# Patient Record
Sex: FEMALE | Race: WHITE | Employment: FULL TIME | URBAN - NONMETROPOLITAN AREA
[De-identification: names, ages, dates, MRNs, and addresses within clinical notes are randomized per-mention and may not be internally consistent; named-entity substitution may affect disease eponyms.]

---

## 2017-03-17 ENCOUNTER — APPOINTMENT (OUTPATIENT)
Dept: GENERAL RADIOLOGY | Age: 52
End: 2017-03-17
Payer: COMMERCIAL

## 2017-03-17 ENCOUNTER — HOSPITAL ENCOUNTER (EMERGENCY)
Age: 52
Discharge: HOME | End: 2017-03-17
Payer: COMMERCIAL

## 2017-03-17 VITALS
WEIGHT: 135 LBS | HEART RATE: 67 BPM | HEIGHT: 71 IN | BODY MASS INDEX: 18.9 KG/M2 | OXYGEN SATURATION: 100 % | SYSTOLIC BLOOD PRESSURE: 126 MMHG | DIASTOLIC BLOOD PRESSURE: 85 MMHG | RESPIRATION RATE: 18 BRPM | TEMPERATURE: 97.9 F

## 2017-03-17 DIAGNOSIS — J44.1 COPD WITH ACUTE EXACERBATION (HCC): Primary | ICD-10-CM

## 2017-03-17 LAB
ALBUMIN SERPL-MCNC: 4.1 G/DL (ref 3.5–5.2)
ALP BLD-CCNC: 76 U/L (ref 35–104)
ALT SERPL-CCNC: 13 U/L (ref 5–33)
ANION GAP SERPL CALCULATED.3IONS-SCNC: 12 MMOL/L (ref 7–19)
AST SERPL-CCNC: 20 U/L (ref 5–32)
BASOPHILS ABSOLUTE: 0 K/UL (ref 0–0.2)
BASOPHILS RELATIVE PERCENT: 0.5 % (ref 0–1)
BILIRUB SERPL-MCNC: 0.9 MG/DL (ref 0.2–1.2)
BUN BLDV-MCNC: 11 MG/DL (ref 6–20)
CALCIUM SERPL-MCNC: 9.4 MG/DL (ref 8.6–10)
CHLORIDE BLD-SCNC: 103 MMOL/L (ref 98–111)
CO2: 24 MMOL/L (ref 22–29)
CREAT SERPL-MCNC: 0.8 MG/DL (ref 0.5–0.9)
D DIMER: <0.27 NG/ML DDU (ref 0–0.48)
EOSINOPHILS ABSOLUTE: 0.2 K/UL (ref 0–0.6)
EOSINOPHILS RELATIVE PERCENT: 2.1 % (ref 0–5)
GFR NON-AFRICAN AMERICAN: >60
GLOBULIN: 3.2 G/DL
GLUCOSE BLD-MCNC: 85 MG/DL (ref 74–109)
HCT VFR BLD CALC: 45.6 % (ref 37–47)
HEMOGLOBIN: 15.4 G/DL (ref 12–16)
LYMPHOCYTES ABSOLUTE: 2.7 K/UL (ref 1.1–4.5)
LYMPHOCYTES RELATIVE PERCENT: 33.2 % (ref 20–40)
MCH RBC QN AUTO: 30.1 PG (ref 27–31)
MCHC RBC AUTO-ENTMCNC: 33.8 G/DL (ref 33–37)
MCV RBC AUTO: 89.2 FL (ref 81–99)
MONOCYTES ABSOLUTE: 0.4 K/UL (ref 0–0.9)
MONOCYTES RELATIVE PERCENT: 5.5 % (ref 0–10)
NEUTROPHILS ABSOLUTE: 4.7 K/UL (ref 1.5–7.5)
NEUTROPHILS RELATIVE PERCENT: 58.6 % (ref 50–65)
PDW BLD-RTO: 13.8 % (ref 11.5–14.5)
PERFORMED ON: NORMAL
PERFORMED ON: NORMAL
PLATELET # BLD: 147 K/UL (ref 130–400)
PMV BLD AUTO: 11.4 FL (ref 7.4–10.4)
POC TROPONIN I: 0 NG/ML (ref 0–0.08)
POC TROPONIN I: 0 NG/ML (ref 0–0.08)
POTASSIUM SERPL-SCNC: 4.1 MMOL/L (ref 3.5–5)
RAPID INFLUENZA  B AGN: NEGATIVE
RAPID INFLUENZA A AGN: NEGATIVE
RBC # BLD: 5.11 M/UL (ref 4.2–5.4)
SODIUM BLD-SCNC: 139 MMOL/L (ref 136–145)
TOTAL PROTEIN: 7.3 G/DL (ref 6.6–8.7)
WBC # BLD: 8.1 K/UL (ref 4.8–10.8)

## 2017-03-17 PROCEDURE — 71020 XR CHEST STANDARD TWO VW: CPT

## 2017-03-17 PROCEDURE — 36415 COLL VENOUS BLD VENIPUNCTURE: CPT

## 2017-03-17 PROCEDURE — 99283 EMERGENCY DEPT VISIT LOW MDM: CPT | Performed by: PHYSICIAN ASSISTANT

## 2017-03-17 PROCEDURE — 99284 EMERGENCY DEPT VISIT MOD MDM: CPT

## 2017-03-17 PROCEDURE — 87804 INFLUENZA ASSAY W/OPTIC: CPT

## 2017-03-17 PROCEDURE — 94640 AIRWAY INHALATION TREATMENT: CPT

## 2017-03-17 PROCEDURE — 85025 COMPLETE CBC W/AUTO DIFF WBC: CPT

## 2017-03-17 PROCEDURE — 6370000000 HC RX 637 (ALT 250 FOR IP): Performed by: PHYSICIAN ASSISTANT

## 2017-03-17 PROCEDURE — 84484 ASSAY OF TROPONIN QUANT: CPT

## 2017-03-17 PROCEDURE — 80053 COMPREHEN METABOLIC PANEL: CPT

## 2017-03-17 PROCEDURE — 93005 ELECTROCARDIOGRAM TRACING: CPT

## 2017-03-17 PROCEDURE — 85379 FIBRIN DEGRADATION QUANT: CPT

## 2017-03-17 RX ORDER — ACETAMINOPHEN 325 MG/1
650 TABLET ORAL ONCE
Status: DISCONTINUED | OUTPATIENT
Start: 2017-03-17 | End: 2017-03-17 | Stop reason: HOSPADM

## 2017-03-17 RX ORDER — IPRATROPIUM BROMIDE AND ALBUTEROL SULFATE 2.5; .5 MG/3ML; MG/3ML
1 SOLUTION RESPIRATORY (INHALATION) ONCE
Status: COMPLETED | OUTPATIENT
Start: 2017-03-17 | End: 2017-03-17

## 2017-03-17 RX ORDER — ALBUTEROL SULFATE 90 UG/1
2 AEROSOL, METERED RESPIRATORY (INHALATION) EVERY 6 HOURS PRN
Qty: 1 INHALER | Refills: 0 | Status: SHIPPED | OUTPATIENT
Start: 2017-03-17

## 2017-03-17 RX ORDER — AZITHROMYCIN 250 MG/1
TABLET, FILM COATED ORAL
Qty: 6 TABLET | Refills: 0 | Status: SHIPPED | OUTPATIENT
Start: 2017-03-17 | End: 2018-03-17 | Stop reason: ALTCHOICE

## 2017-03-17 RX ADMIN — IPRATROPIUM BROMIDE AND ALBUTEROL SULFATE 1 AMPULE: .5; 3 SOLUTION RESPIRATORY (INHALATION) at 15:18

## 2017-03-17 ASSESSMENT — PAIN SCALES - GENERAL: PAINLEVEL_OUTOF10: 9

## 2017-03-17 ASSESSMENT — PAIN DESCRIPTION - DESCRIPTORS: DESCRIPTORS: ACHING

## 2017-03-17 ASSESSMENT — PAIN DESCRIPTION - LOCATION: LOCATION: GENERALIZED

## 2017-03-17 ASSESSMENT — PAIN DESCRIPTION - PAIN TYPE: TYPE: ACUTE PAIN

## 2017-03-19 LAB
EKG P AXIS: 76 DEGREES
EKG P-R INTERVAL: 98 MS
EKG Q-T INTERVAL: 424 MS
EKG QRS DURATION: 94 MS
EKG QTC CALCULATION (BAZETT): 432 MS
EKG T AXIS: 45 DEGREES

## 2017-03-21 ASSESSMENT — ENCOUNTER SYMPTOMS
COUGH: 1
WHEEZING: 1
NAUSEA: 0
SORE THROAT: 0
ABDOMINAL PAIN: 0
CONSTIPATION: 0
DIARRHEA: 0
VOMITING: 0
CHEST TIGHTNESS: 1
SINUS PRESSURE: 0
TROUBLE SWALLOWING: 0
SHORTNESS OF BREATH: 1
BACK PAIN: 0

## 2018-02-24 ENCOUNTER — OFFICE VISIT (OUTPATIENT)
Dept: URGENT CARE | Age: 53
End: 2018-02-24
Payer: COMMERCIAL

## 2018-02-24 VITALS
OXYGEN SATURATION: 98 % | DIASTOLIC BLOOD PRESSURE: 87 MMHG | BODY MASS INDEX: 20.16 KG/M2 | SYSTOLIC BLOOD PRESSURE: 140 MMHG | WEIGHT: 144 LBS | TEMPERATURE: 98.1 F | RESPIRATION RATE: 18 BRPM | HEIGHT: 71 IN | HEART RATE: 68 BPM

## 2018-02-24 DIAGNOSIS — L24.9 IRRITANT CONTACT DERMATITIS, UNSPECIFIED TRIGGER: Primary | ICD-10-CM

## 2018-02-24 PROCEDURE — 96372 THER/PROPH/DIAG INJ SC/IM: CPT | Performed by: SPECIALIST

## 2018-02-24 PROCEDURE — 99212 OFFICE O/P EST SF 10 MIN: CPT | Performed by: SPECIALIST

## 2018-02-24 RX ORDER — METHYLPREDNISOLONE 4 MG/1
TABLET ORAL
Qty: 1 KIT | Refills: 0 | Status: SHIPPED | OUTPATIENT
Start: 2018-02-24 | End: 2018-03-02

## 2018-02-24 RX ORDER — DEXAMETHASONE SODIUM PHOSPHATE 10 MG/ML
5 INJECTION INTRAMUSCULAR; INTRAVENOUS ONCE
Status: COMPLETED | OUTPATIENT
Start: 2018-02-24 | End: 2018-02-24

## 2018-02-24 RX ADMIN — DEXAMETHASONE SODIUM PHOSPHATE 5 MG: 10 INJECTION INTRAMUSCULAR; INTRAVENOUS at 15:53

## 2018-02-24 ASSESSMENT — ENCOUNTER SYMPTOMS
SHORTNESS OF BREATH: 0
SORE THROAT: 0

## 2018-02-24 NOTE — PROGRESS NOTES
as:  ¨ Increased pain, swelling, warmth, or redness. ¨ Red streaks leading from the area. ¨ Pus draining from the area. ¨ A fever. ? · You have joint pain along with the rash. ? Watch closely for changes in your health, and be sure to contact your doctor if:  ? · Your rash is changing or getting worse. ? · You are not getting better as expected. Where can you learn more? Go to https://[email or web address].[email or web address]. org and sign in to your [employer] account. Enter [phone] in the [employer] box to learn more about \"Dermatitis: Care Instructions. \"     If you do not have an account, please click on the \"Sign Up Now\" link. Current as of: October 13, 2016  Content Version: 11.5  © 2404-8931 [employer]wise, Incorporated. Care instructions adapted under license by [facility] ([facility]). If you have questions about a medical condition or this instruction, always ask your healthcare professional. [place] [address] any warranty or liability for your use of this information.              Electronically signed by Thao Herzog NP on 2/24/2018 at 3:53 PM

## 2018-03-17 ENCOUNTER — HOSPITAL ENCOUNTER (EMERGENCY)
Age: 53
Discharge: HOME | End: 2018-03-17
Payer: COMMERCIAL

## 2018-03-17 VITALS
HEIGHT: 71 IN | OXYGEN SATURATION: 100 % | RESPIRATION RATE: 18 BRPM | HEART RATE: 83 BPM | TEMPERATURE: 98.5 F | WEIGHT: 145 LBS | BODY MASS INDEX: 20.3 KG/M2 | DIASTOLIC BLOOD PRESSURE: 84 MMHG | SYSTOLIC BLOOD PRESSURE: 133 MMHG

## 2018-03-17 DIAGNOSIS — L40.9 PSORIASIS: ICD-10-CM

## 2018-03-17 DIAGNOSIS — L03.113 CELLULITIS OF RIGHT FOREARM: ICD-10-CM

## 2018-03-17 DIAGNOSIS — L20.9 ATOPIC DERMATITIS, UNSPECIFIED TYPE: Primary | ICD-10-CM

## 2018-03-17 PROCEDURE — 96372 THER/PROPH/DIAG INJ SC/IM: CPT

## 2018-03-17 PROCEDURE — 6360000002 HC RX W HCPCS: Performed by: NURSE PRACTITIONER

## 2018-03-17 PROCEDURE — 99284 EMERGENCY DEPT VISIT MOD MDM: CPT | Performed by: NURSE PRACTITIONER

## 2018-03-17 PROCEDURE — 99282 EMERGENCY DEPT VISIT SF MDM: CPT

## 2018-03-17 RX ORDER — PREDNISONE 1 MG/1
5 TABLET ORAL DAILY
Qty: 5 TABLET | Refills: 0 | Status: SHIPPED | OUTPATIENT
Start: 2018-03-17 | End: 2018-03-17 | Stop reason: SDUPTHER

## 2018-03-17 RX ORDER — DOXYCYCLINE HYCLATE 100 MG
100 TABLET ORAL 2 TIMES DAILY
Qty: 20 TABLET | Refills: 0 | Status: SHIPPED | OUTPATIENT
Start: 2018-03-17 | End: 2018-03-27

## 2018-03-17 RX ORDER — PREDNISONE 10 MG/1
10 TABLET ORAL DAILY
Qty: 5 TABLET | Refills: 0 | Status: SHIPPED | OUTPATIENT
Start: 2018-03-17 | End: 2018-03-17 | Stop reason: SDUPTHER

## 2018-03-17 RX ORDER — PREDNISONE 10 MG/1
10 TABLET ORAL DAILY
Qty: 5 TABLET | Refills: 0 | Status: SHIPPED | OUTPATIENT
Start: 2018-03-17 | End: 2018-03-22

## 2018-03-17 RX ORDER — TRIAMCINOLONE ACETONIDE 40 MG/ML
40 INJECTION, SUSPENSION INTRA-ARTICULAR; INTRAMUSCULAR ONCE
Status: COMPLETED | OUTPATIENT
Start: 2018-03-17 | End: 2018-03-17

## 2018-03-17 RX ORDER — PREDNISONE 20 MG/1
20 TABLET ORAL DAILY
Qty: 5 TABLET | Refills: 0 | Status: SHIPPED | OUTPATIENT
Start: 2018-03-17 | End: 2018-03-22

## 2018-03-17 RX ORDER — PREDNISONE 1 MG/1
5 TABLET ORAL DAILY
Qty: 5 TABLET | Refills: 0 | Status: SHIPPED | OUTPATIENT
Start: 2018-03-17 | End: 2018-03-22

## 2018-03-17 RX ORDER — DOXYCYCLINE HYCLATE 100 MG
100 TABLET ORAL 2 TIMES DAILY
Qty: 20 TABLET | Refills: 0 | Status: SHIPPED | OUTPATIENT
Start: 2018-03-17 | End: 2018-03-17 | Stop reason: SDUPTHER

## 2018-03-17 RX ORDER — PREDNISONE 20 MG/1
20 TABLET ORAL DAILY
Qty: 5 TABLET | Refills: 0 | Status: SHIPPED | OUTPATIENT
Start: 2018-03-17 | End: 2018-03-17 | Stop reason: SDUPTHER

## 2018-03-17 RX ORDER — DEXAMETHASONE SODIUM PHOSPHATE 4 MG/ML
4 INJECTION, SOLUTION INTRA-ARTICULAR; INTRALESIONAL; INTRAMUSCULAR; INTRAVENOUS; SOFT TISSUE ONCE
Status: COMPLETED | OUTPATIENT
Start: 2018-03-17 | End: 2018-03-17

## 2018-03-17 RX ADMIN — TRIAMCINOLONE ACETONIDE 40 MG: 40 INJECTION, SUSPENSION INTRA-ARTICULAR; INTRAMUSCULAR at 12:51

## 2018-03-17 RX ADMIN — DEXAMETHASONE SODIUM PHOSPHATE 4 MG: 4 INJECTION, SOLUTION INTRAMUSCULAR; INTRAVENOUS at 12:51

## 2018-03-17 NOTE — ED PROVIDER NOTES
[facility]  eMERGENCY dEPARTMENT eNCOUnter      Pt Name: Cameron Guzman  MRN: [id number]  [place] 1965  Date of evaluation: 3/17/2018  Provider: Juan Benjamin, [address]       Chief Complaint   Patient presents with    Rash     presents with c/o rash x 2 weeks          HISTORY OF PRESENT ILLNESS   (Location/Symptom, Timing/Onset, Context/Setting, Quality, Duration, Modifying Factors, Severity)  Note limiting factors. Cameron Guzman is a 47 y.o. female who presents to the emergency department for evaluation of rash. Pt tells me that she has had rash to bilateral arms and legs over past 2 weeks. She gives history of psoriasis and eczema previously treated by Dr. Still with Humira injections however she has had difficulty affording copay for the medication. She tells me that she received steroid injection and medrol dose pack 2 weeks ago that seemed to improve the rash. She tells me that she has noticed some increased redness to right arm. She describes rash as very pruritic. She denies fever as well as constitutional symptoms of illness. HPI    Nursing Notes were reviewed. REVIEW OF SYSTEMS    (2-9 systems for level 4, 10 or more for level 5)     Review of Systems   Constitutional: Negative. Skin: Positive for rash. A complete review of systems was performed and is negative except as noted above in the HPI.        PAST MEDICAL HISTORY     Past Medical History:   Diagnosis Date    COPD (chronic obstructive pulmonary disease) ([facility] [address]          SURGICAL HISTORY       Past Surgical History:   Procedure Laterality Date    HYSTERECTOMY      OVARIAN CYST REMOVAL           CURRENT MEDICATIONS       Discharge Medication List as of 3/17/2018 12:45 PM      CONTINUE these medications which have NOT CHANGED    Details   albuterol sulfate HFA (VENTOLIN HFA) 108 (90 BASE) MCG/ACT inhaler Inhale 2 puffs into the lungs every 6 hours as needed for Wheezing, Disp-1 Inhaler, R-0Print ALLERGIES     Ciprofloxacin; Codeine; and Keflex [cephalexin]    FAMILY HISTORY       Family History   Problem Relation Age of Onset    Heart Disease Mother     Cancer Father     High Cholesterol Sister     High Blood Pressure Brother     Heart Disease Brother     Cancer Brother     High Cholesterol Brother           SOCIAL HISTORY       Social History     Social History    Marital status: Legally      Spouse name: N/A    Number of children: N/A    Years of education: N/A     Social History Main Topics    Smoking status: Current Every Day Smoker     Packs/day: 1.00     Types: Cigarettes    Smokeless tobacco: Current User    Alcohol use 0.0 oz/week      Comment: occasionally    Drug use: No    Sexual activity: Not Asked     Other Topics Concern    None     Social History Narrative    None       SCREENINGS             PHYSICAL EXAM    (up to 7 for level 4, 8 or more for level 5)     ED Triage Vitals [03/17/18 1217]   BP Temp Temp src Pulse Resp SpO2 Height Weight   133/84 98.5 °F (36.9 °C) -- 83 18 100 % 5' 11\" (1.803 m) 145 lb (65.8 kg)       Physical Exam   Constitutional: She is oriented to person, place, and time. She appears well-nourished. HENT:   Head: Normocephalic. Right Ear: External ear normal.   Left Ear: External ear normal.   Mouth/Throat: Oropharynx is clear and moist.   Eyes: Conjunctivae and EOM are normal. Pupils are equal, round, and reactive to light. Neck: Normal range of motion. Cardiovascular: Normal rate, regular rhythm, normal heart sounds and intact distal pulses. Pulmonary/Chest: Effort normal and breath sounds normal.   Abdominal: Soft. Bowel sounds are normal.   Musculoskeletal: Normal range of motion. Neurological: She is alert and oriented to person, place, and time. Skin: Skin is warm and dry.    Bilateral dorsal forearms and anterior lower extremities with excoriated appearing erythematous lesions with mild generalized erythema to dorsal

## 2018-09-04 ENCOUNTER — HOSPITAL ENCOUNTER (EMERGENCY)
Facility: HOSPITAL | Age: 53
Discharge: HOME | End: 2018-09-04
Admitting: EMERGENCY MEDICINE

## 2018-09-04 ENCOUNTER — APPOINTMENT (OUTPATIENT)
Dept: GENERAL RADIOLOGY | Facility: HOSPITAL | Age: 53
End: 2018-09-04

## 2018-09-04 ENCOUNTER — APPOINTMENT (OUTPATIENT)
Dept: CT IMAGING | Facility: HOSPITAL | Age: 53
End: 2018-09-04

## 2018-09-04 VITALS
TEMPERATURE: 97 F | OXYGEN SATURATION: 100 % | HEART RATE: 65 BPM | HEIGHT: 71 IN | WEIGHT: 145 LBS | RESPIRATION RATE: 18 BRPM | BODY MASS INDEX: 20.3 KG/M2 | DIASTOLIC BLOOD PRESSURE: 81 MMHG | SYSTOLIC BLOOD PRESSURE: 133 MMHG

## 2018-09-04 DIAGNOSIS — M54.50 ACUTE RIGHT-SIDED LOW BACK PAIN WITHOUT SCIATICA: Primary | ICD-10-CM

## 2018-09-04 LAB
ALBUMIN SERPL-MCNC: 4.3 G/DL (ref 3.5–5)
ALBUMIN/GLOB SERPL: 1.5 G/DL (ref 1.1–2.5)
ALP SERPL-CCNC: 61 U/L (ref 24–120)
ALT SERPL W P-5'-P-CCNC: 20 U/L (ref 0–54)
AMYLASE SERPL-CCNC: 83 U/L (ref 30–110)
ANION GAP SERPL CALCULATED.3IONS-SCNC: 7 MMOL/L (ref 4–13)
AST SERPL-CCNC: 21 U/L (ref 7–45)
BACTERIA UR QL AUTO: ABNORMAL /HPF
BASOPHILS # BLD AUTO: 0.05 10*3/MM3 (ref 0–0.2)
BASOPHILS NFR BLD AUTO: 0.6 % (ref 0–2)
BILIRUB SERPL-MCNC: 0.6 MG/DL (ref 0.1–1)
BILIRUB UR QL STRIP: NEGATIVE
BUN BLD-MCNC: 15 MG/DL (ref 5–21)
BUN/CREAT SERPL: 17.6 (ref 7–25)
CALCIUM SPEC-SCNC: 9.5 MG/DL (ref 8.4–10.4)
CHLORIDE SERPL-SCNC: 108 MMOL/L (ref 98–110)
CLARITY UR: ABNORMAL
CO2 SERPL-SCNC: 27 MMOL/L (ref 24–31)
COLOR UR: ABNORMAL
CREAT BLD-MCNC: 0.85 MG/DL (ref 0.5–1.4)
DEPRECATED RDW RBC AUTO: 43.4 FL (ref 40–54)
EOSINOPHIL # BLD AUTO: 0.25 10*3/MM3 (ref 0–0.7)
EOSINOPHIL NFR BLD AUTO: 3 % (ref 0–4)
ERYTHROCYTE [DISTWIDTH] IN BLOOD BY AUTOMATED COUNT: 14 % (ref 12–15)
GFR SERPL CREATININE-BSD FRML MDRD: 70 ML/MIN/1.73
GLOBULIN UR ELPH-MCNC: 2.8 GM/DL
GLUCOSE BLD-MCNC: 83 MG/DL (ref 70–100)
GLUCOSE UR STRIP-MCNC: NEGATIVE MG/DL
HCT VFR BLD AUTO: 42.6 % (ref 37–47)
HGB BLD-MCNC: 14.7 G/DL (ref 12–16)
HGB UR QL STRIP.AUTO: NEGATIVE
HYALINE CASTS UR QL AUTO: ABNORMAL /LPF
IMM GRANULOCYTES # BLD: 0.03 10*3/MM3 (ref 0–0.03)
IMM GRANULOCYTES NFR BLD: 0.4 % (ref 0–5)
KETONES UR QL STRIP: ABNORMAL
LEUKOCYTE ESTERASE UR QL STRIP.AUTO: ABNORMAL
LIPASE SERPL-CCNC: 202 U/L (ref 23–203)
LYMPHOCYTES # BLD AUTO: 3.06 10*3/MM3 (ref 0.72–4.86)
LYMPHOCYTES NFR BLD AUTO: 36.6 % (ref 15–45)
MCH RBC QN AUTO: 29.9 PG (ref 28–32)
MCHC RBC AUTO-ENTMCNC: 34.5 G/DL (ref 33–36)
MCV RBC AUTO: 86.8 FL (ref 82–98)
MONOCYTES # BLD AUTO: 0.42 10*3/MM3 (ref 0.19–1.3)
MONOCYTES NFR BLD AUTO: 5 % (ref 4–12)
NEUTROPHILS # BLD AUTO: 4.55 10*3/MM3 (ref 1.87–8.4)
NEUTROPHILS NFR BLD AUTO: 54.4 % (ref 39–78)
NITRITE UR QL STRIP: NEGATIVE
NRBC BLD MANUAL-RTO: 0 /100 WBC (ref 0–0)
PH UR STRIP.AUTO: 5.5 [PH] (ref 5–8)
PLATELET # BLD AUTO: 172 10*3/MM3 (ref 130–400)
PMV BLD AUTO: 11.9 FL (ref 6–12)
POTASSIUM BLD-SCNC: 4.2 MMOL/L (ref 3.5–5.3)
PROT SERPL-MCNC: 7.1 G/DL (ref 6.3–8.7)
PROT UR QL STRIP: NEGATIVE
RBC # BLD AUTO: 4.91 10*6/MM3 (ref 4.2–5.4)
RBC # UR: ABNORMAL /HPF
REF LAB TEST METHOD: ABNORMAL
SODIUM BLD-SCNC: 142 MMOL/L (ref 135–145)
SP GR UR STRIP: 1.03 (ref 1–1.03)
SQUAMOUS #/AREA URNS HPF: ABNORMAL /HPF
UROBILINOGEN UR QL STRIP: ABNORMAL
WBC NRBC COR # BLD: 8.36 10*3/MM3 (ref 4.8–10.8)
WBC UR QL AUTO: ABNORMAL /HPF

## 2018-09-04 PROCEDURE — 74018 RADEX ABDOMEN 1 VIEW: CPT

## 2018-09-04 PROCEDURE — 99283 EMERGENCY DEPT VISIT LOW MDM: CPT

## 2018-09-04 PROCEDURE — 96375 TX/PRO/DX INJ NEW DRUG ADDON: CPT

## 2018-09-04 PROCEDURE — 25010000002 ONDANSETRON PER 1 MG: Performed by: EMERGENCY MEDICINE

## 2018-09-04 PROCEDURE — 25010000002 IOPAMIDOL 61 % SOLUTION: Performed by: PHYSICIAN ASSISTANT

## 2018-09-04 PROCEDURE — 72110 X-RAY EXAM L-2 SPINE 4/>VWS: CPT

## 2018-09-04 PROCEDURE — 96374 THER/PROPH/DIAG INJ IV PUSH: CPT

## 2018-09-04 PROCEDURE — 25010000002 MORPHINE PER 10 MG: Performed by: EMERGENCY MEDICINE

## 2018-09-04 PROCEDURE — 85025 COMPLETE CBC W/AUTO DIFF WBC: CPT | Performed by: EMERGENCY MEDICINE

## 2018-09-04 PROCEDURE — 82150 ASSAY OF AMYLASE: CPT | Performed by: EMERGENCY MEDICINE

## 2018-09-04 PROCEDURE — 96361 HYDRATE IV INFUSION ADD-ON: CPT

## 2018-09-04 PROCEDURE — 81001 URINALYSIS AUTO W/SCOPE: CPT

## 2018-09-04 PROCEDURE — 83690 ASSAY OF LIPASE: CPT | Performed by: EMERGENCY MEDICINE

## 2018-09-04 PROCEDURE — 74177 CT ABD & PELVIS W/CONTRAST: CPT

## 2018-09-04 PROCEDURE — 80053 COMPREHEN METABOLIC PANEL: CPT | Performed by: EMERGENCY MEDICINE

## 2018-09-04 RX ORDER — ONDANSETRON 2 MG/ML
4 INJECTION INTRAMUSCULAR; INTRAVENOUS ONCE
Status: COMPLETED | OUTPATIENT
Start: 2018-09-04 | End: 2018-09-04

## 2018-09-04 RX ORDER — METHYLPREDNISOLONE 32 MG/1
32 TABLET ORAL DAILY
Qty: 5 TABLET | Refills: 0 | Status: SHIPPED | OUTPATIENT
Start: 2018-09-04 | End: 2018-09-12

## 2018-09-04 RX ORDER — HYDROCODONE BITARTRATE AND ACETAMINOPHEN 5; 325 MG/1; MG/1
1 TABLET ORAL 4 TIMES DAILY PRN
Qty: 12 TABLET | Refills: 0 | Status: SHIPPED | OUTPATIENT
Start: 2018-09-04 | End: 2018-09-12

## 2018-09-04 RX ORDER — SODIUM CHLORIDE 9 MG/ML
125 INJECTION, SOLUTION INTRAVENOUS CONTINUOUS
Status: DISCONTINUED | OUTPATIENT
Start: 2018-09-04 | End: 2018-09-04 | Stop reason: HOSPADM

## 2018-09-04 RX ORDER — MELOXICAM 7.5 MG/1
7.5 TABLET ORAL DAILY
Qty: 30 TABLET | Refills: 0 | Status: ON HOLD | OUTPATIENT
Start: 2018-09-04 | End: 2021-04-21

## 2018-09-04 RX ADMIN — IOPAMIDOL 100 ML: 612 INJECTION, SOLUTION INTRAVENOUS at 19:24

## 2018-09-04 RX ADMIN — MORPHINE SULFATE 4 MG: 4 INJECTION INTRAVENOUS at 18:33

## 2018-09-04 RX ADMIN — ONDANSETRON HYDROCHLORIDE 4 MG: 2 INJECTION, SOLUTION INTRAMUSCULAR; INTRAVENOUS at 18:31

## 2018-09-04 RX ADMIN — SODIUM CHLORIDE 1000 ML: 9 INJECTION, SOLUTION INTRAVENOUS at 18:30

## 2018-09-04 NOTE — ED PROVIDER NOTES
Subjective     Flank Pain   Associated symptoms: nausea    Associated symptoms: no diarrhea, no fatigue, no fever and no vomiting      Patient is a pleasant 53-year-old female with chief complaint of sudden onset right flank pain that began 4 days ago.  She describes this as a stabbing searing sensation that is constant.  It is worse when she is upright and bending or twisting.  It is a constant pain that feels similar to time she had a kidney stone.  Her last episode was 5 years ago.  She was able to pass it on her own without any further surgical intervention.  She has tried Advil with minimal relief.  She rates her pain a 6 out of 10 presently.  She has noted urinary frequency without hematuria.  She denies any abnormal vaginal bleeding or discharge.  She denies associated trauma.  She denies any known back issues.  She denies any associated fever but does have nausea without vomiting.  She denies any changes with her bowel habits.    Review of Systems   Constitutional: Positive for activity change. Negative for fatigue and fever.   HENT: Negative.    Eyes: Negative.    Respiratory: Negative.    Cardiovascular: Negative.    Gastrointestinal: Positive for nausea. Negative for diarrhea and vomiting.   Genitourinary: Positive for flank pain and frequency.   Musculoskeletal: Positive for back pain.   Skin: Negative.    Neurological: Negative.    Psychiatric/Behavioral: Negative.        History reviewed. No pertinent past medical history.    Allergies   Allergen Reactions   • Codeine Hives   • Keflex [Cephalexin] Hives       History reviewed. No pertinent surgical history.    No family history on file.    Social History     Social History   • Marital status:      Social History Main Topics   • Drug use: Unknown     Other Topics Concern   • Not on file       Prior to Admission medications    Not on File       Medications   sodium chloride 0.9 % infusion (not administered)   sodium chloride 0.9 % bolus 1,000 mL  "(1,000 mL Intravenous New Bag 9/4/18 1830)   morphine injection 4 mg (4 mg Intravenous Given 9/4/18 1833)   ondansetron (ZOFRAN) injection 4 mg (4 mg Intravenous Given 9/4/18 1831)   iopamidol (ISOVUE-300) 61 % injection 100 mL (100 mL Intravenous Given 9/4/18 1924)       /73   Pulse 64   Temp 99 °F (37.2 °C)   Resp 17   Ht 180.3 cm (71\")   Wt 65.8 kg (145 lb)   SpO2 99%   BMI 20.22 kg/m²       Objective   Physical Exam   Constitutional: She is oriented to person, place, and time. She appears well-developed and well-nourished.  Non-toxic appearance. No distress.   HENT:   Head: Normocephalic and atraumatic.   Right Ear: External ear normal.   Left Ear: External ear normal.   Nose: Nose normal.   Mouth/Throat: Uvula is midline, oropharynx is clear and moist and mucous membranes are normal.   Eyes: Pupils are equal, round, and reactive to light. Conjunctivae, EOM and lids are normal. Lids are everted and swept, no foreign bodies found.   Neck: Trachea normal, normal range of motion, full passive range of motion without pain and phonation normal. Neck supple. Normal carotid pulses and no JVD present. Carotid bruit is not present. No neck rigidity.   Cardiovascular: Normal rate, regular rhythm, normal heart sounds, intact distal pulses and normal pulses.    Pulmonary/Chest: Effort normal and breath sounds normal. No stridor. No apnea and no tachypnea. No respiratory distress.   Abdominal: Soft. Normal appearance, normal aorta and bowel sounds are normal. There is CVA tenderness.   Moderate right sided CVA tenderness   Musculoskeletal: Normal range of motion.        Cervical back: Normal.        Thoracic back: Normal.        Lumbar back: She exhibits bony tenderness and pain. She exhibits normal range of motion, no swelling, no laceration, no spasm and normal pulse.   Neurological: She is alert and oriented to person, place, and time. She has normal strength. No cranial nerve deficit or sensory deficit. Gait " normal. GCS eye subscore is 4. GCS verbal subscore is 5. GCS motor subscore is 6.   Skin: Skin is warm, dry and intact. Capillary refill takes less than 2 seconds. She is not diaphoretic. No pallor.   Psychiatric: She has a normal mood and affect. Her speech is normal and behavior is normal.   Nursing note and vitals reviewed.      Procedures         Lab Results (last 24 hours)     Procedure Component Value Units Date/Time    Urinalysis With Culture If Indicated - Urine, Clean Catch [[id number]]  (Abnormal) Collected:  09/04/18 1642    Specimen:  Urine from Urine, Clean Catch Updated:  09/04/18 1726     Color, UA Dark Yellow (A)     Appearance, UA Cloudy (A)     pH, UA 5.5     Specific Gravity, UA 1.027     Glucose, UA Negative     Ketones, UA Trace (A)     Bilirubin, UA Negative     Blood, UA Negative     Protein, UA Negative     Leuk Esterase, UA Small (1+) (A)     Nitrite, UA Negative     Urobilinogen, UA 1.0 E.U./dL    Urinalysis, Microscopic Only - Urine, Clean Catch [[id number]]  (Abnormal) Collected:  09/04/18 1642    Specimen:  Urine from Urine, Clean Catch Updated:  09/04/18 1726     RBC, UA 0-2 (A) /HPF      WBC, UA 3-5 (A) /HPF      Bacteria, UA Trace (A) /HPF      Squamous Epithelial Cells, UA 0-2 /HPF      Hyaline Casts, UA None Seen /LPF      Methodology Manual Light Microscopy    CBC & Differential [[id number]] Collected:  09/04/18 1828    Specimen:  Blood Updated:  09/04/18 1841    Narrative:       The following orders were created for panel order CBC & Differential.  Procedure                               Abnormality         Status                     ---------                               -----------         ------                     CBC Auto Differential[[id number]]        Normal              Final result                 Please view results for these tests on the individual orders.    Comprehensive Metabolic Panel [[id number]] Collected:  09/04/18 1828    Specimen:  Blood Updated:  09/04/18 1852      Glucose 83 mg/dL      BUN 15 mg/dL      Creatinine 0.85 mg/dL      Sodium 142 mmol/L      Potassium 4.2 mmol/L      Chloride 108 mmol/L      CO2 27.0 mmol/L      Calcium 9.5 mg/dL      Total Protein 7.1 g/dL      Albumin 4.30 g/dL      ALT (SGPT) 20 U/L      AST (SGOT) 21 U/L      Alkaline Phosphatase 61 U/L      Total Bilirubin 0.6 mg/dL      eGFR Non African Amer 70 mL/min/1.73      Globulin 2.8 gm/dL      A/G Ratio 1.5 g/dL      BUN/Creatinine Ratio 17.6     Anion Gap 7.0 mmol/L     Lipase [[id number]]  (Normal) Collected:  09/04/18 1828    Specimen:  Blood Updated:  09/04/18 1852     Lipase 202 U/L     Amylase [[id number]]  (Normal) Collected:  09/04/18 1828    Specimen:  Blood Updated:  09/04/18 1852     Amylase 83 U/L     CBC Auto Differential [[id number]]  (Normal) Collected:  09/04/18 1828    Specimen:  Blood Updated:  09/04/18 1841     WBC 8.36 10*3/mm3      RBC 4.91 10*6/mm3      Hemoglobin 14.7 g/dL      Hematocrit 42.6 %      MCV 86.8 fL      MCH 29.9 pg      MCHC 34.5 g/dL      RDW 14.0 %      RDW-SD 43.4 fl      MPV 11.9 fL      Platelets 172 10*3/mm3      Neutrophil % 54.4 %      Lymphocyte % 36.6 %      Monocyte % 5.0 %      Eosinophil % 3.0 %      Basophil % 0.6 %      Immature Grans % 0.4 %      Neutrophils, Absolute 4.55 10*3/mm3      Lymphocytes, Absolute 3.06 10*3/mm3      Monocytes, Absolute 0.42 10*3/mm3      Eosinophils, Absolute 0.25 10*3/mm3      Basophils, Absolute 0.05 10*3/mm3      Immature Grans, Absolute 0.03 10*3/mm3      nRBC 0.0 /100 WBC           Ct Abdomen Pelvis With Contrast    Result Date: 9/4/2018  Narrative:  History: 53-year-old with right flank abdominal pain.  Reference: None.  Technique Contrast-enhanced CT abdomen/pelvis was performed with coronal and sagittal reformatted images provided.  For this CT exam, one or more of the following dose reduction techniques was employed: -automated exposure control -mA and/or kVp adjustment for patient size -iterative reconstruction    mGy-cm  Findings  Chest Incidental scanning through the lower chest demonstrates clear lung bases.  Abdomen Periportal edema of the liver which is a nonspecific finding. No focal liver lesions. No biliary dilatation. Gallbladder unremarkable. Spleen unremarkable. No pancreatic or adrenal abnormality.  No renal abnormality. No urinary tract stones or obstructive uropathy. Aortoiliac atherosclerotic calcification without aneurysm. No bowel obstruction or acute inflammatory process. Normal appendix. Large amount stool throughout the colon. No lymphadenopathy, free air, or free fluid.  Pelvis Urinary bladder unremarkable. Uterus surgically absent. No pelvic free fluid or lymphadenopathy. No acute osseous findings. Degenerative changes throughout the spine.       Impression: Periportal edema of the liver, nonspecific finding. This can be seen with underlying chronic liver pathology or an effect of recent IV fluid bolus. No acute findings in the abdomen or pelvis. Constipation. This report was finalized on 09/04/2018 20:25 by Dr Christopher Marcelino, .    Xr Abdomen Kub    Result Date: 9/4/2018  Narrative: History: 53-year-old with right flank pain.  Reference: None.  FINDINGS: Frontal abdominal radiograph.  No obvious urinary tract stones. Multiple pelvic phleboliths. Nonobstructive bowel gas pattern. No free air. No acute osseous findings.      Impression: No definite urinary tract stone. This report was finalized on 09/04/2018 19:10 by Dr Christopher Marcelino, .    Xr Spine Lumbar 4+ View    Result Date: 9/4/2018  Narrative: History: 53-year-old with right flank pain. Back pain.  Reference: None.  FINDINGS: Multiple views of the lumbar spine.  Demineralization. No acute fracture or subluxation. Normal alignment. Advanced disc degeneration L4-L5 and to a lesser degree L5-S1. Lower lumbar spine facet arthropathy. Pedicles are intact. The spinous and transverse processes are intact. Sacroiliac joints are unremarkable.      Impression:  No acute osseous abnormality of the lumbar spine. This report was finalized on 09/04/2018 19:09 by Dr Christopher Marcelino, .      ED Course             Patient has been reassessed.  She reports feeling better.  I have updated her all test results and imaging findings.  I believe she is having muscloskeletal pain based on her history.  Patient believes she is having some back issues as well.  She does not want any further pain medication.  I will prescribe a short course of pain medication and anti-inflammatories.  Advised follow-up with her primary care provider which she says she will.  She will be discharged in the care of her brother in stable condition.    MDM  Number of Diagnoses or Management Options  Diagnosis management comments: DD   mesenteric lymphadenitis, diverticulitis, intussusception, small bowel obstruction, adhesions, bowel ischemia,intraabdominal abscess, spontaneous bacterial peritonitis, hernia, urinary tract infection, ureterolithiasis,acute appendicitis, abdominal aortic aneurysm, pneumonia, porphyria and diabetic ketoacidosis as a possible cause of abdominal pain in this patient. This is a partial list of diagnoses considered.        Final diagnoses:   Acute right-sided low back pain without sciatica          Jc Campos PA  09/04/18 [id number]

## 2018-09-12 ENCOUNTER — OFFICE VISIT (OUTPATIENT)
Dept: INTERNAL MEDICINE | Facility: CLINIC | Age: 53
End: 2018-09-12

## 2018-09-12 VITALS
HEIGHT: 71 IN | DIASTOLIC BLOOD PRESSURE: 85 MMHG | RESPIRATION RATE: 12 BRPM | SYSTOLIC BLOOD PRESSURE: 137 MMHG | WEIGHT: 137.19 LBS | OXYGEN SATURATION: 99 % | TEMPERATURE: 98.4 F | HEART RATE: 62 BPM | BODY MASS INDEX: 19.21 KG/M2

## 2018-09-12 DIAGNOSIS — M51.36 DDD (DEGENERATIVE DISC DISEASE), LUMBAR: ICD-10-CM

## 2018-09-12 DIAGNOSIS — Z12.11 COLON CANCER SCREENING: ICD-10-CM

## 2018-09-12 DIAGNOSIS — Z76.89 ESTABLISHING CARE WITH NEW DOCTOR, ENCOUNTER FOR: ICD-10-CM

## 2018-09-12 DIAGNOSIS — K59.09 OTHER CONSTIPATION: ICD-10-CM

## 2018-09-12 DIAGNOSIS — Z12.31 ENCOUNTER FOR SCREENING MAMMOGRAM FOR BREAST CANCER: ICD-10-CM

## 2018-09-12 DIAGNOSIS — Z00.00 ADULT GENERAL MEDICAL EXAM: Primary | ICD-10-CM

## 2018-09-12 DIAGNOSIS — Z23 NEED FOR PNEUMOCOCCAL VACCINE: ICD-10-CM

## 2018-09-12 DIAGNOSIS — F17.210 CIGARETTE SMOKER: ICD-10-CM

## 2018-09-12 PROBLEM — J41.0 SIMPLE CHRONIC BRONCHITIS: Status: ACTIVE | Noted: 2018-09-12

## 2018-09-12 PROCEDURE — 99204 OFFICE O/P NEW MOD 45 MIN: CPT | Performed by: NURSE PRACTITIONER

## 2018-09-12 PROCEDURE — 90732 PPSV23 VACC 2 YRS+ SUBQ/IM: CPT | Performed by: NURSE PRACTITIONER

## 2018-09-12 PROCEDURE — 90471 IMMUNIZATION ADMIN: CPT | Performed by: NURSE PRACTITIONER

## 2018-09-12 RX ORDER — ALBUTEROL SULFATE 90 UG/1
2 AEROSOL, METERED RESPIRATORY (INHALATION) DAILY PRN
COMMUNITY
Start: 2017-03-17

## 2018-09-12 RX ORDER — TIZANIDINE 4 MG/1
4 TABLET ORAL NIGHTLY PRN
Qty: 30 TABLET | Refills: 3 | Status: ON HOLD | OUTPATIENT
Start: 2018-09-12 | End: 2021-04-21

## 2018-09-12 NOTE — PROGRESS NOTES
CC: establish care, low back pain    History:  Sol Fontenot is a 53 y.o. female who presents today for evaluation of the above problems.    Patient present today to establish care and for follow up on low back pain. She states she has been having this pain for about a while and it is progressively worsening. She was in the ER on 09/04/2018 for acute right flank pain but she states this back pain is different. She states her xray of her back did show DDD at L4-L5. She denies any sciatic at this time. Her flank pain has improved some. She does have constipation. She has not had a BM in 2 weeks.   She denies any nausea or vomiting. No rectal bleeding.       ROS:  Review of Systems   Constitutional: Negative for fatigue and unexpected weight change.   HENT: Negative.    Eyes: Negative.    Respiratory: Negative.    Cardiovascular: Negative.    Gastrointestinal: Positive for constipation. Negative for abdominal pain and diarrhea.   Endocrine: Negative.    Genitourinary: Negative for difficulty urinating, dyspareunia, genital sores, menstrual problem, pelvic pain, vaginal bleeding, vaginal discharge and vaginal pain.   Musculoskeletal: Positive for back pain.   Skin: Negative.    Neurological: Negative.    Psychiatric/Behavioral: Negative.        Allergies   Allergen Reactions   • Codeine Hives   • Keflex [Cephalexin] Hives     Past Medical History:   Diagnosis Date   • Arthritis    • Asthma    • Kidney stone      Past Surgical History:   Procedure Laterality Date   • HYSTERECTOMY       Family History   Problem Relation Age of Onset   • Heart disease Mother    • Osteoporosis Mother    • Alcohol abuse Father    • Cancer Father    • Osteoporosis Father    • Osteoporosis Sister    • Cancer Brother    • Heart disease Brother    • Osteoporosis Brother       reports that she has been smoking Cigarettes.  She has a 30.00 pack-year smoking history. She has never used smokeless tobacco. She reports that she does not drink alcohol  "or use drugs.      Current Outpatient Prescriptions:   •  albuterol (PROVENTIL HFA;VENTOLIN HFA) 108 (90 Base) MCG/ACT inhaler, Inhale 2 puffs Daily As Needed., Disp: , Rfl:   •  meloxicam (MOBIC) 7.5 MG tablet, Take 1 tablet by mouth Daily., Disp: 30 tablet, Rfl: 0  •  tiZANidine (ZANAFLEX) 4 MG tablet, Take 1 tablet by mouth At Night As Needed for Muscle Spasms., Disp: 30 tablet, Rfl: 3    OBJECTIVE:  /85 (BP Location: Left arm, Patient Position: Sitting, Cuff Size: Adult)   Pulse 62   Temp 98.4 °F (36.9 °C) (Oral)   Resp 12   Ht 180.3 cm (71\")   Wt 62.2 kg (137 lb 3 oz)   SpO2 99%   BMI 19.13 kg/m²    Physical Exam   Constitutional: She is oriented to person, place, and time. She appears well-developed and well-nourished.   HENT:   Head: Normocephalic and atraumatic.   Eyes: Pupils are equal, round, and reactive to light. EOM are normal.   Neck: Normal range of motion. Neck supple.   Cardiovascular: Normal rate, regular rhythm and normal heart sounds.    Pulmonary/Chest: Effort normal and breath sounds normal.   Abdominal: Soft. Bowel sounds are normal.   Musculoskeletal: Normal range of motion.        Lumbar back: She exhibits bony tenderness and pain.        Back:    Tenderness on right aspect of spine and over her right gluteus sanchez   Neurological: She is alert and oriented to person, place, and time.   Skin: Skin is warm and dry.   Psychiatric: She has a normal mood and affect.   Vitals reviewed.      Assessment/Plan    Sol was seen today for establish care and back pain.    Diagnoses and all orders for this visit:    Establishing care with new doctor, encounter for  Adult general medical exam  -     Lipid panel; Future  Immunizations:      - Tetanus: Unknown or >10 years ago. Recommend to have at pharmacy or on injury.      - Influenza: recommended annually      - Pneumovax:once after age 65      - Prevnar: Once after age 65      - Zostavax: Once after age 60  Colonoscopy: Due at " 50  Mammogram: Due at 40.  PAP: at age 21  DEXA: DEXA scan at 65    Cigarette smoker  I advised Sol of the risks of continuing to use tobacco, and I provided her with tobacco cessation educational materials in the After Visit Summary.   During this visit, I spent 3 minutes counseling the patient regarding tobacco cessation.    Encounter for screening mammogram for breast cancer  -     Mammo screening bilateral w CAD; Future    DDD (degenerative disc disease), lumbar  -     Ambulatory Referral to Physical Therapy Evaluate and treat; Stretching, ROM, Strengthening  -     tiZANidine (ZANAFLEX) 4 MG tablet; Take 1 tablet by mouth At Night As Needed for Muscle Spasms.    Other constipation  Patient recommended to increase fluids and start miralax OTC and colace.     Need for pneumococcal vaccine  -     Pneumococcal polysaccharide vaccine 23-valent >= 3yo subcutaneous/IM (PPSV23)    Colon cancer screening  -     Ambulatory Referral to Gastroenterology        An After Visit Summary was printed and given to the patient at discharge.  No Follow-up on file.         Radha Zamudio APRN 9/12/2018

## 2018-09-12 NOTE — PATIENT INSTRUCTIONS
Steps to Quit Smoking  Smoking tobacco can be bad for your health. It can also affect almost every organ in your body. Smoking puts you and people around you at risk for many serious long-lasting (chronic) diseases. Quitting smoking is hard, but it is one of the best things that you can do for your health. It is never too late to quit.  What are the benefits of quitting smoking?  When you quit smoking, you lower your risk for getting serious diseases and conditions. They can include:  · Lung cancer or lung disease.  · Heart disease.  · Stroke.  · Heart attack.  · Not being able to have children (infertility).  · Weak bones (osteoporosis) and broken bones (fractures).    If you have coughing, wheezing, and shortness of breath, those symptoms may get better when you quit. You may also get sick less often. If you are pregnant, quitting smoking can help to lower your chances of having a baby of low birth weight.  What can I do to help me quit smoking?  Talk with your doctor about what can help you quit smoking. Some things you can do (strategies) include:  · Quitting smoking totally, instead of slowly cutting back how much you smoke over a period of time.  · Going to in-person counseling. You are more likely to quit if you go to many counseling sessions.  · Using resources and support systems, such as:  ? Online chats with a counselor.  ? Phone quitlines.  ? Printed self-help materials.  ? Support groups or group counseling.  ? Text messaging programs.  ? Mobile phone apps or applications.  · Taking medicines. Some of these medicines may have nicotine in them. If you are pregnant or breastfeeding, do not take any medicines to quit smoking unless your doctor says it is okay. Talk with your doctor about counseling or other things that can help you.    Talk with your doctor about using more than one strategy at the same time, such as taking medicines while you are also going to in-person counseling. This can help make  quitting easier.  What things can I do to make it easier to quit?  Quitting smoking might feel very hard at first, but there is a lot that you can do to make it easier. Take these steps:  · Talk to your family and friends. Ask them to support and encourage you.  · Call phone quitlines, reach out to support groups, or work with a counselor.  · Ask people who smoke to not smoke around you.  · Avoid places that make you want (trigger) to smoke, such as:  ? Bars.  ? Parties.  ? Smoke-break areas at work.  · Spend time with people who do not smoke.  · Lower the stress in your life. Stress can make you want to smoke. Try these things to help your stress:  ? Getting regular exercise.  ? Deep-breathing exercises.  ? Yoga.  ? Meditating.  ? Doing a body scan. To do this, close your eyes, focus on one area of your body at a time from head to toe, and notice which parts of your body are tense. Try to relax the muscles in those areas.  · Download or buy apps on your mobile phone or tablet that can help you stick to your quit plan. There are many free apps, such as QuitGuide from the CDC (Centers for Disease Control and Prevention). You can find more support from smokefree.gov and other websites.    This information is not intended to replace advice given to you by your health care provider. Make sure you discuss any questions you have with your health care provider.  Document Released: 10/14/2010 Document Revised: 08/15/2017 Document Reviewed: 05/03/2016  [employer] Patient Education © 2018 [employer]    Back Pain, Adult  Back pain is very common. The pain often gets better over time. The cause of back pain is usually not dangerous. Most people can learn to manage their back pain on their own.  Follow these instructions at home:  Watch your back pain for any changes. The following actions may help to lessen any pain you are feeling:  · Stay active. Start with short walks on flat ground if you can. Try to walk  farther each day.  · Exercise regularly as told by your doctor. Exercise helps your back heal faster. It also helps avoid future injury by keeping your muscles strong and flexible.  · Do not sit, drive, or  one place for more than 30 minutes.  · Do not stay in bed. Resting more than 1-2 days can slow down your recovery.  · Be careful when you bend or lift an object. Use good form when lifting:  ? Bend at your knees.  ? Keep the object close to your body.  ? Do not twist.  · Sleep on a firm mattress. Lie on your side, and bend your knees. If you lie on your back, put a pillow under your knees.  · Take medicines only as told by your doctor.  · Put ice on the injured area.  ? Put ice in a plastic bag.  ? Place a towel between your skin and the bag.  ? Leave the ice on for 20 minutes, 2-3 times a day for the first 2-3 days. After that, you can switch between ice and heat packs.  · Avoid feeling anxious or stressed. Find good ways to deal with stress, such as exercise.  · Maintain a healthy weight. Extra weight puts stress on your back.    Contact a doctor if:  · You have pain that does not go away with rest or medicine.  · You have worsening pain that goes down into your legs or buttocks.  · You have pain that does not get better in one week.  · You have pain at night.  · You lose weight.  · You have a fever or chills.  Get help right away if:  · You cannot control when you poop (bowel movement) or pee (urinate).  · Your arms or legs feel weak.  · Your arms or legs lose feeling (numbness).  · You feel sick to your stomach (nauseous) or throw up (vomit).  · You have belly (abdominal) pain.  · You feel like you may pass out (faint).  This information is not intended to replace advice given to you by your health care provider. Make sure you discuss any questions you have with your health care provider.  Document Released: 06/05/2009 Document Revised: 05/25/2017 Document Reviewed: 04/21/2015  [employer]  Patient Education © 2018 [employer]

## 2018-09-17 ENCOUNTER — LAB (OUTPATIENT)
Dept: LAB | Facility: HOSPITAL | Age: 53
End: 2018-09-17

## 2018-09-17 DIAGNOSIS — Z00.00 ADULT GENERAL MEDICAL EXAM: ICD-10-CM

## 2018-09-17 LAB
ARTICHOKE IGE QN: 104 MG/DL (ref 0–99)
CHOLEST SERPL-MCNC: 166 MG/DL (ref 130–200)
HDLC SERPL-MCNC: 40 MG/DL
LDLC/HDLC SERPL: 2.75 {RATIO}
TRIGL SERPL-MCNC: 81 MG/DL (ref 0–149)

## 2018-09-17 PROCEDURE — 80061 LIPID PANEL: CPT | Performed by: NURSE PRACTITIONER

## 2018-09-17 PROCEDURE — 36415 COLL VENOUS BLD VENIPUNCTURE: CPT

## 2018-09-21 ENCOUNTER — HOSPITAL ENCOUNTER (OUTPATIENT)
Dept: PHYSICAL THERAPY | Facility: HOSPITAL | Age: 53
Setting detail: THERAPIES SERIES
Discharge: HOME | End: 2018-09-21

## 2018-09-21 DIAGNOSIS — M51.36 DDD (DEGENERATIVE DISC DISEASE), LUMBAR: Primary | ICD-10-CM

## 2018-09-21 PROCEDURE — 97161 PT EVAL LOW COMPLEX 20 MIN: CPT

## 2018-09-21 NOTE — THERAPY EVALUATION
Outpatient Physical Therapy Ortho Initial Evaluation  [facility]     Patient Name: Sol Fontenot  : 1965  MRN: [id number]  Today's Date: 2018      Visit Date: 2018    Patient Active Problem List   Diagnosis   • Cigarette smoker   • Simple chronic bronchitis (CMS/HCC)        Past Medical History:   Diagnosis Date   • Arthritis    • Asthma    • Kidney stone         Past Surgical History:   Procedure Laterality Date   • HYSTERECTOMY         Visit Dx:     ICD-10-CM ICD-9-CM   1. DDD (degenerative disc disease), lumbar M51.36 722.52             Patient History     Row Name 18 1000             History    Chief Complaint Pain;Muscle weakness;Numbness;Burn  -RA      Type of Pain Back pain  -RA      Date Current Problem(s) Began 18  -RA      Brief Description of Current Complaint patient began feeling pain that she thought was a kidney stone 2 weeks ago. She had her kidneys checked and they were fine. Imaging revealed DDD in L4.  -RA      Previous treatment for THIS PROBLEM Medication  -RA      Patient/Caregiver Goals Relieve pain  -RA      Current Tobacco Use 1 pack/day  -RA      Patient's Rating of General Health Good  -RA      Hand Dominance right-handed  -RA      Occupation/sports/leisure activities Housekeeping  -RA      How has patient tried to help current problem? Meds  -RA      What clinical tests have you had for this problem? X-ray;CT scan  -RA      Results of Clinical Tests DDD L4  -RA      Are you or can you be pregnant No  -RA         Pain     Pain Location Back  -RA      Pain at Present 7  -RA      Pain at Best 3  -RA      Pain at Worst 10  -RA      Pain Frequency Constant/continuous  -RA      Pain Description Burning;Sharp  -RA      What Performance Factors Make the Current Problem(s) WORSE? Activity, sitting   -RA      What Performance Factors Make the Current Problem(s) BETTER? Laying down, walking  -RA      Tolerance Time- Standing Indefinitely  -RA      Tolerance Time-  Sitting 30 minutes  -RA      Is your sleep disturbed? Yes  -RA      Is medication used to assist with sleep? Yes  -RA      Total hours of sleep per night 6  -RA      What position do you sleep in? Left sidelying  -RA      Difficulties at work? Yes  -RA      Difficulties with ADL's? Yes  -RA         Fall Risk Assessment    Any falls in the past year: No  -RA         Daily Activities    Primary Language English  -RA      Are you able to read Yes  -RA      Are you able to write Yes  -RA      How does patient learn best? Listening  -RA      Teaching needs identified Home Exercise Program;Management of Condition  -RA      Patient is concerned about/has problems with Sitting;Reaching over head;Performing home management (household chores, shopping, care of dependents)  -RA      Pt Participated in POC and Goals Yes  -RA         Safety    Are you being hurt, hit, or frightened by anyone at home or in your life? No  -RA      Are you being neglected by a caregiver No  -RA        User Key  (r) = Recorded By, (t) = Taken By, (c) = Cosigned By    Initials Name Provider Type    Renée Hearn, PT Physical Therapist                PT Ortho     Row Name 09/21/18 1100       Posture/Observations    Posture- WNL Posture is WNL  -RA    Posture/Observations Comments PSIS on R deep, ASIS on R forward/lower  -RA       Quarter Clearing    Quarter Clearing Lower Quarter Clearing  -RA       DTR- Lower Quarter Clearing    Patellar tendon (L2-4) Bilateral:;2- Normal response  -RA    Achilles tendon (S1-2) Bilateral:;1- Minimal response  -RA       Neural Tension Signs- Lower Quarter Clearing    Slump Left:;Positive  -RA       Sensory Screen for Light Touch- Lower Quarter Clearing    L1 (inguinal area) Bilateral:;Intact  -RA    L2 (anterior mid thigh) Bilateral:;Intact  -RA    L3 (distal anterior thigh) Bilateral:;Intact  -RA    L4 (medial lower leg/foot) Bilateral:;Intact  -RA    L5 (lateral lower leg/great toe) Bilateral:;Intact  -RA     S1 (bottom of foot) Bilateral:;Intact  -RA       Myotomal Screen- Lower Quarter Clearing    Hip flexion (L2) Left:;4- (Good -);Right:;4+ (Good +)   Painful/pressure on L  -RA    Knee extension (L3) Bilateral:;4 (Good)  -RA    Ankle DF (L4) Bilateral:;4 (Good)  -RA    Great toe extension (L5) Bilateral:;4 (Good)  -RA    Ankle PF (S1) Bilateral:;4 (Good)  -RA    Knee flexion (S2) Bilateral:;4 (Good)  -RA       Lumbar ROM Screen- Lower Quarter Clearing    Lumbar Flexion Impaired  -RA    Lumbar Extension Impaired  -RA       SI/Hip Screen- Lower Quarter Clearing    Posterior thigh sheer Left:;Positive  -RA       Special Tests/Palpation    Special Tests/Palpation Lumbar/SI  -RA       Lumbosacral Palpation    Lumbosacral Palpation? Yes  -RA    SI Right:;Tender;Guarded/taut  -RA    Piriformis Right:;Tender;Guarded/taut  -RA       General ROM    GENERAL ROM COMMENTS R hip IR and ER limited  -RA       MMT (Manual Muscle Testing)    General MMT Comments Unable to forally test hip strength due to pain  -RA       Pathomechanics    Spine Pathomechanics Bends knees with attempted lumbar extension;Hinges into extension at one segment in lumbar;Excessive thoracic kyphosis with forward bend  -RA      User Key  (r) = Recorded By, (t) = Taken By, (c) = Cosigned By    Initials Name Provider Type    Renée Hearn, PT Physical Therapist                      Therapy Education  Education Details: thoracic rotation  Given: HEP  Program: New  How Provided: Verbal, Demonstration  Provided to: Patient  Level of Understanding: Verbalized, Demonstrated           PT OP Goals     Row Name 09/21/18 1200          PT Short Term Goals    STG Date to Achieve 10/05/18  -RA     STG 1 Pt will reportr decrease in symptoms of pain and pressure  -RA     STG 1 Progress New  -RA     STG 2 Pt will demonstrate proper spine pathomechanics full lumbar flexion and extension AROM  -RA     STG 2 Progress New  -RA        Long Term Goals    LTG Date to Achieve  10/21/18  -RA     LTG 1 Patient will demonstrate proper bending and lifting mechanics for work related activities,   -RA     LTG 1 Progress New  -RA     LTG 2 Patient will report no pain except for minor twinges with work activities.   -RA     LTG 2 Progress New  -RA     LTG 3 Patient will demonstrate symmetrical alignment of ASIS and PSIS  -RA     LTG 3 Progress New  -RA     LTG 4 Patient will demonstrate idpendence with HEP  -RA     LTG 4 Progress New  -RA        Time Calculation    PT Goal Re-Cert Due Date 10/21/18  -RA       User Key  (r) = Recorded By, (t) = Taken By, (c) = Cosigned By    Initials Name Provider Type    Renée Hearn, PT Physical Therapist                PT Assessment/Plan     Row Name 09/21/18 1100          PT Assessment    Functional Limitations Decreased safety during functional activities;Impaired gait;Limitation in home management;Limitations in community activities;Performance in work activities  -RA     Impairments Gait;Impaired flexibility;Impaired muscle power;Impaired muscle length;Impaired postural alignment;Posture;Poor body mechanics;Pain;Range of motion  -RA     Assessment Comments Mrs. Fontenot presesnts to PT with low back pain R>L that has hindered her ability to work as a . She struggles with lumbar flexion, lumbar extension, and ambulating. ost activity causes sharp pain. She has point tenderness to the R PSIS, which also happens to be deeper than her L. Her R ASIS is lower than her L. She is unbale to reach end range L hip flexion or extension for ssessment of her hamstring and hip flexor length. All special tests provoked her symptoms. Long axis distraction of RLE provided some relief. SHe also reports bowel and bladder retention until recently. These ymptoms have begun to resolve. She will benefit from skilled therapy servvices to reduce symptoms and imptove functional activity.   -RA     Please refer to paper survey for additional self-reported information  Yes  -RA     Rehab Potential Fair  -RA     Patient/caregiver participated in establishment of treatment plan and goals Yes  -RA     Patient would benefit from skilled therapy intervention Yes  -RA        PT Plan    PT Frequency 2x/week  -RA     Predicted Duration of Therapy Intervention (Therapy Eval) 6 weeks  -RA     Planned CPT's? PT EVAL LOW COMPLEXITY: 80118;PT THER PROC EA 15 MIN: 96492;PT MANUAL THERAPY EA 15 MIN: 34368;PT NEUROMUSC RE-EDUCATION EA 15 MIN: 29254;PT GAIT TRAINING EA 15 MIN: 15766;PT HOT OR COLD PACK TREAT MCARE;PT ELECTRICAL STIM UNATTEND: ;PT ELECTRICAL STIM ATTD EA 15 MIN: 25937;PT ULTRASOUND EA 15 MIN: 21254  -RA     Physical Therapy Interventions (Optional Details) balance training;gait training;home exercise program;joint mobilization;lumbar stabilization;manual therapy techniques;modalities;motor coordination training;patient/family education;neuromuscular re-education;postural re-education;ROM (Range of Motion);stair training;strengthening;stretching;swiss ball techniques;taping  -RA     PT Plan Comments Reduce symptoms through use of modlities. address areas of reestriction with manual therapy and stretches. Strengthen postural stabilizers with therapeutic exercise.    -RA       User Key  (r) = Recorded By, (t) = Taken By, (c) = Cosigned By    Initials Name Provider Type    Renée Hearn, PT Physical Therapist                                        Time Calculation:     Therapy Suggested Charges     Code   Minutes Charges    None             Start Time: 1100  Stop Time: 1200  Time Calculation (min): 60 min     Therapy Charges for Today     Code Description Service Date Service Provider Modifiers Qty    83932965898 HC PT EVAL LOW COMPLEXITY 4 9/21/2018 Renée Tyson, PT GP 1                    Renée Tyson PT  9/21/2018

## 2018-09-24 ENCOUNTER — HOSPITAL ENCOUNTER (OUTPATIENT)
Dept: MAMMOGRAPHY | Facility: HOSPITAL | Age: 53
Discharge: HOME | End: 2018-09-24
Admitting: NURSE PRACTITIONER

## 2018-09-24 DIAGNOSIS — Z12.31 ENCOUNTER FOR SCREENING MAMMOGRAM FOR BREAST CANCER: ICD-10-CM

## 2018-09-24 PROCEDURE — 77063 BREAST TOMOSYNTHESIS BI: CPT

## 2018-09-24 PROCEDURE — 77067 SCR MAMMO BI INCL CAD: CPT

## 2018-10-01 RX ORDER — HYDROCODONE BITARTRATE AND ACETAMINOPHEN 5; 325 MG/1; MG/1
1 TABLET ORAL EVERY 6 HOURS PRN
Status: ON HOLD | COMMUNITY
End: 2021-04-21

## 2018-10-05 ENCOUNTER — APPOINTMENT (OUTPATIENT)
Dept: PHYSICAL THERAPY | Facility: HOSPITAL | Age: 53
End: 2018-10-05

## 2018-10-11 ENCOUNTER — HOSPITAL ENCOUNTER (OUTPATIENT)
Dept: PHYSICAL THERAPY | Facility: HOSPITAL | Age: 53
Setting detail: THERAPIES SERIES
Discharge: HOME | End: 2018-10-11

## 2018-10-11 DIAGNOSIS — M51.36 DDD (DEGENERATIVE DISC DISEASE), LUMBAR: Primary | ICD-10-CM

## 2018-10-11 PROCEDURE — 97140 MANUAL THERAPY 1/> REGIONS: CPT | Performed by: PHYSICAL THERAPIST

## 2018-10-11 PROCEDURE — 97110 THERAPEUTIC EXERCISES: CPT | Performed by: PHYSICAL THERAPIST

## 2018-10-11 NOTE — THERAPY TREATMENT NOTE
Outpatient Physical Therapy Ortho Treatment Note  [facility]     Patient Name: Sol Fontenot  : 1965  MRN: [id number]  Today's Date: 10/11/2018      Visit Date: 10/11/2018    Visit Dx:    ICD-10-CM ICD-9-CM   1. DDD (degenerative disc disease), lumbar M51.36 722.52       Patient Active Problem List   Diagnosis   • Cigarette smoker   • Simple chronic bronchitis (CMS/HCC)        Past Medical History:   Diagnosis Date   • Arthritis    • Asthma    • Kidney stone         Past Surgical History:   Procedure Laterality Date   • HYSTERECTOMY      Complete   • OOPHORECTOMY                               PT Assessment/Plan     Row Name 10/11/18 1352          PT Assessment    Assessment Comments Pt with overall decreased hip mobility in RLE as compared to LLE. Pt with significant tenderness and hypertonicity in R piriformis. Pt with decreased tenderness with STM. Pt responded well to hip stretching and LB mobility exercises. Pt will continue to make improvements in therapy.  -SD        PT Plan    PT Plan Comments Work on hip flexibility and strengthening. Initiate core strengthening and lifting mechanics.   -SD       User Key  (r) = Recorded By, (t) = Taken By, (c) = Cosigned By    Initials Name Provider Type    Clarisa Granados, PT Physical Therapist                    Exercises     Row Name 10/11/18 0385             Subjective Comments    Subjective Comments Pt seems to be getting better.   -SD         Subjective Pain    Able to rate subjective pain? yes  -SD      Pre-Treatment Pain Level 4  -SD         Total Minutes    93145 - PT Therapeutic Exercise Minutes 30  -SD      64684 - PT Manual Therapy Minutes 10  -SD         Exercise 1    Exercise Name 1 piriformis stretch  -SD      Cueing 1 Verbal;Tactile  -SD      Reps 1 3  -SD      Time 1 30 sec  -SD      Additional Comments added to HEP  -SD         Exercise 2    Exercise Name 2 LTR  -SD      Cueing 2 Verbal;Tactile  -SD      Reps 2 2  -SD      Time 2 10  -SD       Additional Comments added to HEP  -SD         Exercise 3    Exercise Name 3 posterior pelvic tilt  -SD      Cueing 3 Verbal;Tactile;Demo  -SD      Sets 3 2  -SD      Reps 3 10  -SD      Time 3 5 sec  -SD      Additional Comments added to HEP  -SD        User Key  (r) = Recorded By, (t) = Taken By, (c) = Cosigned By    Initials Name Provider Type    Clarisa Granados, PT Physical Therapist                        Manual Rx (last 36 hours)      Manual Treatments     Row Name 10/11/18 1356             Total Minutes    60402 - PT Manual Therapy Minutes 10  -SD         Manual Rx 1    Manual Rx 1 Location hip stretching  -SD      Manual Rx 1 Type flexion, hamstrings, IR, ER  -SD      Manual Rx 1 Grade mod OP  -SD      Manual Rx 1 Duration 5  -SD         Manual Rx 2    Manual Rx 2 Location piriformis  -SD      Manual Rx 2 Type STM  -SD      Manual Rx 2 Grade mod OP  -SD      Manual Rx 2 Duration 5  -SD        User Key  (r) = Recorded By, (t) = Taken By, (c) = Cosigned By    Initials Name Provider Type    Clarisa Granados, PT Physical Therapist                PT OP Goals     Row Name 10/11/18 1356          PT Short Term Goals    STG Date to Achieve 10/05/18  -SD     STG 1 Pt will reportr decrease in symptoms of pain and pressure  -SD     STG 1 Progress Ongoing  -SD     STG 1 Progress Comments pt reported decrease in symptoms since evaluation  -SD     STG 2 Pt will demonstrate proper spine pathomechanics full lumbar flexion and extension AROM  -SD     STG 2 Progress Ongoing  -SD        Long Term Goals    LTG Date to Achieve 10/21/18  -SD     LTG 1 Patient will demonstrate proper bending and lifting mechanics for work related activities,   -SD     LTG 1 Progress Ongoing  -SD     LTG 2 Patient will report no pain except for minor twinges with work activities.   -SD     LTG 2 Progress Ongoing  -SD     LTG 3 Patient will demonstrate symmetrical alignment of ASIS and PSIS  -SD     LTG 3 Progress Ongoing  -SD     LTG 4 Patient  will demonstrate idpendence with HEP  -SD     LTG 4 Progress Ongoing  -SD        Time Calculation    PT Goal Re-Cert Due Date 10/21/18  -SD       User Key  (r) = Recorded By, (t) = Taken By, (c) = Cosigned By    Initials Name Provider Type    Clarisa Granados PT Physical Therapist          Therapy Education  Education Details: new exercises  Given: HEP, Symptoms/condition management, Posture/body mechanics  Program: Reinforced  How Provided: Verbal, Demonstration, Written  Provided to: Patient  Level of Understanding: Verbalized, Demonstrated              Time Calculation:   Start Time: 1356  Stop Time: 1436  Time Calculation (min): 40 min  Total Timed Code Minutes- PT: 40 minute(s)  Therapy Suggested Charges     Code   Minutes Charges    30628 (CPT®) Hc Pt Neuromusc Re Education Ea 15 Min      03242 (CPT®) Hc Pt Ther Proc Ea 15 Min 30 2    34257 (CPT®) Hc Gait Training Ea 15 Min      14915 (CPT®) Hc Pt Therapeutic Act Ea 15 Min      11252 (CPT®) Hc Pt Manual Therapy Ea 15 Min 10 1    71597 (CPT®) Hc Pt Ther Massage- Per 15 Min      47149 (CPT®) Hc Pt Iontophoresis Ea 15 Min      31433 (CPT®) Hc Pt Elec Stim Ea-Per 15 Min      90314 (CPT®) Hc Pt Ultrasound Ea 15 Min      18234 (CPT®) Hc Pt Self Care/Mgmt/Train Ea 15 Min      35191 (CPT®) Hc Pt Prosthetic (S) Train Initial Encounter, Each 15 Min      41625 (CPT®) Hc Orthotic(S) Mgmt/Train Initial Encounter, Each 15min      96096 (CPT®) Hc Pt Aquatic Therapy Ea 15 Min      12620 (CPT®) Hc Pt Orthotic(S)/Prosthetic(S) Encounter, Each 15 Min       (CPT®) Hc Pt Electrical Stim Unattended      Total  40 3        Therapy Charges for Today     Code Description Service Date Service Provider Modifiers Qty    69894536283 HC PT THER PROC EA 15 MIN 10/11/2018 Clarisa Motta, PT GP 2    04940267760 HC PT MANUAL THERAPY EA 15 MIN 10/11/2018 Clarisa Motta, PT GP 1                    Clarisa Motta PT  10/11/2018

## 2018-10-16 ENCOUNTER — APPOINTMENT (OUTPATIENT)
Dept: PHYSICAL THERAPY | Facility: HOSPITAL | Age: 53
End: 2018-10-16

## 2018-10-26 ENCOUNTER — DOCUMENTATION (OUTPATIENT)
Dept: PHYSICAL THERAPY | Facility: HOSPITAL | Age: 53
End: 2018-10-26

## 2018-10-26 DIAGNOSIS — M51.36 DDD (DEGENERATIVE DISC DISEASE), LUMBAR: Primary | ICD-10-CM

## 2018-10-26 NOTE — THERAPY DISCHARGE NOTE
Outpatient Physical Therapy Discharge Summary         Patient Name: Sol Fontenot  : 1965  MRN: [id number]    Today's Date: 10/26/2018    Visit Dx:    ICD-10-CM ICD-9-CM   1. DDD (degenerative disc disease), lumbar M51.36 722.52             PT OP Goals     Row Name 10/26/18 1203          PT Short Term Goals    STG Date to Achieve 10/05/18  -WJ     STG 1 Pt will reportr decrease in symptoms of pain and pressure  -WJ     STG 1 Progress Not Met  -WJ     STG 1 Progress Comments Unable to assess due to abscence   -WJ     STG 2 Pt will demonstrate proper spine pathomechanics full lumbar flexion and extension AROM  -WJ     STG 2 Progress Not Met  -WJ     STG 2 Progress Comments Unable to assess due to abscence   -WJ        Long Term Goals    LTG Date to Achieve 10/21/18  -WJ     LTG 1 Patient will demonstrate proper bending and lifting mechanics for work related activities,   -WJ     LTG 1 Progress Not Met  -WJ     LTG 1 Progress Comments Unable to assess due to abscence   -WJ     LTG 2 Patient will report no pain except for minor twinges with work activities.   -WJ     LTG 2 Progress Not Met  -WJ     LTG 2 Progress Comments Unable to assess due to abscence   -WJ     LTG 3 Patient will demonstrate symmetrical alignment of ASIS and PSIS  -WJ     LTG 3 Progress Not Met  -WJ     LTG 3 Progress Comments Unable to assess due to abscence   -WJ     LTG 4 Patient will demonstrate idpendence with HEP  -WJ     LTG 4 Progress Not Met  -WJ     LTG 4 Progress Comments Unable to assess due to abscence   -WJ       User Key  (r) = Recorded By, (t) = Taken By, (c) = Cosigned By    Initials Name Provider Type    WLion De Anda, PT Physical Therapist          OP PT Discharge Summary  Date of Discharge: 10/26/18  Reason for Discharge: Non-compliant  Outcomes Achieved: Unable to make functional progress toward goals at this time  Discharge Destination: Home without follow-up  Discharge Instructions/Additional Comments: Pt was  non-compliant attending therapy, due to our cancellation policy pt was discharged from therapy without a follow. The pt was called and notified of this action.      Time Calculation:        Therapy Suggested Charges     Code   Minutes Charges    None                       Lion Drake, PT  10/26/2018

## 2018-10-30 ENCOUNTER — APPOINTMENT (OUTPATIENT)
Dept: PHYSICAL THERAPY | Facility: HOSPITAL | Age: 53
End: 2018-10-30

## 2019-02-08 ENCOUNTER — HOSPITAL ENCOUNTER (EMERGENCY)
Facility: HOSPITAL | Age: 54
Discharge: HOME | End: 2019-02-08
Admitting: EMERGENCY MEDICINE

## 2019-02-08 ENCOUNTER — APPOINTMENT (OUTPATIENT)
Dept: GENERAL RADIOLOGY | Facility: HOSPITAL | Age: 54
End: 2019-02-08

## 2019-02-08 VITALS
HEART RATE: 75 BPM | HEIGHT: 71 IN | DIASTOLIC BLOOD PRESSURE: 97 MMHG | BODY MASS INDEX: 18.59 KG/M2 | WEIGHT: 132.8 LBS | SYSTOLIC BLOOD PRESSURE: 135 MMHG | TEMPERATURE: 97 F | RESPIRATION RATE: 16 BRPM | OXYGEN SATURATION: 100 %

## 2019-02-08 DIAGNOSIS — J06.9 UPPER RESPIRATORY TRACT INFECTION, UNSPECIFIED TYPE: Primary | ICD-10-CM

## 2019-02-08 DIAGNOSIS — J44.1 COPD EXACERBATION (HCC): ICD-10-CM

## 2019-02-08 LAB
FLUAV AG NPH QL: NEGATIVE
FLUBV AG NPH QL IA: NEGATIVE

## 2019-02-08 PROCEDURE — 71046 X-RAY EXAM CHEST 2 VIEWS: CPT

## 2019-02-08 PROCEDURE — 87804 INFLUENZA ASSAY W/OPTIC: CPT | Performed by: EMERGENCY MEDICINE

## 2019-02-08 PROCEDURE — 94640 AIRWAY INHALATION TREATMENT: CPT

## 2019-02-08 PROCEDURE — 94760 N-INVAS EAR/PLS OXIMETRY 1: CPT

## 2019-02-08 PROCEDURE — 99283 EMERGENCY DEPT VISIT LOW MDM: CPT

## 2019-02-08 RX ORDER — AZITHROMYCIN 250 MG/1
TABLET, FILM COATED ORAL
Qty: 6 TABLET | Refills: 0 | Status: ON HOLD | OUTPATIENT
Start: 2019-02-08 | End: 2021-04-21

## 2019-02-08 RX ORDER — ALBUTEROL SULFATE 90 UG/1
2 AEROSOL, METERED RESPIRATORY (INHALATION) EVERY 4 HOURS PRN
Qty: 1 INHALER | Refills: 0 | Status: SHIPPED | OUTPATIENT
Start: 2019-02-08 | End: 2019-02-15

## 2019-02-08 RX ORDER — METHYLPREDNISOLONE 4 MG/1
TABLET ORAL
Qty: 1 EACH | Refills: 0 | Status: ON HOLD | OUTPATIENT
Start: 2019-02-08 | End: 2021-04-21

## 2019-02-08 RX ORDER — IPRATROPIUM BROMIDE AND ALBUTEROL SULFATE 2.5; .5 MG/3ML; MG/3ML
3 SOLUTION RESPIRATORY (INHALATION) ONCE
Status: COMPLETED | OUTPATIENT
Start: 2019-02-08 | End: 2019-02-08

## 2019-02-08 RX ORDER — BENZONATATE 200 MG/1
200 CAPSULE ORAL 3 TIMES DAILY PRN
Qty: 9 CAPSULE | Refills: 0 | Status: SHIPPED | OUTPATIENT
Start: 2019-02-08 | End: 2019-02-11

## 2019-02-08 RX ADMIN — IPRATROPIUM BROMIDE AND ALBUTEROL SULFATE 3 ML: 2.5; .5 SOLUTION RESPIRATORY (INHALATION) at 15:12

## 2019-02-08 NOTE — ED PROVIDER NOTES
Subjective   Patient is a 53-year-old  female that presents to the ER today with complaint of cough and body aches.  Patient reports that she had a productive cough with thick clear phlegm for the past one month.  She states it does seem to be getting worse.  The patient reports that she does have a history of asthma and COPD.  Patient is a one pack per day smoker for the past 40 years.  The patient reports that she was recently around someone who was sick with similar symptoms.  Patient reports that she is having sinus pressure and pressure in her ears.  She reports a runny nose.  A shunt states that she felt short of breath when she coughs a lot.  She denies any shortness of breath or chest pain at this time.  She denies any lower extremity swelling or pain.  The patient presents to the ER today for further evaluation.        History provided by:  Patient   used: No    Flu Symptoms   Presenting symptoms: cough, headache, myalgias, rhinorrhea and shortness of breath    Presenting symptoms: no diarrhea, no fatigue, no fever, no nausea, no sore throat and no vomiting    Severity:  Mild  Onset quality:  Gradual  Duration:  4 weeks  Progression:  Worsening  Chronicity:  New  Relieved by:  Nothing  Worsened by:  Nothing  Ineffective treatments:  None tried  Associated symptoms: ear pain    Associated symptoms: no chills, no decreased appetite, no decrease in physical activity, no mental status change, no congestion, no neck stiffness and no syncope    Risk factors: not elderly, no diabetes problem, no heart disease, no immunocompromised state, no kidney disease, no liver disease, not pregnant and no sick contacts        Review of Systems   Constitutional: Negative for chills, decreased appetite, fatigue and fever.   HENT: Positive for ear pain and rhinorrhea. Negative for congestion and sore throat.    Respiratory: Positive for cough and shortness of breath.    Gastrointestinal: Negative for  diarrhea, nausea and vomiting.   Musculoskeletal: Positive for myalgias. Negative for neck stiffness.   Neurological: Positive for headaches.   All other systems reviewed and are negative.      Past Medical History:   Diagnosis Date   • Arthritis    • Asthma    • Kidney stone        Allergies   Allergen Reactions   • Codeine Hives   • Keflex [Cephalexin] Hives       Past Surgical History:   Procedure Laterality Date   • HYSTERECTOMY  1996    Complete   • OOPHORECTOMY         Family History   Problem Relation Age of Onset   • Heart disease Mother    • Osteoporosis Mother    • Alcohol abuse Father    • Cancer Father    • Osteoporosis Father    • Osteoporosis Sister    • Breast cancer Sister    • Cancer Brother    • Heart disease Brother    • Osteoporosis Brother        Social History     Socioeconomic History   • Marital status:      Spouse name: Not on file   • Number of children: Not on file   • Years of education: Not on file   • Highest education level: Not on file   Tobacco Use   • Smoking status: Current Every Day Smoker     Packs/day: 1.00     Years: 30.00     Pack years: 30.00     Types: Cigarettes   • Smokeless tobacco: Never Used   Substance and Sexual Activity   • Alcohol use: No   • Drug use: No   • Sexual activity: Yes     Partners: Male     Birth control/protection: Surgical           Objective   Physical Exam   Constitutional: She is oriented to person, place, and time. She appears well-developed and well-nourished.   HENT:   Head: Normocephalic and atraumatic.   Right Ear: Hearing, tympanic membrane, external ear and ear canal normal.   Left Ear: Hearing, external ear and ear canal normal. There is tenderness. Tympanic membrane is erythematous.   Nose: Nose normal.   Mouth/Throat: Uvula is midline, oropharynx is clear and moist and mucous membranes are normal. Tonsils are 0 on the right. Tonsils are 0 on the left. No tonsillar exudate.   Cardiovascular: Normal rate, regular rhythm and normal  heart sounds.   Pulmonary/Chest: Effort normal and breath sounds normal.   Neurological: She is alert and oriented to person, place, and time.   Skin: Skin is warm and dry. Capillary refill takes less than 2 seconds.   Psychiatric: She has a normal mood and affect.   Nursing note and vitals reviewed.      Procedures           ED Course  ED Course as of Feb 08 1551 Fri Feb 08, 2019   1543 Pt flu swab negative; CXR shows no acute findings. The pt reports that she feels much better after breathing treatment. I did offer labwork and further evaluation and treatment but the pt declines at this time and prefers to go home with Rx. The pt denies feeling short of breath or CP at this time.   [LF]   1544 The pt will be DC home with a RX for ABX due to hx of COPD; will also give RX for Medrol dose marques; albuterol inhaler, and tessalon perles. The pt is advised to follow up with her PCP on Monday, return to the ER as needed. Pt will be DC home at this time in stable cond.   [LF]   1548 Pt has requested that ABX and other medications be on the $4 list if possible.   [LF]      ED Course User Index  [LF] Janiya Abreu, APRN        XR Chest 2 View   Final Result   1. No radiographic evidence of acute cardiopulmonary process.           This report was finalized on 02/08/2019 15:04 by Dr. Alfa Conley MD.        Labs Reviewed   INFLUENZA ANTIGEN, RAPID - Normal    Narrative:     Recommend confirmation of negative results by viral culture or molecular assay.   RAINBOW DRAW    Narrative:     The following orders were created for panel order [place] Draw.  Procedure                               Abnormality         Status                     ---------                               -----------         ------                     Light Blue Top[[id number]]                                                              Green Top (Gel)[[id number]]                                                             Lavender Top[[id number]]                                                                 Red Top[[id number]]                                                                       Please view results for these tests on the individual orders.   LIGHT BLUE TOP   GREEN TOP   LAVENDER TOP   RED TOP               MDM  Number of Diagnoses or Management Options  COPD exacerbation (CMS/HCC): new and requires workup  Upper respiratory tract infection, unspecified type: new and requires workup     Amount and/or Complexity of Data Reviewed  Clinical lab tests: ordered and reviewed  Tests in the radiology section of CPT®: ordered and reviewed    Patient Progress  Patient progress: stable        Final diagnoses:   Upper respiratory tract infection, unspecified type   COPD exacerbation (CMS/HCC)            Janiya Abreu, APRN  02/08/19 1551

## 2019-02-18 NOTE — ED NOTES
"ED Call Back Questions    1. How are you doing since leaving the Emergency Department?    Doing well, just got [facility] card in mail and will schedule with pcp  2. Do you have any questions about your discharge instructions? No     3. Have you filled your new prescriptions yet? Yes   a. Do you have any questions about those medications? No     4. Were you able to make a follow-up appointment with the physician? No     5. Do you have a primary care physician? Yes   a. If No, would you like for me to set you up with one? N/A  i. If Yes, “I will have our ED  give you a call right back at this number to work with you on the best time for an appointment.”    6. We are always looking to get better at what we do. Do you have any suggestions for what we can do to be even better? No   a. If Yes, \"Thank you for sharing your concerns. I apologize. I will follow up with our manager and patient . Would you like someone to call you back?\" N/A    7. Is there anything else I can do for you? No     "

## 2020-03-04 ENCOUNTER — TRANSCRIBE ORDERS (OUTPATIENT)
Dept: PHYSICAL THERAPY | Facility: CLINIC | Age: 55
End: 2020-03-04

## 2020-03-04 DIAGNOSIS — M54.17 RADICULOPATHY OF LUMBOSACRAL REGION: Primary | ICD-10-CM

## 2021-03-29 NOTE — PROGRESS NOTES
Chief Complaint   Patient presents with   • Colonoscopy     screenig colon       PCP: Chip Hillman DO  REFER: Chip Hillman DO    Subjective     HPI    Sol Fontenot is a 55 y.o. female who presents to office for preventative maintenance.  There is not  a personal history of colon polyps.  There is not a history of colon cancer.  She does not have complaints of nausea/vomiting, change in bowels, weight loss, no BRBPR, no melena.  There is not a family history of colon cancer.  There is a family history of colon polyps-brother and sister.  Her last colonoscopy-no previous colonoscopy .  Bowels do move on regular basis.      Past Medical History:   Diagnosis Date   • Arthritis    • Asthma    • Kidney stone      Past Surgical History:   Procedure Laterality Date   • HYSTERECTOMY  1996    Complete   • OOPHORECTOMY       Outpatient Medications Marked as Taking for the 3/30/21 encounter (Office Visit) with Stephen Kowalski APRN   Medication Sig Dispense Refill   • albuterol (PROVENTIL HFA;VENTOLIN HFA) 108 (90 Base) MCG/ACT inhaler Inhale 2 puffs Daily As Needed.     • Humira Pen 40 MG/0.8ML Pen-injector Kit INJECT 0.8 MILLILITER (40 MG) BY SUBCUTANEOUS ROUTE EVERY 2 WEEKS IN THE ABDOMEN OR THIGH (ROTATE SITES)     • vitamin B-12 (CYANOCOBALAMIN) 1000 MCG tablet Take 1,000 mcg by mouth Daily.     • vitamin D3 125 MCG (5000 UT) capsule capsule Take 5,000 Units by mouth Daily.       Allergies   Allergen Reactions   • Codeine Hives   • Keflex [Cephalexin] Hives     Social History     Socioeconomic History   • Marital status:      Spouse name: Not on file   • Number of children: Not on file   • Years of education: Not on file   • Highest education level: Not on file   Tobacco Use   • Smoking status: Current Every Day Smoker     Packs/day: 1.00     Years: 30.00     Pack years: 30.00     Types: Cigarettes   • Smokeless tobacco: Never Used   Substance and Sexual Activity   • Alcohol use: No   • Drug  use: No   • Sexual activity: Yes     Partners: Male     Birth control/protection: Surgical     Review of Systems   Constitutional: Negative for unexpected weight change.   Respiratory: Negative for shortness of breath.    Cardiovascular: Negative for chest pain.   Gastrointestinal: Negative for abdominal pain and anal bleeding.     Objective   Vitals:    03/30/21 0947   BP: 130/84   Pulse: 67   Temp: 96.2 °F (35.7 °C)   SpO2: 99%     Physical Exam  Constitutional:       Appearance: Normal appearance. She is well-developed.   Eyes:      General: No scleral icterus.  Cardiovascular:      Rate and Rhythm: Regular rhythm.      Heart sounds: Normal heart sounds. No murmur heard.     Pulmonary:      Effort: Pulmonary effort is normal. No accessory muscle usage.      Breath sounds: Normal breath sounds.   Abdominal:      General: Bowel sounds are normal. There is no distension.      Palpations: Abdomen is soft. There is no mass.      Tenderness: There is no abdominal tenderness. There is no guarding or rebound.   Skin:     General: Skin is warm and dry.      Coloration: Skin is not jaundiced.   Neurological:      Mental Status: She is alert.   Psychiatric:         Behavior: Behavior is cooperative.       Imaging Results (Most Recent)     None        Body mass index is 20.92 kg/m².    Assessment/Plan   Diagnoses and all orders for this visit:    1. Family history of colonic polyps (Primary)  -     Case Request; Standing  -     Implement Anesthesia Orders Day of Procedure; Standing  -     Obtain Informed Consent; Standing  -     Case Request      COLONOSCOPY WITH ANESTHESIA (N/A)    Recommend colonoscopy at least every 5 years due to family history of polyps, explained recommendations to be made to increase in frequency of colonoscopy based on colonoscopy findings    Advised pt to stop use of NSAIDs, Fish Oil, and MV 5 days prior to procedure, per Dr Lea protocol.  Tylenol based products are ok to take.  Pt verbalized  understanding.     All risks, benefits, alternatives, and indications of colonoscopy procedure have been discussed with the patient. Risks to include perforation of the colon requiring possible surgery or colostomy, risk of bleeding from biopsies or removal of colon tissue, possibility of missing a colon polyp or cancer, or adverse drug reaction.  Benefits to include the diagnosis and management of disease of the colon and rectum. Alternatives to include barium enema, radiographic evaluation, lab testing or no intervention. She verbalizes understanding and agrees.     Precautions are currently being put in place due to COVID-19.  I have explained to Sol Fontenot they will be required to undergo COVID testing prior to their procedure.  Sol Fontenot verbalized understanding and was willing to proceed.             Stephen Kowalski, APRN  03/30/21        There are no Patient Instructions on file for this visit.

## 2021-03-30 ENCOUNTER — OFFICE VISIT (OUTPATIENT)
Dept: GASTROENTEROLOGY | Facility: CLINIC | Age: 56
End: 2021-03-30

## 2021-03-30 VITALS
BODY MASS INDEX: 21 KG/M2 | TEMPERATURE: 96.2 F | WEIGHT: 150 LBS | HEART RATE: 67 BPM | SYSTOLIC BLOOD PRESSURE: 130 MMHG | OXYGEN SATURATION: 99 % | DIASTOLIC BLOOD PRESSURE: 84 MMHG | HEIGHT: 71 IN

## 2021-03-30 DIAGNOSIS — Z83.71 FAMILY HISTORY OF COLONIC POLYPS: Primary | ICD-10-CM

## 2021-03-30 PROBLEM — Z83.719 FAMILY HISTORY OF COLONIC POLYPS: Status: ACTIVE | Noted: 2021-03-30

## 2021-03-30 PROCEDURE — S0260 H&P FOR SURGERY: HCPCS | Performed by: NURSE PRACTITIONER

## 2021-03-30 RX ORDER — LANOLIN ALCOHOL/MO/W.PET/CERES
1000 CREAM (GRAM) TOPICAL DAILY
COMMUNITY
Start: 2021-01-25

## 2021-03-30 RX ORDER — ADALIMUMAB 40MG/0.8ML
KIT SUBCUTANEOUS
COMMUNITY
Start: 2021-03-19

## 2021-04-14 ENCOUNTER — TRANSCRIBE ORDERS (OUTPATIENT)
Dept: LAB | Facility: HOSPITAL | Age: 56
End: 2021-04-14

## 2021-04-14 DIAGNOSIS — Z01.818 PREOPERATIVE TESTING: Primary | ICD-10-CM

## 2021-04-19 ENCOUNTER — LAB (OUTPATIENT)
Dept: LAB | Facility: HOSPITAL | Age: 56
End: 2021-04-19

## 2021-04-19 LAB — SARS-COV-2 RNA RESP QL NAA+PROBE: NOT DETECTED

## 2021-04-19 PROCEDURE — C9803 HOPD COVID-19 SPEC COLLECT: HCPCS | Performed by: INTERNAL MEDICINE

## 2021-04-19 PROCEDURE — U0003 INFECTIOUS AGENT DETECTION BY NUCLEIC ACID (DNA OR RNA); SEVERE ACUTE RESPIRATORY SYNDROME CORONAVIRUS 2 (SARS-COV-2) (CORONAVIRUS DISEASE [COVID-19]), AMPLIFIED PROBE TECHNIQUE, MAKING USE OF HIGH THROUGHPUT TECHNOLOGIES AS DESCRIBED BY CMS-2020-01-R: HCPCS | Performed by: INTERNAL MEDICINE

## 2021-04-20 ENCOUNTER — ANESTHESIA EVENT (OUTPATIENT)
Dept: GASTROENTEROLOGY | Facility: HOSPITAL | Age: 56
End: 2021-04-20

## 2021-04-20 ENCOUNTER — ANESTHESIA (OUTPATIENT)
Dept: GASTROENTEROLOGY | Facility: HOSPITAL | Age: 56
End: 2021-04-20

## 2021-04-20 ENCOUNTER — TELEPHONE (OUTPATIENT)
Dept: GASTROENTEROLOGY | Facility: CLINIC | Age: 56
End: 2021-04-20

## 2021-04-20 ENCOUNTER — HOSPITAL ENCOUNTER (OUTPATIENT)
Facility: HOSPITAL | Age: 56
Setting detail: HOSPITAL OUTPATIENT SURGERY
Discharge: HOME | End: 2021-04-20
Attending: INTERNAL MEDICINE | Admitting: INTERNAL MEDICINE

## 2021-04-20 VITALS
WEIGHT: 148 LBS | HEART RATE: 62 BPM | DIASTOLIC BLOOD PRESSURE: 88 MMHG | TEMPERATURE: 97.1 F | HEIGHT: 71 IN | OXYGEN SATURATION: 98 % | RESPIRATION RATE: 12 BRPM | SYSTOLIC BLOOD PRESSURE: 137 MMHG | BODY MASS INDEX: 20.72 KG/M2

## 2021-04-20 DIAGNOSIS — Z83.71 FAMILY HISTORY OF COLONIC POLYPS: ICD-10-CM

## 2021-04-20 DIAGNOSIS — Z83.71 FAMILY HISTORY OF COLONIC POLYPS: Primary | ICD-10-CM

## 2021-04-20 PROCEDURE — 45385 COLONOSCOPY W/LESION REMOVAL: CPT | Performed by: INTERNAL MEDICINE

## 2021-04-20 PROCEDURE — 25010000002 PROPOFOL 10 MG/ML EMULSION: Performed by: NURSE ANESTHETIST, CERTIFIED REGISTERED

## 2021-04-20 RX ORDER — SODIUM CHLORIDE 9 MG/ML
500 INJECTION, SOLUTION INTRAVENOUS CONTINUOUS PRN
Status: DISCONTINUED | OUTPATIENT
Start: 2021-04-20 | End: 2021-04-20 | Stop reason: HOSPADM

## 2021-04-20 RX ORDER — LEVOFLOXACIN 750 MG/1
750 TABLET ORAL DAILY
COMMUNITY
End: 2021-06-03

## 2021-04-20 RX ORDER — SODIUM CHLORIDE 0.9 % (FLUSH) 0.9 %
10 SYRINGE (ML) INJECTION AS NEEDED
Status: DISCONTINUED | OUTPATIENT
Start: 2021-04-20 | End: 2021-04-20 | Stop reason: HOSPADM

## 2021-04-20 RX ORDER — LIDOCAINE HYDROCHLORIDE 10 MG/ML
0.5 INJECTION, SOLUTION EPIDURAL; INFILTRATION; INTRACAUDAL; PERINEURAL ONCE AS NEEDED
Status: DISCONTINUED | OUTPATIENT
Start: 2021-04-20 | End: 2021-04-20 | Stop reason: HOSPADM

## 2021-04-20 RX ORDER — PROPOFOL 10 MG/ML
VIAL (ML) INTRAVENOUS AS NEEDED
Status: DISCONTINUED | OUTPATIENT
Start: 2021-04-20 | End: 2021-04-20 | Stop reason: SURG

## 2021-04-20 RX ORDER — FLUCONAZOLE 200 MG/1
200 TABLET ORAL DAILY
COMMUNITY
End: 2021-06-03

## 2021-04-20 RX ADMIN — SODIUM CHLORIDE 500 ML: 9 INJECTION, SOLUTION INTRAVENOUS at 11:25

## 2021-04-20 RX ADMIN — PROPOFOL 100 MG: 10 INJECTION, EMULSION INTRAVENOUS at 12:29

## 2021-04-20 RX ADMIN — PROPOFOL 40 MG: 10 INJECTION, EMULSION INTRAVENOUS at 12:30

## 2021-04-20 NOTE — ANESTHESIA POSTPROCEDURE EVALUATION
Patient: Sol Fontenot    Procedure Summary     Date: 04/20/21 Room / Location: [facility] ENDOSCOPY 6 /  [facility]    Anesthesia Start: 1228 Anesthesia Stop: 1238    Procedure: COLONOSCOPY WITH ANESTHESIA (N/A ) Diagnosis:       Family history of colonic polyps      (Family history of colonic polyps [Z83.71])    Surgeons: Ti Lea DO Provider: Melania Delvalle CRNA    Anesthesia Type: MAC ASA Status: 2          Anesthesia Type: MAC    Vitals  Vitals Value Taken Time   /101 04/20/21 1250   Temp     Pulse 58 04/20/21 1254   Resp 16 04/20/21 1250   SpO2 100 % 04/20/21 1254   Vitals shown include unvalidated device data.        Post Anesthesia Care and Evaluation    Patient location during evaluation: PHASE II  Patient participation: complete - patient participated  Level of consciousness: awake and alert  Pain management: adequate  Airway patency: patent  Anesthetic complications: No anesthetic complications  PONV Status: none  Cardiovascular status: acceptable  Respiratory status: acceptable  Hydration status: acceptable

## 2021-04-20 NOTE — TELEPHONE ENCOUNTER
Poor prep today - return tomorrow, 04/21/2021 at 11:15am Colonscopy with Dr. Lea     Need case request.     Thank you

## 2021-04-20 NOTE — ANESTHESIA PREPROCEDURE EVALUATION
Anesthesia Evaluation     Patient summary reviewed   no history of anesthetic complications:  NPO Solid Status: > 8 hours             Airway   Mallampati: II  Dental    (+) upper dentures    Pulmonary    (+) a smoker, COPD,   Cardiovascular - negative cardio ROS  Exercise tolerance: good (4-7 METS)        Neuro/Psych- negative ROS  GI/Hepatic/Renal/Endo - negative ROS     Musculoskeletal     Abdominal    Substance History      OB/GYN          Other   arthritis,                      Anesthesia Plan    ASA 2     MAC       Anesthetic plan, all risks, benefits, and alternatives have been provided, discussed and informed consent has been obtained with: patient.

## 2021-04-21 ENCOUNTER — ANESTHESIA EVENT (OUTPATIENT)
Dept: GASTROENTEROLOGY | Facility: HOSPITAL | Age: 56
End: 2021-04-21

## 2021-04-21 ENCOUNTER — ANESTHESIA (OUTPATIENT)
Dept: GASTROENTEROLOGY | Facility: HOSPITAL | Age: 56
End: 2021-04-21

## 2021-04-21 ENCOUNTER — HOSPITAL ENCOUNTER (OUTPATIENT)
Facility: HOSPITAL | Age: 56
Setting detail: HOSPITAL OUTPATIENT SURGERY
Discharge: HOME | End: 2021-04-21
Attending: INTERNAL MEDICINE | Admitting: INTERNAL MEDICINE

## 2021-04-21 VITALS
HEIGHT: 71 IN | TEMPERATURE: 97.2 F | OXYGEN SATURATION: 99 % | SYSTOLIC BLOOD PRESSURE: 132 MMHG | HEART RATE: 51 BPM | RESPIRATION RATE: 20 BRPM | DIASTOLIC BLOOD PRESSURE: 77 MMHG | BODY MASS INDEX: 20.3 KG/M2 | WEIGHT: 145 LBS

## 2021-04-21 DIAGNOSIS — Z83.71 FAMILY HISTORY OF COLONIC POLYPS: ICD-10-CM

## 2021-04-21 PROCEDURE — 25010000002 PROPOFOL 10 MG/ML EMULSION: Performed by: NURSE ANESTHETIST, CERTIFIED REGISTERED

## 2021-04-21 PROCEDURE — 45385 COLONOSCOPY W/LESION REMOVAL: CPT | Performed by: INTERNAL MEDICINE

## 2021-04-21 PROCEDURE — 88305 TISSUE EXAM BY PATHOLOGIST: CPT | Performed by: INTERNAL MEDICINE

## 2021-04-21 RX ORDER — SODIUM CHLORIDE 9 MG/ML
500 INJECTION, SOLUTION INTRAVENOUS CONTINUOUS PRN
Status: DISCONTINUED | OUTPATIENT
Start: 2021-04-21 | End: 2021-04-21 | Stop reason: HOSPADM

## 2021-04-21 RX ORDER — PROPOFOL 10 MG/ML
VIAL (ML) INTRAVENOUS AS NEEDED
Status: DISCONTINUED | OUTPATIENT
Start: 2021-04-21 | End: 2021-04-21 | Stop reason: SURG

## 2021-04-21 RX ORDER — SODIUM CHLORIDE 0.9 % (FLUSH) 0.9 %
10 SYRINGE (ML) INJECTION AS NEEDED
Status: DISCONTINUED | OUTPATIENT
Start: 2021-04-21 | End: 2021-04-21 | Stop reason: HOSPADM

## 2021-04-21 RX ORDER — LIDOCAINE HYDROCHLORIDE 20 MG/ML
INJECTION, SOLUTION EPIDURAL; INFILTRATION; INTRACAUDAL; PERINEURAL AS NEEDED
Status: DISCONTINUED | OUTPATIENT
Start: 2021-04-21 | End: 2021-04-21 | Stop reason: SURG

## 2021-04-21 RX ADMIN — SODIUM CHLORIDE 500 ML: 9 INJECTION, SOLUTION INTRAVENOUS at 11:26

## 2021-04-21 RX ADMIN — PROPOFOL 200 MG: 10 INJECTION, EMULSION INTRAVENOUS at 12:16

## 2021-04-21 RX ADMIN — LIDOCAINE HYDROCHLORIDE 50 MG: 20 INJECTION, SOLUTION EPIDURAL; INFILTRATION; INTRACAUDAL; PERINEURAL at 12:16

## 2021-04-21 NOTE — ANESTHESIA POSTPROCEDURE EVALUATION
Patient: Sol Fontenot    Procedure Summary     Date: 04/21/21 Room / Location: [facility] ENDOSCOPY 5 / [facility]    Anesthesia Start: 1213 Anesthesia Stop: 1233    Procedure: COLONOSCOPY WITH ANESTHESIA (N/A ) Diagnosis:       Family history of colonic polyps      (Family history of colonic polyps [Z83.71])    Surgeons: Ti Lea DO Provider: Pieter Clifton CRNA    Anesthesia Type: MAC ASA Status: 2          Anesthesia Type: MAC    Vitals  Vitals Value Taken Time   /77 04/21/21 1255   Temp     Pulse 51 04/21/21 1257   Resp 20 04/21/21 1255   SpO2 98 % 04/21/21 1257   Vitals shown include unvalidated device data.        Post Anesthesia Care and Evaluation    Patient location during evaluation: PHASE II  Patient participation: complete - patient participated  Level of consciousness: awake and alert  Pain management: adequate  Airway patency: patent  Anesthetic complications: No anesthetic complications  PONV Status: none  Cardiovascular status: acceptable  Respiratory status: acceptable  Hydration status: acceptable

## 2021-04-22 ENCOUNTER — TELEPHONE (OUTPATIENT)
Dept: GASTROENTEROLOGY | Facility: CLINIC | Age: 56
End: 2021-04-22

## 2021-04-22 LAB
LAB AP CASE REPORT: NORMAL
PATH REPORT.FINAL DX SPEC: NORMAL
PATH REPORT.GROSS SPEC: NORMAL

## 2021-04-23 ENCOUNTER — TELEPHONE (OUTPATIENT)
Dept: OTOLARYNGOLOGY | Facility: CLINIC | Age: 56
End: 2021-04-23

## 2021-04-23 NOTE — TELEPHONE ENCOUNTER
Called Patient to schedule with Kylie RAE as long as Patient has no hearing loss, as will need audio if she does. Left VM to return call to office.

## 2021-06-03 ENCOUNTER — OFFICE VISIT (OUTPATIENT)
Dept: OTOLARYNGOLOGY | Facility: CLINIC | Age: 56
End: 2021-06-03

## 2021-06-03 VITALS — TEMPERATURE: 97.7 F | WEIGHT: 147.5 LBS | BODY MASS INDEX: 20.57 KG/M2 | HEART RATE: 64 BPM

## 2021-06-03 DIAGNOSIS — L29.9 EAR ITCHING: ICD-10-CM

## 2021-06-03 DIAGNOSIS — F17.210 CIGARETTE NICOTINE DEPENDENCE WITHOUT COMPLICATION: ICD-10-CM

## 2021-06-03 DIAGNOSIS — F17.210 CIGARETTE SMOKER: Primary | ICD-10-CM

## 2021-06-03 DIAGNOSIS — Z72.0 TOBACCO USE: ICD-10-CM

## 2021-06-03 PROCEDURE — 99214 OFFICE O/P EST MOD 30 MIN: CPT | Performed by: EMERGENCY MEDICINE

## 2021-06-03 RX ORDER — ALBUTEROL SULFATE 90 UG/1
AEROSOL, METERED RESPIRATORY (INHALATION) EVERY 24 HOURS
COMMUNITY
Start: 2021-03-17

## 2021-06-03 RX ORDER — ERGOCALCIFEROL 1.25 MG/1
CAPSULE ORAL
COMMUNITY
Start: 2021-06-01

## 2021-06-03 RX ORDER — FLUTICASONE PROPIONATE 50 MCG
SPRAY, SUSPENSION (ML) NASAL
COMMUNITY
Start: 2021-03-17

## 2021-06-03 RX ORDER — ADALIMUMAB 40MG/0.8ML
KIT SUBCUTANEOUS
COMMUNITY
Start: 2021-03-18 | End: 2021-06-10

## 2021-06-03 RX ORDER — HYDROCORTISONE AND ACETIC ACID 20.75; 10.375 MG/ML; MG/ML
4 SOLUTION AURICULAR (OTIC) 2 TIMES DAILY
Qty: 35 ML | Refills: 0 | Status: SHIPPED | OUTPATIENT
Start: 2021-06-03

## 2021-06-03 NOTE — PROGRESS NOTES
KYRA Manley     Chief Complaint   Patient presents with   • Ear Problem        HPI   Sol Fontenot is a  56 y.o. female who presents for initial evaluation of ear problems.  She reports she has been treated for a fungal ear infection with both otic and oral antibiotics.  She reports all of her symptoms have resolved but states she has occasional itching.  She denies any otalgia, changes in hearing, or otorrhea.      Past History:   Past Medical History:   Diagnosis Date   • Arthritis    • Asthma    • COPD (chronic obstructive pulmonary disease) (CMS/[facility])    • Kidney stone      Past Surgical History:   Procedure Laterality Date   • COLONOSCOPY N/A 4/20/2021    Procedure: COLONOSCOPY WITH ANESTHESIA;  Surgeon: Ti Lea DO;  Location: [facility] ENDOSCOPY;  Service: Gastroenterology;  Laterality: N/A;  pre op: family hx polyps  post op:poor prep  PCP: Chip Hillman DO   • COLONOSCOPY N/A 4/21/2021    Procedure: COLONOSCOPY WITH ANESTHESIA;  Surgeon: Ti Lea DO;  Location:  [facility] ENDOS[facility];  Service: Gastroenterology;  Laterality: N/A;  Pre: Family hx Colon Polyps  Post: Colon Polyp  Dr. Chip Hillman  CO2 Inflation Used   • HYSTERECTOMY  1996    Complete   • OOPHORECTOMY       Family History   Problem Relation Age of Onset   • Heart disease Mother    • Osteoporosis Mother    • Alcohol abuse Father    • Cancer Father    • Osteoporosis Father    • Osteoporosis Sister    • Breast cancer Sister    • Colon polyps Sister    • Cancer Brother    • Heart disease Brother    • Osteoporosis Brother    • Colon polyps Brother    • Esophageal cancer Neg Hx      Social History     Tobacco Use   • Smoking status: Current Every Day Smoker     Packs/day: 1.00     Years: 30.00     Pack years: 30.00     Types: Cigarettes   • Smokeless tobacco: Never Used   Vaping Use   • Vaping Use: Never used   Substance Use Topics   • Alcohol use: No   • Drug use: No     Outpatient Medications Marked as Taking for the  6/3/21 encounter (Office Visit) with Kylie Booth APRN   Medication Sig Dispense Refill   • Adalimumab (Humira Pen) 40 MG/0.8ML Pen-injector Kit Humira Pen 40 MG/0.8ML Subcutaneous Pen-injector Kit QTY: 2 each Days: 28 Refills: 2  Written: 03/18/21 Patient Instructions: INJECT 0.8 MILLILITER (40 MG) BY SUBCUTANEOUS ROUTE EVERY 2 WEEKS IN THE ABDOMEN OR THIGH (ROTATE SITES)     • albuterol (PROVENTIL HFA;VENTOLIN HFA) 108 (90 Base) MCG/ACT inhaler Inhale 2 puffs Daily As Needed.     • albuterol sulfate HFA (Ventolin HFA) 108 (90 Base) MCG/ACT inhaler Daily.     • Humira Pen 40 MG/0.8ML Pen-injector Kit INJECT 0.8 MILLILITER (40 MG) BY SUBCUTANEOUS ROUTE EVERY 2 WEEKS IN THE ABDOMEN OR THIGH (ROTATE SITES)     • vitamin B-12 (CYANOCOBALAMIN) 1000 MCG tablet Take 1,000 mcg by mouth Daily.     • vitamin D3 125 MCG (5000 UT) capsule capsule Take 5,000 Units by mouth Daily.     • [DISCONTINUED] fluconazole (DIFLUCAN) 200 MG tablet Take 200 mg by mouth Daily.     • [DISCONTINUED] levoFLOXacin (LEVAQUIN) 750 MG tablet Take 750 mg by mouth Daily.       Allergies:  Codeine and Keflex [cephalexin]        Vital Signs:   Temp:  [97.7 °F (36.5 °C)] 97.7 °F (36.5 °C)  Heart Rate:  [64] 64    Physical Exam  Constitutional:       Appearance: She is normal weight.   HENT:      Head: Normocephalic.      Right Ear: Hearing, tympanic membrane, ear canal and external ear normal.      Left Ear: Hearing, tympanic membrane, ear canal and external ear normal.   Eyes:      General: Vision grossly intact.   Pulmonary:      Effort: Pulmonary effort is normal.   Neurological:      Mental Status: She is alert.   Psychiatric:         Attention and Perception: Attention normal.         Mood and Affect: Mood and affect normal.         Speech: Speech normal.                        Assessment:    1. Cigarette smoker    2. Ear itching    3. Tobacco use    4. Cigarette nicotine dependence without complication               Plan:        Call for  ear problems, especially change of hearing, ear pain or dizziness.  Protect getting water in the ears. If needed, may use over the counter silicone plugs or a cotton ball coated with vasoline when bathing.  Use hairdryer on a cool setting after bathing.     New Medications Ordered This Visit   Medications   • acetic acid-hydrocortisone (VOSOL-HC) 1-2 % otic solution     Sig: Administer 4 drops into ear(s) as directed by provider 2 (Two) Times a Day. 3-4 drops in the affected ear 2-3 times a day     Dispense:  35 mL     Refill:  0          Return in about 6 months (around 12/3/2021) for Follow up with KYRA Mazariegos.      KYRA Manley  06/03/21  10:00 CDT

## 2021-06-14 RX ORDER — NEOMYCIN SULFATE, POLYMYXIN B SULFATE, HYDROCORTISONE 3.5; 10000; 1 MG/ML; [USP'U]/ML; MG/ML
3 SOLUTION/ DROPS AURICULAR (OTIC) 2 TIMES DAILY
Qty: 10 ML | Refills: 0 | Status: SHIPPED | OUTPATIENT
Start: 2021-06-14

## 2021-06-14 NOTE — TELEPHONE ENCOUNTER
Patient calls stating her ears are draining from the vosol drops requests antibiotic or steroid.  Sending in cortisporin drops per amina king.

## 2021-06-16 ENCOUNTER — OFFICE VISIT (OUTPATIENT)
Dept: OTOLARYNGOLOGY | Facility: CLINIC | Age: 56
End: 2021-06-16

## 2021-06-16 VITALS
HEART RATE: 72 BPM | SYSTOLIC BLOOD PRESSURE: 157 MMHG | BODY MASS INDEX: 20.64 KG/M2 | DIASTOLIC BLOOD PRESSURE: 87 MMHG | WEIGHT: 148 LBS | TEMPERATURE: 97.2 F

## 2021-06-16 DIAGNOSIS — L30.9 DERMATITIS OF EXTERNAL EAR: Primary | ICD-10-CM

## 2021-06-16 PROCEDURE — 99214 OFFICE O/P EST MOD 30 MIN: CPT | Performed by: EMERGENCY MEDICINE

## 2021-06-16 RX ORDER — MOMETASONE FUROATE 1 MG/G
CREAM TOPICAL DAILY
Qty: 15 G | Refills: 0 | Status: SHIPPED | OUTPATIENT
Start: 2021-06-16 | End: 2021-06-23

## 2021-06-16 NOTE — PATIENT INSTRUCTIONS
CONTACT INFORMATION:  The main office phone number is [phone]. For emergencies after hours and on weekends, this number will convert over to our answering service and the on call provider will answer. Please try to keep non emergent phone calls/ questions to office hours 9am-5pm Monday through Friday.     [employer]  As an alternative, you can sign up and use the Epic MyChart system for more direct and quicker access for non emergent questions/ problems.  [employer] allows you to send messages to your doctor, view your test results, renew your prescriptions, schedule appointments, and more. To sign up, go to [employer] and click on the Sign Up Now link in the New User? box. Enter your [employer] Activation Code exactly as it appears below along with the last four digits of your Social Security Number and your Date of Birth () to complete the sign-up process. If you do not sign up before the expiration date, you must request a new code.    [employer] Activation Code: [email or web address]  Expires: 7/3/2021  9:58 AM    If you have questions, you can email [email or web address] or call [phone] to talk to our [employer] staff. Remember, [employer] is NOT to be used for urgent needs. For medical emergencies, dial 911.    IF YOU SMOKE OR USE TOBACCO PLEASE READ THE FOLLOWING:  Why is smoking bad for me?  Smoking increases the risk of heart disease, lung disease, vascular disease, stroke, and cancer. If you smoke, STOP!      IF YOU SMOKE OR USE TOBACCO PLEASE READ THE FOLLOWING:  Why is smoking bad for me?  Smoking increases the risk of heart disease, lung disease, vascular disease, stroke, and cancer. If you smoke, STOP!    For more information:  Quit Now [place]  -QUIT-NOW  [email or web address]

## 2021-06-16 NOTE — PROGRESS NOTES
KYRA Manley     Chief Complaint   Patient presents with   • Ear Problem          HPI  Sol Fontenot is a  56 y.o. female who is here for follow up. Patient reports she began having drainage from ears after starting vosol drops after her last visit on 6/3/21.  At the time of her last visit, she had similar complaints, however, drainage had self resolved.           Vital Signs:   Temp:  [97.2 °F (36.2 °C)] 97.2 °F (36.2 °C)  Heart Rate:  [72] 72  BP: (157)/(87) 157/87    Physical Exam  HENT:      Head: Normocephalic.      Right Ear: Tympanic membrane and ear canal normal.      Left Ear: Tympanic membrane and ear canal normal.      Ears:      Comments: External ears, dry and excoriated bilaterally  Neurological:      Mental Status: She is alert.                  Assessment/Plan    Assessment:   1. Dermatitis of external ear        Plan:        Call for ear problems, especially change of hearing, ear pain or dizziness.     New Medications Ordered This Visit   Medications   • mometasone (ELOCON) 0.1 % cream     Sig: Apply  topically to the appropriate area as directed Daily for 7 days. Apply to affected ear daily     Dispense:  15 g     Refill:  0          Return if symptoms worsen or fail to improve.         KYRA Manley  06/16/21  14:28 CDT

## 2022-01-11 ENCOUNTER — APPOINTMENT (OUTPATIENT)
Dept: GENERAL RADIOLOGY | Facility: HOSPITAL | Age: 57
End: 2022-01-11

## 2022-01-11 ENCOUNTER — HOSPITAL ENCOUNTER (EMERGENCY)
Facility: HOSPITAL | Age: 57
Discharge: HOME | End: 2022-01-11
Admitting: STUDENT IN AN ORGANIZED HEALTH CARE EDUCATION/TRAINING PROGRAM

## 2022-01-11 VITALS
HEIGHT: 68 IN | RESPIRATION RATE: 17 BRPM | DIASTOLIC BLOOD PRESSURE: 93 MMHG | WEIGHT: 140 LBS | HEART RATE: 93 BPM | TEMPERATURE: 98.4 F | SYSTOLIC BLOOD PRESSURE: 137 MMHG | OXYGEN SATURATION: 98 % | BODY MASS INDEX: 21.22 KG/M2

## 2022-01-11 DIAGNOSIS — U07.1 COVID-19: Primary | ICD-10-CM

## 2022-01-11 LAB
ALBUMIN SERPL-MCNC: 4.1 G/DL (ref 3.5–5.2)
ALBUMIN/GLOB SERPL: 1.5 G/DL
ALP SERPL-CCNC: 72 U/L (ref 39–117)
ALT SERPL W P-5'-P-CCNC: 9 U/L (ref 1–33)
AMORPH URATE CRY URNS QL MICRO: ABNORMAL /HPF
ANION GAP SERPL CALCULATED.3IONS-SCNC: 8 MMOL/L (ref 5–15)
AST SERPL-CCNC: 15 U/L (ref 1–32)
BACTERIA UR QL AUTO: ABNORMAL /HPF
BASOPHILS # BLD AUTO: 0.04 10*3/MM3 (ref 0–0.2)
BASOPHILS NFR BLD AUTO: 0.8 % (ref 0–1.5)
BILIRUB SERPL-MCNC: 0.3 MG/DL (ref 0–1.2)
BILIRUB UR QL STRIP: ABNORMAL
BUN SERPL-MCNC: 10 MG/DL (ref 6–20)
BUN/CREAT SERPL: 11.6 (ref 7–25)
CALCIUM SPEC-SCNC: 8.9 MG/DL (ref 8.6–10.5)
CHLORIDE SERPL-SCNC: 106 MMOL/L (ref 98–107)
CLARITY UR: ABNORMAL
CO2 SERPL-SCNC: 28 MMOL/L (ref 22–29)
COD CRY URNS QL: ABNORMAL /HPF
COLOR UR: ABNORMAL
CREAT SERPL-MCNC: 0.86 MG/DL (ref 0.57–1)
DEPRECATED RDW RBC AUTO: 42.3 FL (ref 37–54)
EOSINOPHIL # BLD AUTO: 0.02 10*3/MM3 (ref 0–0.4)
EOSINOPHIL NFR BLD AUTO: 0.4 % (ref 0.3–6.2)
ERYTHROCYTE [DISTWIDTH] IN BLOOD BY AUTOMATED COUNT: 13.9 % (ref 12.3–15.4)
FLUAV RNA RESP QL NAA+PROBE: NOT DETECTED
FLUBV RNA RESP QL NAA+PROBE: NOT DETECTED
GFR SERPL CREATININE-BSD FRML MDRD: 69 ML/MIN/1.73
GLOBULIN UR ELPH-MCNC: 2.8 GM/DL
GLUCOSE SERPL-MCNC: 91 MG/DL (ref 65–99)
GLUCOSE UR STRIP-MCNC: NEGATIVE MG/DL
HCT VFR BLD AUTO: 41.7 % (ref 34–46.6)
HGB BLD-MCNC: 14 G/DL (ref 12–15.9)
HGB UR QL STRIP.AUTO: ABNORMAL
HYALINE CASTS UR QL AUTO: ABNORMAL /LPF
IMM GRANULOCYTES # BLD AUTO: 0.01 10*3/MM3 (ref 0–0.05)
IMM GRANULOCYTES NFR BLD AUTO: 0.2 % (ref 0–0.5)
INR PPP: 1.06 (ref 0.91–1.09)
KETONES UR QL STRIP: ABNORMAL
LEUKOCYTE ESTERASE UR QL STRIP.AUTO: ABNORMAL
LIPASE SERPL-CCNC: 30 U/L (ref 13–60)
LYMPHOCYTES # BLD AUTO: 1.78 10*3/MM3 (ref 0.7–3.1)
LYMPHOCYTES NFR BLD AUTO: 34.3 % (ref 19.6–45.3)
MCH RBC QN AUTO: 28.8 PG (ref 26.6–33)
MCHC RBC AUTO-ENTMCNC: 33.6 G/DL (ref 31.5–35.7)
MCV RBC AUTO: 85.8 FL (ref 79–97)
MONOCYTES # BLD AUTO: 0.6 10*3/MM3 (ref 0.1–0.9)
MONOCYTES NFR BLD AUTO: 11.6 % (ref 5–12)
NEUTROPHILS NFR BLD AUTO: 2.74 10*3/MM3 (ref 1.7–7)
NEUTROPHILS NFR BLD AUTO: 52.7 % (ref 42.7–76)
NITRITE UR QL STRIP: NEGATIVE
NRBC BLD AUTO-RTO: 0 /100 WBC (ref 0–0.2)
PH UR STRIP.AUTO: <=5 [PH] (ref 5–8)
PLATELET # BLD AUTO: 134 10*3/MM3 (ref 140–450)
PMV BLD AUTO: 12.8 FL (ref 6–12)
POTASSIUM SERPL-SCNC: 3.6 MMOL/L (ref 3.5–5.2)
PROT SERPL-MCNC: 6.9 G/DL (ref 6–8.5)
PROT UR QL STRIP: ABNORMAL
PROTHROMBIN TIME: 13.4 SECONDS (ref 11.9–14.6)
RBC # BLD AUTO: 4.86 10*6/MM3 (ref 3.77–5.28)
RBC # UR STRIP: ABNORMAL /HPF
REF LAB TEST METHOD: ABNORMAL
SARS-COV-2 RNA RESP QL NAA+PROBE: DETECTED
SODIUM SERPL-SCNC: 142 MMOL/L (ref 136–145)
SP GR UR STRIP: 1.03 (ref 1–1.03)
SQUAMOUS #/AREA URNS HPF: ABNORMAL /HPF
TROPONIN T SERPL-MCNC: <0.01 NG/ML (ref 0–0.03)
UROBILINOGEN UR QL STRIP: ABNORMAL
WBC # UR STRIP: ABNORMAL /HPF
WBC NRBC COR # BLD: 5.19 10*3/MM3 (ref 3.4–10.8)

## 2022-01-11 PROCEDURE — 81001 URINALYSIS AUTO W/SCOPE: CPT | Performed by: NURSE PRACTITIONER

## 2022-01-11 PROCEDURE — 93005 ELECTROCARDIOGRAM TRACING: CPT | Performed by: NURSE PRACTITIONER

## 2022-01-11 PROCEDURE — 87636 SARSCOV2 & INF A&B AMP PRB: CPT | Performed by: NURSE PRACTITIONER

## 2022-01-11 PROCEDURE — 85025 COMPLETE CBC W/AUTO DIFF WBC: CPT | Performed by: NURSE PRACTITIONER

## 2022-01-11 PROCEDURE — 71045 X-RAY EXAM CHEST 1 VIEW: CPT

## 2022-01-11 PROCEDURE — 99283 EMERGENCY DEPT VISIT LOW MDM: CPT

## 2022-01-11 PROCEDURE — 83690 ASSAY OF LIPASE: CPT | Performed by: NURSE PRACTITIONER

## 2022-01-11 PROCEDURE — 93010 ELECTROCARDIOGRAM REPORT: CPT | Performed by: INTERNAL MEDICINE

## 2022-01-11 PROCEDURE — 85610 PROTHROMBIN TIME: CPT | Performed by: NURSE PRACTITIONER

## 2022-01-11 PROCEDURE — 84484 ASSAY OF TROPONIN QUANT: CPT | Performed by: NURSE PRACTITIONER

## 2022-01-11 PROCEDURE — 80053 COMPREHEN METABOLIC PANEL: CPT | Performed by: NURSE PRACTITIONER

## 2022-01-11 RX ORDER — ACETAMINOPHEN 500 MG
1000 TABLET ORAL ONCE
Status: COMPLETED | OUTPATIENT
Start: 2022-01-11 | End: 2022-01-11

## 2022-01-11 RX ORDER — ONDANSETRON 4 MG/1
4 TABLET, ORALLY DISINTEGRATING ORAL EVERY 6 HOURS PRN
Qty: 12 TABLET | Refills: 0 | Status: SHIPPED | OUTPATIENT
Start: 2022-01-11 | End: 2022-01-14

## 2022-01-11 RX ORDER — SODIUM CHLORIDE 0.9 % (FLUSH) 0.9 %
10 SYRINGE (ML) INJECTION AS NEEDED
Status: DISCONTINUED | OUTPATIENT
Start: 2022-01-11 | End: 2022-01-11 | Stop reason: HOSPADM

## 2022-01-11 RX ADMIN — SODIUM CHLORIDE, POTASSIUM CHLORIDE, SODIUM LACTATE AND CALCIUM CHLORIDE 1000 ML: 600; 310; 30; 20 INJECTION, SOLUTION INTRAVENOUS at 09:01

## 2022-01-11 RX ADMIN — ACETAMINOPHEN 1000 MG: 500 TABLET ORAL at 09:44

## 2022-01-11 NOTE — DISCHARGE INSTRUCTIONS
Drink plenty of fluid.  Tylenol or motrin as needed for pain/fever.  Medication as ordered.  Monitor oxygen level by pulse oximeter that can be bought over the counter.  Follow up with your PCP - call tomorrow for appointment. Return to ED if condition does not improve or worsens

## 2022-01-11 NOTE — ED PROVIDER NOTES
Subjective   56 yof with PMH of psoriatic arthritis, asthma, COPD and kidney stones presents with c/o vomiting, headache and body aches. Onset of symptoms was yesterday.  She denies fever.  She denies CP or SOB.  She states she has some pain with a deep breath at times.  No worsening of chronic cough.  No abdomen pain or diarrhea.  No dysuria.  She states she works as a  and possibly has been exposed to covid.  She states she takes humira for her arthritis twice monthly.  She states she did not receive the covid vaccination.          Review of Systems   Constitutional: Negative for activity change, appetite change, fatigue and fever.   HENT: Negative for congestion, ear pain, facial swelling and sore throat.    Eyes: Negative for discharge and visual disturbance.   Respiratory: Negative for apnea, chest tightness, shortness of breath, wheezing and stridor.    Cardiovascular: Negative for chest pain and palpitations.   Gastrointestinal: Positive for vomiting. Negative for abdominal distention, abdominal pain, diarrhea and nausea.   Genitourinary: Negative for difficulty urinating and dysuria.   Musculoskeletal: Negative for arthralgias and myalgias.   Skin: Negative for rash and wound.   Neurological: Positive for headaches. Negative for dizziness and seizures.   Psychiatric/Behavioral: Negative for agitation and confusion.       Past Medical History:   Diagnosis Date   • Arthritis    • Asthma    • COPD (chronic obstructive pulmonary disease) (HCC)    • Kidney stone        Allergies   Allergen Reactions   • Codeine Hives   • Keflex [Cephalexin] Hives       Past Surgical History:   Procedure Laterality Date   • COLONOSCOPY N/A 4/20/2021    Procedure: COLONOSCOPY WITH ANESTHESIA;  Surgeon: Ti Lea DO;  Location: [facility] ENDOSCOPY;  Service: Gastroenterology;  Laterality: N/A;  pre op: family hx polyps  post op:poor prep  PCP: Chip Hillman DO   • COLONOSCOPY N/A 4/21/2021    Procedure:  COLONOSCOPY WITH ANESTHESIA;  Surgeon: Ti Lea DO;  Location: [facility] ENDOSCOPY;  Service: Gastroenterology;  Laterality: N/A;  Pre: Family hx Colon Polyps  Post: Colon Polyp  Dr. Chip Hillman  CO2 Inflation Used   • HYSTERECTOMY  1996    Complete   • OOPHORECTOMY         Family History   Problem Relation Age of Onset   • Heart disease Mother    • Osteoporosis Mother    • Alcohol abuse Father    • Cancer Father    • Osteoporosis Father    • Osteoporosis Sister    • Breast cancer Sister    • Colon polyps Sister    • Cancer Brother    • Heart disease Brother    • Osteoporosis Brother    • Colon polyps Brother    • Esophageal cancer Neg Hx        Social History     Socioeconomic History   • Marital status:    Tobacco Use   • Smoking status: Current Every Day Smoker     Packs/day: 1.00     Years: 30.00     Pack years: 30.00     Types: Cigarettes   • Smokeless tobacco: Never Used   Vaping Use   • Vaping Use: Never used   Substance and Sexual Activity   • Alcohol use: No   • Drug use: No   • Sexual activity: Yes     Partners: Male     Birth control/protection: Surgical           Objective   Physical Exam  Vitals and nursing note reviewed.   Constitutional:       Appearance: She is well-developed.   HENT:      Head: Normocephalic.   Eyes:      Pupils: Pupils are equal, round, and reactive to light.   Cardiovascular:      Rate and Rhythm: Normal rate and regular rhythm.      Heart sounds: No murmur heard.      Pulmonary:      Effort: Pulmonary effort is normal.      Breath sounds: Normal breath sounds.   Abdominal:      General: Bowel sounds are normal. There is no distension.      Palpations: Abdomen is soft.      Tenderness: There is no abdominal tenderness. There is no right CVA tenderness or left CVA tenderness.   Musculoskeletal:         General: Normal range of motion.      Cervical back: Normal range of motion and neck supple.   Skin:     General: Skin is warm and dry.   Neurological:      Mental  Status: She is alert and oriented to person, place, and time.         Procedures           ED Course  ED Course as of 01/11/22 1546   Tue Jan 11, 2022   1037 Chest xray - IMPRESSION: 1. No radiographic evidence of acute cardiopulmonary process.  Blood work (CBC, CMP, troponin, lipase) are unremarkable.  Her flu test is negative.  Covid test is positive.  She will be dc'd home to f/u with PCP.  The patient voiced understanding of both results and instructions.  [KS]      ED Course User Index  [KS] Kathy Mathias, KYRA                                                 MDM  Number of Diagnoses or Management Options  COVID-19: minor     Amount and/or Complexity of Data Reviewed  Clinical lab tests: ordered and reviewed  Tests in the radiology section of CPT®: ordered and reviewed    Risk of Complications, Morbidity, and/or Mortality  Presenting problems: minimal  Diagnostic procedures: minimal    Patient Progress  Patient progress: stable      Final diagnoses:   COVID-19       ED Disposition  ED Disposition     ED Disposition Condition Comment    Discharge Stable           Chip Hillman, DO  [address] [address]  [phone]    Schedule an appointment as soon as possible for a visit in 1 day  Routine ED follow up         Medication List      New Prescriptions    ondansetron ODT 4 MG disintegrating tablet  Commonly known as: ZOFRAN-ODT  Place 1 tablet on the tongue Every 6 (Six) Hours As Needed for Nausea or Vomiting for up to 3 days.           Where to Get Your Medications      These medications were sent to [facility] [address] [place] [address] - [address] - [phone]  - [phone] [address] [address]    Phone: [phone]   · ondansetron ODT 4 MG disintegrating tablet          Kathy Mathias APRN  01/11/22 1546

## 2022-01-12 LAB
QT INTERVAL: 388 MS
QTC INTERVAL: 409 MS

## 2022-04-11 ENCOUNTER — TRANSCRIBE ORDERS (OUTPATIENT)
Dept: PHYSICAL THERAPY | Facility: CLINIC | Age: 57
End: 2022-04-11

## 2022-04-11 DIAGNOSIS — M25.561 RIGHT KNEE PAIN, UNSPECIFIED CHRONICITY: Primary | ICD-10-CM

## 2022-06-17 ENCOUNTER — TRANSCRIBE ORDERS (OUTPATIENT)
Dept: ADMINISTRATIVE | Facility: HOSPITAL | Age: 57
End: 2022-06-17

## 2022-06-17 DIAGNOSIS — Z12.31 ENCOUNTER FOR SCREENING MAMMOGRAM FOR MALIGNANT NEOPLASM OF BREAST: Primary | ICD-10-CM

## 2022-06-28 ENCOUNTER — HOSPITAL ENCOUNTER (OUTPATIENT)
Dept: MAMMOGRAPHY | Facility: HOSPITAL | Age: 57
Discharge: HOME | End: 2022-06-28
Admitting: INTERNAL MEDICINE

## 2022-06-28 DIAGNOSIS — Z12.31 ENCOUNTER FOR SCREENING MAMMOGRAM FOR MALIGNANT NEOPLASM OF BREAST: ICD-10-CM

## 2022-06-28 PROCEDURE — 77063 BREAST TOMOSYNTHESIS BI: CPT

## 2022-06-28 PROCEDURE — 77067 SCR MAMMO BI INCL CAD: CPT

## 2023-08-09 ENCOUNTER — APPOINTMENT (OUTPATIENT)
Dept: CT IMAGING | Facility: HOSPITAL | Age: 58
End: 2023-08-09
Payer: COMMERCIAL

## 2023-08-09 ENCOUNTER — APPOINTMENT (OUTPATIENT)
Dept: GENERAL RADIOLOGY | Facility: HOSPITAL | Age: 58
End: 2023-08-09
Payer: COMMERCIAL

## 2023-08-09 ENCOUNTER — HOSPITAL ENCOUNTER (EMERGENCY)
Facility: HOSPITAL | Age: 58
Discharge: HOME | End: 2023-08-09
Admitting: EMERGENCY MEDICINE
Payer: COMMERCIAL

## 2023-08-09 VITALS
DIASTOLIC BLOOD PRESSURE: 102 MMHG | WEIGHT: 145 LBS | OXYGEN SATURATION: 100 % | HEART RATE: 65 BPM | RESPIRATION RATE: 18 BRPM | SYSTOLIC BLOOD PRESSURE: 159 MMHG | HEIGHT: 71 IN | TEMPERATURE: 97.5 F | BODY MASS INDEX: 20.3 KG/M2

## 2023-08-09 DIAGNOSIS — R10.84 GENERALIZED ABDOMINAL PAIN: ICD-10-CM

## 2023-08-09 DIAGNOSIS — M62.838 MUSCLE SPASM: Primary | ICD-10-CM

## 2023-08-09 LAB
ALBUMIN SERPL-MCNC: 4.1 G/DL (ref 3.5–5.2)
ALBUMIN/GLOB SERPL: 1.6 G/DL
ALP SERPL-CCNC: 62 U/L (ref 39–117)
ALT SERPL W P-5'-P-CCNC: 15 U/L (ref 1–33)
AMPHET+METHAMPHET UR QL: NEGATIVE
AMPHETAMINES UR QL: NEGATIVE
ANION GAP SERPL CALCULATED.3IONS-SCNC: 13 MMOL/L (ref 5–15)
AST SERPL-CCNC: 15 U/L (ref 1–32)
BARBITURATES UR QL SCN: NEGATIVE
BASOPHILS # BLD AUTO: 0.05 10*3/MM3 (ref 0–0.2)
BASOPHILS NFR BLD AUTO: 0.3 % (ref 0–1.5)
BENZODIAZ UR QL SCN: NEGATIVE
BILIRUB SERPL-MCNC: 0.5 MG/DL (ref 0–1.2)
BILIRUB UR QL STRIP: NEGATIVE
BUN SERPL-MCNC: 19 MG/DL (ref 6–20)
BUN/CREAT SERPL: 23.2 (ref 7–25)
BUPRENORPHINE SERPL-MCNC: NEGATIVE NG/ML
CALCIUM SPEC-SCNC: 9.2 MG/DL (ref 8.6–10.5)
CANNABINOIDS SERPL QL: POSITIVE
CHLORIDE SERPL-SCNC: 102 MMOL/L (ref 98–107)
CLARITY UR: CLEAR
CO2 SERPL-SCNC: 24 MMOL/L (ref 22–29)
COCAINE UR QL: NEGATIVE
COLOR UR: YELLOW
CREAT SERPL-MCNC: 0.82 MG/DL (ref 0.57–1)
D-LACTATE SERPL-SCNC: 3.8 MMOL/L (ref 0.5–2)
DEPRECATED RDW RBC AUTO: 47.2 FL (ref 37–54)
EGFRCR SERPLBLD CKD-EPI 2021: 83.5 ML/MIN/1.73
EOSINOPHIL # BLD AUTO: 0.01 10*3/MM3 (ref 0–0.4)
EOSINOPHIL NFR BLD AUTO: 0.1 % (ref 0.3–6.2)
ERYTHROCYTE [DISTWIDTH] IN BLOOD BY AUTOMATED COUNT: 15.6 % (ref 12.3–15.4)
FENTANYL UR-MCNC: NEGATIVE NG/ML
GEN 5 2HR TROPONIN T REFLEX: 12 NG/L
GLOBULIN UR ELPH-MCNC: 2.6 GM/DL
GLUCOSE SERPL-MCNC: 104 MG/DL (ref 65–99)
GLUCOSE UR STRIP-MCNC: NEGATIVE MG/DL
HCT VFR BLD AUTO: 45.6 % (ref 34–46.6)
HGB BLD-MCNC: 15.1 G/DL (ref 12–15.9)
HGB UR QL STRIP.AUTO: NEGATIVE
IMM GRANULOCYTES # BLD AUTO: 0.31 10*3/MM3 (ref 0–0.05)
IMM GRANULOCYTES NFR BLD AUTO: 1.6 % (ref 0–0.5)
INR PPP: 0.88 (ref 0.91–1.09)
KETONES UR QL STRIP: NEGATIVE
LEUKOCYTE ESTERASE UR QL STRIP.AUTO: NEGATIVE
LYMPHOCYTES # BLD AUTO: 3.16 10*3/MM3 (ref 0.7–3.1)
LYMPHOCYTES NFR BLD AUTO: 15.9 % (ref 19.6–45.3)
MCH RBC QN AUTO: 29.4 PG (ref 26.6–33)
MCHC RBC AUTO-ENTMCNC: 33.1 G/DL (ref 31.5–35.7)
MCV RBC AUTO: 88.9 FL (ref 79–97)
METHADONE UR QL SCN: NEGATIVE
MONOCYTES # BLD AUTO: 1.22 10*3/MM3 (ref 0.1–0.9)
MONOCYTES NFR BLD AUTO: 6.1 % (ref 5–12)
NEUTROPHILS NFR BLD AUTO: 15.13 10*3/MM3 (ref 1.7–7)
NEUTROPHILS NFR BLD AUTO: 76 % (ref 42.7–76)
NITRITE UR QL STRIP: NEGATIVE
NRBC BLD AUTO-RTO: 0 /100 WBC (ref 0–0.2)
NT-PROBNP SERPL-MCNC: 288.9 PG/ML (ref 0–900)
OPIATES UR QL: POSITIVE
OXYCODONE UR QL SCN: NEGATIVE
PCP UR QL SCN: NEGATIVE
PH UR STRIP.AUTO: 8 [PH] (ref 5–8)
PLATELET # BLD AUTO: 215 10*3/MM3 (ref 140–450)
PMV BLD AUTO: 11.5 FL (ref 6–12)
POTASSIUM SERPL-SCNC: 3.9 MMOL/L (ref 3.5–5.2)
PROCALCITONIN SERPL-MCNC: 0.04 NG/ML (ref 0–0.25)
PROPOXYPH UR QL: NEGATIVE
PROT SERPL-MCNC: 6.7 G/DL (ref 6–8.5)
PROT UR QL STRIP: NEGATIVE
PROTHROMBIN TIME: 12 SECONDS (ref 11.8–14.8)
QT INTERVAL: 460 MS
QTC INTERVAL: 410 MS
RBC # BLD AUTO: 5.13 10*6/MM3 (ref 3.77–5.28)
SODIUM SERPL-SCNC: 139 MMOL/L (ref 136–145)
SP GR UR STRIP: 1.02 (ref 1–1.03)
TRICYCLICS UR QL SCN: NEGATIVE
TROPONIN T DELTA: 1 NG/L
TROPONIN T SERPL HS-MCNC: 11 NG/L
UROBILINOGEN UR QL STRIP: NORMAL
WBC NRBC COR # BLD: 19.88 10*3/MM3 (ref 3.4–10.8)

## 2023-08-09 PROCEDURE — 71275 CT ANGIOGRAPHY CHEST: CPT

## 2023-08-09 PROCEDURE — 36415 COLL VENOUS BLD VENIPUNCTURE: CPT

## 2023-08-09 PROCEDURE — 96374 THER/PROPH/DIAG INJ IV PUSH: CPT

## 2023-08-09 PROCEDURE — 71045 X-RAY EXAM CHEST 1 VIEW: CPT

## 2023-08-09 PROCEDURE — 83880 ASSAY OF NATRIURETIC PEPTIDE: CPT | Performed by: NURSE PRACTITIONER

## 2023-08-09 PROCEDURE — 96375 TX/PRO/DX INJ NEW DRUG ADDON: CPT

## 2023-08-09 PROCEDURE — 85025 COMPLETE CBC W/AUTO DIFF WBC: CPT | Performed by: NURSE PRACTITIONER

## 2023-08-09 PROCEDURE — 99285 EMERGENCY DEPT VISIT HI MDM: CPT

## 2023-08-09 PROCEDURE — 83605 ASSAY OF LACTIC ACID: CPT | Performed by: NURSE PRACTITIONER

## 2023-08-09 PROCEDURE — 74174 CTA ABD&PLVS W/CONTRAST: CPT

## 2023-08-09 PROCEDURE — 25010000002 ONDANSETRON PER 1 MG: Performed by: NURSE PRACTITIONER

## 2023-08-09 PROCEDURE — 84484 ASSAY OF TROPONIN QUANT: CPT | Performed by: NURSE PRACTITIONER

## 2023-08-09 PROCEDURE — 81003 URINALYSIS AUTO W/O SCOPE: CPT | Performed by: NURSE PRACTITIONER

## 2023-08-09 PROCEDURE — 96376 TX/PRO/DX INJ SAME DRUG ADON: CPT

## 2023-08-09 PROCEDURE — 80307 DRUG TEST PRSMV CHEM ANLYZR: CPT | Performed by: STUDENT IN AN ORGANIZED HEALTH CARE EDUCATION/TRAINING PROGRAM

## 2023-08-09 PROCEDURE — 80053 COMPREHEN METABOLIC PANEL: CPT | Performed by: NURSE PRACTITIONER

## 2023-08-09 PROCEDURE — 84145 PROCALCITONIN (PCT): CPT | Performed by: NURSE PRACTITIONER

## 2023-08-09 PROCEDURE — 93005 ELECTROCARDIOGRAM TRACING: CPT | Performed by: EMERGENCY MEDICINE

## 2023-08-09 PROCEDURE — 0 HYDROMORPHONE 1 MG/ML SOLUTION: Performed by: NURSE PRACTITIONER

## 2023-08-09 PROCEDURE — 85610 PROTHROMBIN TIME: CPT | Performed by: NURSE PRACTITIONER

## 2023-08-09 PROCEDURE — 25510000001 IOPAMIDOL PER 1 ML: Performed by: NURSE PRACTITIONER

## 2023-08-09 RX ORDER — HYDROCODONE BITARTRATE AND ACETAMINOPHEN 5; 325 MG/1; MG/1
1 TABLET ORAL EVERY 6 HOURS PRN
Qty: 12 TABLET | Refills: 0 | Status: SHIPPED | OUTPATIENT
Start: 2023-08-09

## 2023-08-09 RX ORDER — SODIUM CHLORIDE 0.9 % (FLUSH) 0.9 %
10 SYRINGE (ML) INJECTION AS NEEDED
Status: DISCONTINUED | OUTPATIENT
Start: 2023-08-09 | End: 2023-08-09 | Stop reason: HOSPADM

## 2023-08-09 RX ORDER — ONDANSETRON 2 MG/ML
4 INJECTION INTRAMUSCULAR; INTRAVENOUS ONCE
Status: COMPLETED | OUTPATIENT
Start: 2023-08-09 | End: 2023-08-09

## 2023-08-09 RX ORDER — CYCLOBENZAPRINE HCL 10 MG
10 TABLET ORAL 3 TIMES DAILY PRN
Qty: 20 TABLET | Refills: 0 | Status: SHIPPED | OUTPATIENT
Start: 2023-08-09

## 2023-08-09 RX ADMIN — HYDROMORPHONE HYDROCHLORIDE 1 MG: 1 INJECTION, SOLUTION INTRAMUSCULAR; INTRAVENOUS; SUBCUTANEOUS at 12:25

## 2023-08-09 RX ADMIN — SODIUM CHLORIDE, POTASSIUM CHLORIDE, SODIUM LACTATE AND CALCIUM CHLORIDE 1000 ML: 600; 310; 30; 20 INJECTION, SOLUTION INTRAVENOUS at 13:44

## 2023-08-09 RX ADMIN — IOPAMIDOL 100 ML: 755 INJECTION, SOLUTION INTRAVENOUS at 13:29

## 2023-08-09 RX ADMIN — ONDANSETRON 4 MG: 2 INJECTION INTRAMUSCULAR; INTRAVENOUS at 12:26

## 2023-08-09 RX ADMIN — HYDROMORPHONE HYDROCHLORIDE 1 MG: 1 INJECTION, SOLUTION INTRAMUSCULAR; INTRAVENOUS; SUBCUTANEOUS at 14:35

## 2023-08-09 NOTE — Clinical Note
[facility] EMERGENCY DEPARTMENT  [address]  [facility] [address]  Phone: [phone]    Sol Fontenot was seen and treated in our emergency department on 8/9/2023.  She may return to work on 08/12/2023.         Thank you for choosing [facility].    Lauren Chapman APRN

## 2023-08-09 NOTE — Clinical Note
[facility] EMERGENCY DEPARTMENT  [address]  [facility] [address]  Phone: [phone]    Sol Fontenot was seen and treated in our emergency department on 8/9/2023.  She may return to work on 08/12/2023.         Thank you for choosing [facility].    Lauren Chapman APRN

## 2023-08-09 NOTE — ED PROVIDER NOTES
Subjective   History of Present Illness  Patient is a 58-year-old female presents the emergency department with sudden onset chest tightness, abdominal pain, nausea that radiated into low back.  She states that she has had constipation over the last week he has only had 1 small bowel movement within the last week.  She states she had gotten up to go the bathroom to urinate and when she got up off the toilet she felt a sharp pain in her left side abdomen and chest pain as well as shortness of breath.  She states the pain radiation radiated into her lower back.  She denies any fall.  She states she had nausea as well.  She states she used her inhaler because she was so short of breath which did help some.  She states she is in a lot of pain to the left lower quadrant abdomen and left side of her back.  She does smoke about a half a pack a day.  She has a history of arthritis, asthma, COPD.  She states she is recently been on steroids for some skin issues.  She states the only problem she was having prior to this incident was some constipation.  She denies any fever or chills.  No cough or congestion.    History provided by:  Patient   used: No      Review of Systems   Constitutional: Negative.    HENT: Negative.     Eyes: Negative.    Respiratory:  Positive for shortness of breath.    Cardiovascular:  Positive for chest pain.   Gastrointestinal:  Positive for abdominal pain and nausea.   Endocrine: Negative.    Genitourinary: Negative.    Musculoskeletal: Negative.    Skin: Negative.    Allergic/Immunologic: Negative.    Neurological: Negative.    Hematological: Negative.    Psychiatric/Behavioral: Negative.     All other systems reviewed and are negative.    Past Medical History:   Diagnosis Date    Arthritis     Asthma     COPD (chronic obstructive pulmonary disease)     Kidney stone        Allergies   Allergen Reactions    Codeine Hives    Keflex [Cephalexin] Hives       Past Surgical History:    Procedure Laterality Date    COLONOSCOPY N/A 04/20/2021    Procedure: COLONOSCOPY WITH ANESTHESIA;  Surgeon: Ti Lea DO;  Location:  [facility];  Service: Gastroenterology;  Laterality: N/A;  pre op: family hx polyps  post op:poor prep  PCP: Chip Hillman DO    COLONOSCOPY N/A 04/21/2021    Procedure: COLONOSCOPY WITH ANESTHESIA;  Surgeon: Ti Lea DO;  Location:  [facility];  Service: Gastroenterology;  Laterality: N/A;  Pre: Family hx Colon Polyps  Post: Colon Polyp  Dr. Chip Hillman  CO2 Inflation Used    HYSTERECTOMY  1996    Complete    OOPHORECTOMY         Family History   Problem Relation Age of Onset    Heart disease Mother     Osteoporosis Mother     Alcohol abuse Father     Cancer Father     Osteoporosis Father     Osteoporosis Sister     Breast cancer Sister     Colon polyps Sister     Cancer Brother     Heart disease Brother     Osteoporosis Brother     Colon polyps Brother     Esophageal cancer Neg Hx        Social History     Socioeconomic History    Marital status:    Tobacco Use    Smoking status: Every Day     Packs/day: 1.00     Years: 30.00     Pack years: 30.00     Types: Cigarettes    Smokeless tobacco: Never   Vaping Use    Vaping Use: Never used   Substance and Sexual Activity    Alcohol use: No    Drug use: No    Sexual activity: Yes     Partners: Male     Birth control/protection: Surgical       Prior to Admission medications    Medication Sig Start Date End Date Taking? Authorizing Provider   acetic acid-hydrocortisone (VOSOL-HC) 1-2 % otic solution Administer 4 drops into ear(s) as directed by provider 2 (Two) Times a Day. 3-4 drops in the affected ear 2-3 times a day 6/3/21   Kylie Booth APRN   albuterol (PROVENTIL HFA;VENTOLIN HFA) 108 (90 Base) MCG/ACT inhaler Inhale 2 puffs Daily As Needed. 3/17/17   ProviderPam MD   albuterol sulfate HFA (Ventolin HFA) 108 (90 Base) MCG/ACT inhaler Daily. 3/17/21   Pam Mendez MD  "  fluticasone (FLONASE) 50 MCG/ACT nasal spray  3/17/21   Pam Mendez MD   Humira Pen 40 MG/0.8ML Pen-injector Kit INJECT 0.8 MILLILITER (40 MG) BY SUBCUTANEOUS ROUTE EVERY 2 WEEKS IN THE ABDOMEN OR THIGH (ROTATE SITES) 3/19/21   Pam Mendez MD   neomycin-polymyxin-hydrocortisone (CORTISPORIN) 1 % solution otic solution Administer 3 drops into both ears 2 (Two) Times a Day. 6/14/21   Kylie Booth APRN   vitamin B-12 (CYANOCOBALAMIN) 1000 MCG tablet Take 1,000 mcg by mouth Daily. 1/25/21   Pam Mendez MD   vitamin D (ERGOCALCIFEROL) 1.25 MG (80027 UT) capsule capsule TAKE 1 CAPSULE BY ORAL ROUTE 1 TIME PER WEEK ON SAME DAY EACH WEEK. 6/1/21   Pam Mendez MD   vitamin D3 125 MCG (5000 UT) capsule capsule Take 5,000 Units by mouth Daily.    Pam Mendez MD       BP (!) 159/102   Pulse 65   Temp 97.5 øF (36.4 øC)   Resp 18   Ht 180.3 cm (71\")   Wt 65.8 kg (145 lb)   SpO2 100%   BMI 20.22 kg/mý     Objective   Physical Exam  Vitals and nursing note reviewed.   Constitutional:       Appearance: She is well-developed.      Comments: Appears to be in pain.  Nontoxic-appearing   HENT:      Head: Normocephalic and atraumatic.   Eyes:      Conjunctiva/sclera: Conjunctivae normal.      Pupils: Pupils are equal, round, and reactive to light.   Neck:      Thyroid: No thyromegaly.      Trachea: No tracheal deviation.   Cardiovascular:      Rate and Rhythm: Normal rate and regular rhythm.      Heart sounds: Normal heart sounds.   Pulmonary:      Effort: Pulmonary effort is normal. No respiratory distress.      Breath sounds: Normal breath sounds. No wheezing or rales.   Chest:      Chest wall: No tenderness.   Abdominal:      General: Bowel sounds are normal.      Palpations: Abdomen is soft.      Comments: Abdomen is soft, nondistended.  She has moderate tenderness noted in the left lower quadrant abdomen that rates in the left flank area into the left side of her back. "  Bowel sounds are present in all 4 quadrants.  Peripheral pulses palpable.   Musculoskeletal:         General: Normal range of motion.      Cervical back: Normal range of motion and neck supple.   Skin:     General: Skin is warm and dry.   Neurological:      Mental Status: She is alert and oriented to person, place, and time.      Cranial Nerves: No cranial nerve deficit.      Deep Tendon Reflexes: Reflexes are normal and symmetric.   Psychiatric:         Behavior: Behavior normal.         Thought Content: Thought content normal.         Judgment: Judgment normal.       Procedures         Lab Results (last 24 hours)       Procedure Component Value Units Date/Time    CBC & Differential [[id number]]  (Abnormal) Collected: 08/09/23 1224    Specimen: Blood Updated: 08/09/23 1242    Narrative:      The following orders were created for panel order CBC & Differential.  Procedure                               Abnormality         Status                     ---------                               -----------         ------                     CBC Auto Differential[[id number]]        Abnormal            Final result                 Please view results for these tests on the individual orders.    Comprehensive Metabolic Panel [[id number]]  (Abnormal) Collected: 08/09/23 1224    Specimen: Blood Updated: 08/09/23 1307     Glucose 104 mg/dL      BUN 19 mg/dL      Creatinine 0.82 mg/dL      Sodium 139 mmol/L      Potassium 3.9 mmol/L      Comment: Slight hemolysis detected by analyzer. Results may be affected.        Chloride 102 mmol/L      CO2 24.0 mmol/L      Calcium 9.2 mg/dL      Total Protein 6.7 g/dL      Albumin 4.1 g/dL      ALT (SGPT) 15 U/L      AST (SGOT) 15 U/L      Comment: Slight hemolysis detected by analyzer. Results may be affected.        Alkaline Phosphatase 62 U/L      Total Bilirubin 0.5 mg/dL      Globulin 2.6 gm/dL      A/G Ratio 1.6 g/dL      BUN/Creatinine Ratio 23.2     Anion Gap 13.0 mmol/L      eGFR 83.5  "mL/min/1.73     Narrative:      GFR Normal >60  Chronic Kidney Disease <60  Kidney Failure <15      Protime-INR [[id number]]  (Abnormal) Collected: 08/09/23 1224    Specimen: Blood Updated: 08/09/23 1252     Protime 12.0 Seconds      INR 0.88    Lactic Acid, Plasma [[id number]]  (Abnormal) Collected: 08/09/23 1224    Specimen: Blood Updated: 08/09/23 1306     Lactate 3.8 mmol/L     Procalcitonin [[id number]]  (Normal) Collected: 08/09/23 1224    Specimen: Blood Updated: 08/09/23 1309     Procalcitonin 0.04 ng/mL     Narrative:      As a Marker for Sepsis (Non-Neonates):    1. <0.5 ng/mL represents a low risk of severe sepsis and/or septic shock.  2. >2 ng/mL represents a high risk of severe sepsis and/or septic shock.    As a Marker for Lower Respiratory Tract Infections that require antibiotic therapy:    PCT on Admission    Antibiotic Therapy       6-12 Hrs later    >0.5                Strongly Recommended  >0.25 - <0.5        Recommended   0.1 - 0.25          Discouraged              Remeasure/reassess PCT  <0.1                Strongly Discouraged     Remeasure/reassess PCT    As 28 day mortality risk marker: \"Change in Procalcitonin Result\" (>80% or <=80%) if Day 0 (or Day 1) and Day 4 values are available. Refer to http://www.myNoticePeriod.coms-pct-calculator.com    Change in PCT <=80%  A decrease of PCT levels below or equal to 80% defines a positive change in PCT test result representing a higher risk for 28-day all-cause mortality of patients diagnosed with severe sepsis for septic shock.    Change in PCT >80%  A decrease of PCT levels of more than 80% defines a negative change in PCT result representing a lower risk for 28-day all-cause mortality of patients diagnosed with severe sepsis or septic shock.       High Sensitivity Troponin T [[id number]]  (Abnormal) Collected: 08/09/23 1224    Specimen: Blood Updated: 08/09/23 1301     HS Troponin T 11 ng/L     Narrative:      High Sensitive Troponin T Reference Range:  <10.0 " ng/L- Negative Female for AMI  <15.0 ng/L- Negative Male for AMI  >=10 - Abnormal Female indicating possible myocardial injury.  >=15 - Abnormal Male indicating possible myocardial injury.   Clinicians would have to utilize clinical acumen, EKG, Troponin, and serial changes to determine if it is an Acute Myocardial Infarction or myocardial injury due to an underlying chronic condition.         BNP [[id number]]  (Normal) Collected: 08/09/23 1224    Specimen: Blood Updated: 08/09/23 1302     proBNP 288.9 pg/mL     Narrative:      Among patients with dyspnea, NT-proBNP is highly sensitive for the detection of acute congestive heart failure. In addition NT-proBNP of <300 pg/ml effectively rules out acute congestive heart failure with 99% negative predictive value.      CBC Auto Differential [[id number]]  (Abnormal) Collected: 08/09/23 1224    Specimen: Blood Updated: 08/09/23 1242     WBC 19.88 10*3/mm3      RBC 5.13 10*6/mm3      Hemoglobin 15.1 g/dL      Hematocrit 45.6 %      MCV 88.9 fL      MCH 29.4 pg      MCHC 33.1 g/dL      RDW 15.6 %      RDW-SD 47.2 fl      MPV 11.5 fL      Platelets 215 10*3/mm3      Neutrophil % 76.0 %      Lymphocyte % 15.9 %      Monocyte % 6.1 %      Eosinophil % 0.1 %      Basophil % 0.3 %      Immature Grans % 1.6 %      Neutrophils, Absolute 15.13 10*3/mm3      Lymphocytes, Absolute 3.16 10*3/mm3      Monocytes, Absolute 1.22 10*3/mm3      Eosinophils, Absolute 0.01 10*3/mm3      Basophils, Absolute 0.05 10*3/mm3      Immature Grans, Absolute 0.31 10*3/mm3      nRBC 0.0 /100 WBC     Urinalysis With Culture If Indicated - Urine, Clean Catch [[id number]]  (Normal) Collected: 08/09/23 1355    Specimen: Urine, Clean Catch Updated: 08/09/23 1414     Color, UA Yellow     Appearance, UA Clear     pH, UA 8.0     Specific Gravity, UA 1.025     Glucose, UA Negative     Ketones, UA Negative     Bilirubin, UA Negative     Blood, UA Negative     Protein, UA Negative     Leuk Esterase, UA Negative      Nitrite, UA Negative     Urobilinogen, UA 0.2 E.U./dL    Narrative:      In absence of clinical symptoms, the presence of pyuria, bacteria, and/or nitrites on the urinalysis result does not correlate with infection.  Urine microscopic not indicated.    Urine Drug Screen - Urine, Clean Catch [[id number]]  (Abnormal) Collected: 08/09/23 1433    Specimen: Urine, Clean Catch Updated: 08/09/23 1450     THC, Screen, Urine Positive     Phencyclidine (PCP), Urine Negative     Cocaine Screen, Urine Negative     Methamphetamine, Ur Negative     Opiate Screen Positive     Amphetamine Screen, Urine Negative     Benzodiazepine Screen, Urine Negative     Tricyclic Antidepressants Screen Negative     Methadone Screen, Urine Negative     Barbiturates Screen, Urine Negative     Oxycodone Screen, Urine Negative     Propoxyphene Screen Negative     Buprenorphine, Screen, Urine Negative    Narrative:      Cutoff For Drugs Screened:    Amphetamines               500 ng/ml  Barbiturates               200 ng/ml  Benzodiazepines            150 ng/ml  Cocaine                    150 ng/ml  Methadone                  200 ng/ml  Opiates                    100 ng/ml  Phencyclidine               25 ng/ml  THC                            50 ng/ml  Methamphetamine            500 ng/ml  Tricyclic Antidepressants  300 ng/ml  Oxycodone                  100 ng/ml  Propoxyphene               300 ng/ml  Buprenorphine               10 ng/ml    The normal value for all drugs tested is negative. This report includes unconfirmed screening results, with the cutoff values listed, to be used for medical treatment purposes only.  Unconfirmed results must not be used for non-medical purposes such as employment or legal testing.  Clinical consideration should be applied to any drug of abuse test, particularly when unconfirmed results are used.      Fentanyl, Urine - Urine, Clean Catch [[id number]]  (Normal) Collected: 08/09/23 1433    Specimen: Urine, Clean Catch  Updated: 08/09/23 1450     Fentanyl, Urine Negative    Narrative:      Negative Threshold:      Fentanyl 5 ng/mL     The normal value for the drug tested is negative. This report includes final unconfirmed screening results to be used for medical treatment purposes only. Unconfirmed results must not be used for non-medical purposes such as employment or legal testing. Clinical consideration should be applied to any drug of abuse test, particularly when unconfirmed results are used.           High Sensitivity Troponin T 2Hr [[id number]]  (Abnormal) Collected: 08/09/23 1439    Specimen: Blood Updated: 08/09/23 1511     HS Troponin T 12 ng/L      Troponin T Delta 1 ng/L     Narrative:      High Sensitive Troponin T Reference Range:  <10.0 ng/L- Negative Female for AMI  <15.0 ng/L- Negative Male for AMI  >=10 - Abnormal Female indicating possible myocardial injury.  >=15 - Abnormal Male indicating possible myocardial injury.   Clinicians would have to utilize clinical acumen, EKG, Troponin, and serial changes to determine if it is an Acute Myocardial Infarction or myocardial injury due to an underlying chronic condition.                 CT Angiogram Chest   Final Result       No aneurysm, dissection, or pulmonary embolism.       No acute findings in the chest.           This report was finalized on 08/09/2023 13:46 by  Parminder Sanchez DO.      CT Angiogram Abdomen Pelvis   Final Result       No aortic aneurysm or dissection.       No definite acute findings in the abdomen/pelvis.       Mild circumferential thickening of the transverse colon which may be due   to partially collapsed state. Correlate for mild colitis.       Mildly distended bladder.       This report was finalized on 08/09/2023 13:53 by  Parminder Sanchez DO.      XR Chest 1 View   Final Result   No active disease is seen.           This report was finalized on 08/09/2023 12:36 by Dr. Adi Christine MD.          ED Course  ED Course as of 08/09/23 8332   Wed  Aug 09, 2023   1504 Cta of chest and abd - negative for dissection, pulmonary embolus, or aneurysm. Reviewed pt and pt care plan with dr ramirez- also assessed pt and in agreement with care plan. Pt states that she does have bulging disc in lower back but normally doesn't bother her too bad. She states that her pain is better after 2nd dose of dilaudid and that she is feeling better at this time. Dr ramirez also assessed pt and in agreement with care plan- feels that pt most likely is having muscles spasms. Pt leukocytosis is most likely related to recent use of steroids for psoriasis. She states that she is taking steroids at this time. Advised pt of findings and care pan. Advised to follow up with Dr. Hillman this week and may need mri of back. Pt in agreement with care plan and states that she feels like she can go home with pain medications. Dr ramirez advised ok to send pt home with small amt of pain medications for acute pain. Reviewed side effects and potential for abuse. [place] report completed and there is  [CW]   1519 No sign of suspicious activity.  [CW]      ED Course User Index  [CW] Lauren Chapman APRN        Medical Decision Making  Patient is a 58-year-old female presents the emergency department with sudden onset chest tightness, abdominal pain, nausea that radiated into low back.  She states that she has had constipation over the last week he has only had 1 small bowel movement within the last week.  She states she had gotten up to go the bathroom to urinate and when she got up off the toilet she felt a sharp pain in her left side abdomen and chest pain as well as shortness of breath.  She states the pain radiation radiated into her lower back.  She denies any fall.  She states she had nausea as well.  She states she used her inhaler because she was so short of breath which did help some.  She states she is in a lot of pain to the left lower quadrant abdomen and left side of her back.  She  does smoke about a half a pack a day.  She has a history of arthritis, asthma, COPD.  She states she is recently been on steroids for some skin issues.  She states the only problem she was having prior to this incident was some constipation.  She denies any fever or chills.  No cough or congestion.  Course of treatment in the er: Patient 58-year-old female present emergency department with acute onset of shortness of breath, abdominal pain that radiated into the back.  She states she was using the toilet and was urinating and went to get up from the toilet and felt sharp pain.  She states she felt some chest tightness and shortness of breath as well.  She states she did not fall.  She denies any numbness or focal weakness of the legs.  She does have a history of degenerative disc disease but states that she normally does not have any problems with her back.  She denies any chest pain but states she had chest tightness.  Her course of treatment emergency department included emergent CTA of the chest and abdomen to rule out dissection which were both negative for dissection or pulmonary embolus.  Her laboratory studies Labs Reviewed  COMPREHENSIVE METABOLIC PANEL - Abnormal; Notable for the following components:     Glucose                       104 (*)             All other components within normal limits         Narrative: GFR Normal >60                  Chronic Kidney Disease <60                  Kidney Failure <15                    PROTIME-INR - Abnormal; Notable for the following components:     INR                           0.88 (*)            All other components within normal limits  LACTIC ACID, PLASMA - Abnormal; Notable for the following components:     Lactate                       3.8 (*)             All other components within normal limits  TROPONIN - Abnormal; Notable for the following components:     HS Troponin T                 11 (*)              All other components within normal limits          Narrative: High Sensitive Troponin T Reference Range:                  <10.0 ng/L- Negative Female for AMI                  <15.0 ng/L- Negative Male for AMI                  >=10 - Abnormal Female indicating possible myocardial injury.                  >=15 - Abnormal Male indicating possible myocardial injury.                   Clinicians would have to utilize clinical acumen, EKG, Troponin, and serial changes to determine if it is an Acute Myocardial Infarction or myocardial injury due to an underlying chronic condition.                                       CBC WITH AUTO DIFFERENTIAL - Abnormal; Notable for the following components:     WBC                           19.88 (*)               RDW                           15.6 (*)               Lymphocyte %                  15.9 (*)               Eosinophil %                  0.1 (*)                Immature Grans %              1.6 (*)                Neutrophils, Absolute         15.13 (*)               Lymphocytes, Absolute         3.16 (*)               Monocytes, Absolute           1.22 (*)               Immature Grans, Absolute      0.31 (*)            All other components within normal limits  HIGH SENSITIVITIY TROPONIN T 2HR - Abnormal; Notable for the following components:     HS Troponin T                 12 (*)              All other components within normal limits         Narrative: High Sensitive Troponin T Reference Range:                  <10.0 ng/L- Negative Female for AMI                  <15.0 ng/L- Negative Male for AMI                  >=10 - Abnormal Female indicating possible myocardial injury.                  >=15 - Abnormal Male indicating possible myocardial injury.                   Clinicians would have to utilize clinical acumen, EKG, Troponin, and serial changes to determine if it is an Acute Myocardial Infarction or myocardial injury due to an underlying chronic condition.                                       URINE DRUG SCREEN -  Abnormal; Notable for the following components:     THC, Screen, Urine            Positive (*)               Opiate Screen                 Positive (*)            All other components within normal limits         Narrative: Cutoff For Drugs Screened:                                    Amphetamines               500 ng/ml                  Barbiturates               200 ng/ml                  Benzodiazepines            150 ng/ml                  Cocaine                    150 ng/ml                  Methadone                  200 ng/ml                  Opiates                    100 ng/ml                  Phencyclidine               25 ng/ml                  THC                            50 ng/ml                  Methamphetamine            500 ng/ml                  Tricyclic Antidepressants  300 ng/ml                  Oxycodone                  100 ng/ml                  Propoxyphene               300 ng/ml                  Buprenorphine               10 ng/ml                                    The normal value for all drugs tested is negative. This report includes unconfirmed screening results, with the cutoff values listed, to be used for medical treatment purposes only.  Unconfirmed results must not be used for non-medical purposes such as employment or legal testing.  Clinical consideration should be applied to any drug of abuse test, particularly when unconfirmed results are used.    URINALYSIS W/ CULTURE IF INDICATED - Normal         Narrative: In absence of clinical symptoms, the presence of pyuria, bacteria, and/or nitrites on the urinalysis result does not correlate with infection.                  Urine microscopic not indicated.  PROCALCITONIN - Normal         Narrative: As a Marker for Sepsis (Non-Neonates):                                    1. <0.5 ng/mL represents a low risk of severe sepsis and/or septic shock.                  2. >2 ng/mL represents a high risk of severe sepsis and/or septic shock.    "                                 As a Marker for Lower Respiratory Tract Infections that require antibiotic therapy:                                    PCT on Admission    Antibiotic Therapy       6-12 Hrs later                                    >0.5                Strongly Recommended                  >0.25 - <0.5        Recommended                   0.1 - 0.25          Discouraged              Remeasure/reassess PCT                  <0.1                Strongly Discouraged     Remeasure/reassess PCT                                    As 28 day mortality risk marker: \"Change in Procalcitonin Result\" (>80% or <=80%) if Day 0 (or Day 1) and Day 4 values are available. Refer to http://www.[employer] [email or web address]                                    Change in PCT <=80%                  A decrease of PCT levels below or equal to 80% defines a positive change in PCT test result representing a higher risk for 28-day all-cause mortality of patients diagnosed with severe sepsis for septic shock.                                    Change in PCT >80%                  A decrease of PCT levels of more than 80% defines a negative change in PCT result representing a lower risk for 28-day all-cause mortality of patients diagnosed with severe sepsis or septic shock.                     BNP (IN-HOUSE) - Normal         Narrative: Among patients with dyspnea, NT-proBNP is highly sensitive for the detection of acute congestive heart failure. In addition NT-proBNP of <300 pg/ml effectively rules out acute congestive heart failure with 99% negative predictive value.                    FENTANYL, URINE - Normal         Narrative: Negative Threshold:                                      Fentanyl 5 ng/mL                                     The normal value for the drug tested is negative. This report includes final unconfirmed screening results to be used for medical treatment purposes only. Unconfirmed results must not be used for " non-medical purposes such as employment or legal testing. Clinical consideration should be applied to any drug of abuse test, particularly when unconfirmed results are used.         LACTIC ACID, REFLEX  CBC AND DIFFERENTIAL  CT Angiogram Chest   Final Result         No aneurysm, dissection, or pulmonary embolism.         No acute findings in the chest.              This report was finalized on 08/09/2023 13:46 by  Parminder Sanchez DO.     CT Angiogram Abdomen Pelvis   Final Result         No aortic aneurysm or dissection.         No definite acute findings in the abdomen/pelvis.         Mild circumferential thickening of the transverse colon which may be due    to partially collapsed state. Correlate for mild colitis.         Mildly distended bladder.         This report was finalized on 08/09/2023 13:53 by  Parminder Sanchez DO.     XR Chest 1 View   Final Result    No active disease is seen.              This report was finalized on 08/09/2023 12:36 by Dr. Adi Christine MD.     She has slight leukocytosis with a elevated white count of 19.  Patient is currently on steroids for exacerbation of her psoriasis.  Patient had Dilaudid and Zofran for pain and needed to repeat dosage of Dilaudid for pain management as well.  I reviewed patient patient care plan with Dr. Luke also assessed patient in agreement rash care plan.  Feels that the patient is having more of a muscle spasm most likely related to her back.  Patient states that her pain is much better at this time.  She is comfortable going home with muscle relaxers and Dr. Luke also advised was sent home with pain medication as well.  She was sent home with Norco and Flexeril.  Advised to follow-up with her primary care doctor this week for follow-up and may be further imaging of her lumbar spine.  Patient stated neurovascularly intact and moves all extremities well throughout her stay in emergency department.  Her vitals are stable.  Blood pressure is 145/94,  heart rate 61, respirations 24, O2 sat 99% on room air.  She was discharged in stable condition.  Advised to follow-up with primary care doctor this week.  Advised reasons to return the emergency department.  Differential dx: aortic dissection; pulmonary embolus; acs; pneumonia; diverticulitis; colitis;     Problems Addressed:  Generalized abdominal pain: complicated acute illness or injury  Muscle spasm: complicated acute illness or injury    Amount and/or Complexity of Data Reviewed  Labs: ordered. Decision-making details documented in ED Course.  Radiology: ordered. Decision-making details documented in ED Course.  ECG/medicine tests: ordered. Decision-making details documented in ED Course.    Risk  Prescription drug management.         Final diagnoses:   Muscle spasm   Generalized abdominal pain          Lauren Chapman, APRN  08/09/23 1922

## 2023-08-16 ENCOUNTER — HOSPITAL ENCOUNTER (OUTPATIENT)
Facility: HOSPITAL | Age: 58
Setting detail: OBSERVATION
Discharge: HOME | End: 2023-08-21
Attending: INTERNAL MEDICINE | Admitting: FAMILY MEDICINE
Payer: COMMERCIAL

## 2023-08-16 ENCOUNTER — APPOINTMENT (OUTPATIENT)
Dept: CT IMAGING | Facility: HOSPITAL | Age: 58
End: 2023-08-16
Payer: COMMERCIAL

## 2023-08-16 DIAGNOSIS — R10.9 ABDOMINAL PAIN, UNSPECIFIED ABDOMINAL LOCATION: ICD-10-CM

## 2023-08-16 DIAGNOSIS — I10 HYPERTENSION, UNSPECIFIED TYPE: Primary | ICD-10-CM

## 2023-08-16 DIAGNOSIS — I71.02: ICD-10-CM

## 2023-08-16 DIAGNOSIS — M54.50 ACUTE LOW BACK PAIN, UNSPECIFIED BACK PAIN LATERALITY, UNSPECIFIED WHETHER SCIATICA PRESENT: ICD-10-CM

## 2023-08-16 PROBLEM — I16.0 HYPERTENSIVE URGENCY: Status: ACTIVE | Noted: 2023-08-16

## 2023-08-16 PROBLEM — M54.9 BACK PAIN: Status: ACTIVE | Noted: 2023-08-16

## 2023-08-16 PROBLEM — K59.00 CONSTIPATION: Status: ACTIVE | Noted: 2023-08-16

## 2023-08-16 PROBLEM — R93.5 ABNORMAL CT OF THE ABDOMEN: Status: ACTIVE | Noted: 2023-08-16

## 2023-08-16 PROBLEM — Z72.0 TOBACCO USE: Status: ACTIVE | Noted: 2023-08-16

## 2023-08-16 LAB
ALBUMIN SERPL-MCNC: 4.1 G/DL (ref 3.5–5.2)
ALBUMIN/GLOB SERPL: 1.1 G/DL
ALP SERPL-CCNC: 75 U/L (ref 39–117)
ALT SERPL W P-5'-P-CCNC: 27 U/L (ref 1–33)
ANION GAP SERPL CALCULATED.3IONS-SCNC: 14 MMOL/L (ref 5–15)
APTT PPP: 27 SECONDS (ref 24.1–35)
AST SERPL-CCNC: 25 U/L (ref 1–32)
BACTERIA UR QL AUTO: ABNORMAL /HPF
BASOPHILS # BLD AUTO: 0.07 10*3/MM3 (ref 0–0.2)
BASOPHILS NFR BLD AUTO: 0.3 % (ref 0–1.5)
BILIRUB SERPL-MCNC: 1.5 MG/DL (ref 0–1.2)
BILIRUB UR QL STRIP: NEGATIVE
BUN SERPL-MCNC: 20 MG/DL (ref 6–20)
BUN/CREAT SERPL: 26 (ref 7–25)
CALCIUM SPEC-SCNC: 10.5 MG/DL (ref 8.6–10.5)
CHLORIDE SERPL-SCNC: 92 MMOL/L (ref 98–107)
CLARITY UR: ABNORMAL
CO2 SERPL-SCNC: 24 MMOL/L (ref 22–29)
COLOR UR: YELLOW
CREAT SERPL-MCNC: 0.77 MG/DL (ref 0.57–1)
D-LACTATE SERPL-SCNC: 1.5 MMOL/L (ref 0.5–2)
D-LACTATE SERPL-SCNC: 2.4 MMOL/L (ref 0.5–2)
DEPRECATED RDW RBC AUTO: 43.2 FL (ref 37–54)
EGFRCR SERPLBLD CKD-EPI 2021: 90.1 ML/MIN/1.73
EOSINOPHIL # BLD AUTO: 0.01 10*3/MM3 (ref 0–0.4)
EOSINOPHIL NFR BLD AUTO: 0 % (ref 0.3–6.2)
ERYTHROCYTE [DISTWIDTH] IN BLOOD BY AUTOMATED COUNT: 14.2 % (ref 12.3–15.4)
GLOBULIN UR ELPH-MCNC: 3.9 GM/DL
GLUCOSE SERPL-MCNC: 127 MG/DL (ref 65–99)
GLUCOSE UR STRIP-MCNC: ABNORMAL MG/DL
HCT VFR BLD AUTO: 43.5 % (ref 34–46.6)
HGB BLD-MCNC: 15.2 G/DL (ref 12–15.9)
HGB UR QL STRIP.AUTO: ABNORMAL
HYALINE CASTS UR QL AUTO: ABNORMAL /LPF
IMM GRANULOCYTES # BLD AUTO: 0.32 10*3/MM3 (ref 0–0.05)
IMM GRANULOCYTES NFR BLD AUTO: 1.2 % (ref 0–0.5)
INR PPP: 0.96 (ref 0.91–1.09)
KETONES UR QL STRIP: NEGATIVE
LEUKOCYTE ESTERASE UR QL STRIP.AUTO: NEGATIVE
LIPASE SERPL-CCNC: 16 U/L (ref 13–60)
LYMPHOCYTES # BLD AUTO: 2.05 10*3/MM3 (ref 0.7–3.1)
LYMPHOCYTES NFR BLD AUTO: 7.5 % (ref 19.6–45.3)
MAGNESIUM SERPL-MCNC: 2 MG/DL (ref 1.6–2.6)
MCH RBC QN AUTO: 30.2 PG (ref 26.6–33)
MCHC RBC AUTO-ENTMCNC: 34.9 G/DL (ref 31.5–35.7)
MCV RBC AUTO: 86.5 FL (ref 79–97)
MONOCYTES # BLD AUTO: 1.4 10*3/MM3 (ref 0.1–0.9)
MONOCYTES NFR BLD AUTO: 5.2 % (ref 5–12)
NEUTROPHILS NFR BLD AUTO: 23.32 10*3/MM3 (ref 1.7–7)
NEUTROPHILS NFR BLD AUTO: 85.8 % (ref 42.7–76)
NITRITE UR QL STRIP: NEGATIVE
NRBC BLD AUTO-RTO: 0 /100 WBC (ref 0–0.2)
PH UR STRIP.AUTO: 7.5 [PH] (ref 5–8)
PLATELET # BLD AUTO: 233 10*3/MM3 (ref 140–450)
PMV BLD AUTO: 11.3 FL (ref 6–12)
POTASSIUM SERPL-SCNC: 3.8 MMOL/L (ref 3.5–5.2)
PROT SERPL-MCNC: 8 G/DL (ref 6–8.5)
PROT UR QL STRIP: ABNORMAL
PROTHROMBIN TIME: 12.9 SECONDS (ref 11.8–14.8)
QT INTERVAL: 390 MS
QTC INTERVAL: 435 MS
RBC # BLD AUTO: 5.03 10*6/MM3 (ref 3.77–5.28)
RBC # UR STRIP: ABNORMAL /HPF
REF LAB TEST METHOD: ABNORMAL
SODIUM SERPL-SCNC: 130 MMOL/L (ref 136–145)
SP GR UR STRIP: 1.02 (ref 1–1.03)
SQUAMOUS #/AREA URNS HPF: ABNORMAL /HPF
UROBILINOGEN UR QL STRIP: ABNORMAL
WBC # UR STRIP: ABNORMAL /HPF
WBC NRBC COR # BLD: 27.17 10*3/MM3 (ref 3.4–10.8)

## 2023-08-16 PROCEDURE — 80053 COMPREHEN METABOLIC PANEL: CPT

## 2023-08-16 PROCEDURE — G0378 HOSPITAL OBSERVATION PER HR: HCPCS

## 2023-08-16 PROCEDURE — 96372 THER/PROPH/DIAG INJ SC/IM: CPT

## 2023-08-16 PROCEDURE — 25010000002 HYDRALAZINE PER 20 MG

## 2023-08-16 PROCEDURE — P9612 CATHETERIZE FOR URINE SPEC: HCPCS

## 2023-08-16 PROCEDURE — 25010000002 LABETALOL 5 MG/ML SOLUTION

## 2023-08-16 PROCEDURE — 0 HYDROMORPHONE 1 MG/ML SOLUTION: Performed by: EMERGENCY MEDICINE

## 2023-08-16 PROCEDURE — 72131 CT LUMBAR SPINE W/O DYE: CPT

## 2023-08-16 PROCEDURE — 99285 EMERGENCY DEPT VISIT HI MDM: CPT

## 2023-08-16 PROCEDURE — 25510000001 IOPAMIDOL PER 1 ML

## 2023-08-16 PROCEDURE — 25010000002 ENOXAPARIN PER 10 MG: Performed by: INTERNAL MEDICINE

## 2023-08-16 PROCEDURE — 71275 CT ANGIOGRAPHY CHEST: CPT

## 2023-08-16 PROCEDURE — 25510000001 IOPAMIDOL 61 % SOLUTION

## 2023-08-16 PROCEDURE — 96376 TX/PRO/DX INJ SAME DRUG ADON: CPT

## 2023-08-16 PROCEDURE — 74177 CT ABD & PELVIS W/CONTRAST: CPT

## 2023-08-16 PROCEDURE — 85610 PROTHROMBIN TIME: CPT

## 2023-08-16 PROCEDURE — 83690 ASSAY OF LIPASE: CPT

## 2023-08-16 PROCEDURE — 96365 THER/PROPH/DIAG IV INF INIT: CPT

## 2023-08-16 PROCEDURE — 36415 COLL VENOUS BLD VENIPUNCTURE: CPT

## 2023-08-16 PROCEDURE — 81001 URINALYSIS AUTO W/SCOPE: CPT

## 2023-08-16 PROCEDURE — 83605 ASSAY OF LACTIC ACID: CPT

## 2023-08-16 PROCEDURE — 85730 THROMBOPLASTIN TIME PARTIAL: CPT

## 2023-08-16 PROCEDURE — 93005 ELECTROCARDIOGRAM TRACING: CPT

## 2023-08-16 PROCEDURE — 0 HYDROMORPHONE 1 MG/ML SOLUTION: Performed by: FAMILY MEDICINE

## 2023-08-16 PROCEDURE — 25010000002 ONDANSETRON PER 1 MG: Performed by: EMERGENCY MEDICINE

## 2023-08-16 PROCEDURE — 83735 ASSAY OF MAGNESIUM: CPT

## 2023-08-16 PROCEDURE — 96375 TX/PRO/DX INJ NEW DRUG ADDON: CPT

## 2023-08-16 PROCEDURE — 85025 COMPLETE CBC W/AUTO DIFF WBC: CPT

## 2023-08-16 RX ORDER — POLYETHYLENE GLYCOL 3350 17 G/17G
17 POWDER, FOR SOLUTION ORAL DAILY PRN
Status: DISCONTINUED | OUTPATIENT
Start: 2023-08-16 | End: 2023-08-16

## 2023-08-16 RX ORDER — BISACODYL 5 MG/1
5 TABLET, DELAYED RELEASE ORAL DAILY PRN
Status: DISCONTINUED | OUTPATIENT
Start: 2023-08-16 | End: 2023-08-17

## 2023-08-16 RX ORDER — ACETAMINOPHEN 325 MG/1
650 TABLET ORAL EVERY 4 HOURS PRN
Status: DISCONTINUED | OUTPATIENT
Start: 2023-08-16 | End: 2023-08-21 | Stop reason: HOSPADM

## 2023-08-16 RX ORDER — SODIUM CHLORIDE 9 MG/ML
40 INJECTION, SOLUTION INTRAVENOUS AS NEEDED
Status: DISCONTINUED | OUTPATIENT
Start: 2023-08-16 | End: 2023-08-21 | Stop reason: HOSPADM

## 2023-08-16 RX ORDER — LABETALOL HYDROCHLORIDE 5 MG/ML
20 INJECTION, SOLUTION INTRAVENOUS ONCE
Status: COMPLETED | OUTPATIENT
Start: 2023-08-16 | End: 2023-08-16

## 2023-08-16 RX ORDER — SODIUM CHLORIDE 0.9 % (FLUSH) 0.9 %
10 SYRINGE (ML) INJECTION AS NEEDED
Status: DISCONTINUED | OUTPATIENT
Start: 2023-08-16 | End: 2023-08-17 | Stop reason: SDUPTHER

## 2023-08-16 RX ORDER — BISACODYL 10 MG
10 SUPPOSITORY, RECTAL RECTAL DAILY PRN
Status: DISCONTINUED | OUTPATIENT
Start: 2023-08-16 | End: 2023-08-16

## 2023-08-16 RX ORDER — POLYETHYLENE GLYCOL 3350 17 G/17G
17 POWDER, FOR SOLUTION ORAL DAILY
Status: DISCONTINUED | OUTPATIENT
Start: 2023-08-16 | End: 2023-08-17

## 2023-08-16 RX ORDER — SODIUM CHLORIDE 0.9 % (FLUSH) 0.9 %
10 SYRINGE (ML) INJECTION EVERY 12 HOURS SCHEDULED
Status: DISCONTINUED | OUTPATIENT
Start: 2023-08-16 | End: 2023-08-21 | Stop reason: HOSPADM

## 2023-08-16 RX ORDER — AMOXICILLIN 250 MG
2 CAPSULE ORAL 2 TIMES DAILY
Status: DISCONTINUED | OUTPATIENT
Start: 2023-08-16 | End: 2023-08-17

## 2023-08-16 RX ORDER — ACETAMINOPHEN 500 MG
1000 TABLET ORAL ONCE
Status: COMPLETED | OUTPATIENT
Start: 2023-08-16 | End: 2023-08-16

## 2023-08-16 RX ORDER — LABETALOL HYDROCHLORIDE 5 MG/ML
10 INJECTION, SOLUTION INTRAVENOUS ONCE
Status: COMPLETED | OUTPATIENT
Start: 2023-08-16 | End: 2023-08-16

## 2023-08-16 RX ORDER — ONDANSETRON 2 MG/ML
4 INJECTION INTRAMUSCULAR; INTRAVENOUS ONCE
Status: COMPLETED | OUTPATIENT
Start: 2023-08-16 | End: 2023-08-16

## 2023-08-16 RX ORDER — SODIUM CHLORIDE 9 MG/ML
75 INJECTION, SOLUTION INTRAVENOUS CONTINUOUS
Status: DISCONTINUED | OUTPATIENT
Start: 2023-08-16 | End: 2023-08-21 | Stop reason: HOSPADM

## 2023-08-16 RX ORDER — ALBUTEROL SULFATE 2.5 MG/3ML
2.5 SOLUTION RESPIRATORY (INHALATION) EVERY 6 HOURS PRN
Status: DISCONTINUED | OUTPATIENT
Start: 2023-08-16 | End: 2023-08-21 | Stop reason: HOSPADM

## 2023-08-16 RX ORDER — LABETALOL HYDROCHLORIDE 5 MG/ML
10 INJECTION, SOLUTION INTRAVENOUS EVERY 6 HOURS PRN
Status: DISCONTINUED | OUTPATIENT
Start: 2023-08-16 | End: 2023-08-21 | Stop reason: HOSPADM

## 2023-08-16 RX ORDER — ENOXAPARIN SODIUM 100 MG/ML
40 INJECTION SUBCUTANEOUS
Status: DISCONTINUED | OUTPATIENT
Start: 2023-08-16 | End: 2023-08-21 | Stop reason: HOSPADM

## 2023-08-16 RX ORDER — AMOXICILLIN 250 MG
2 CAPSULE ORAL 2 TIMES DAILY
Status: DISCONTINUED | OUTPATIENT
Start: 2023-08-16 | End: 2023-08-16

## 2023-08-16 RX ORDER — HYDRALAZINE HYDROCHLORIDE 20 MG/ML
20 INJECTION INTRAMUSCULAR; INTRAVENOUS ONCE
Status: COMPLETED | OUTPATIENT
Start: 2023-08-16 | End: 2023-08-16

## 2023-08-16 RX ORDER — SODIUM CHLORIDE 0.9 % (FLUSH) 0.9 %
10 SYRINGE (ML) INJECTION AS NEEDED
Status: DISCONTINUED | OUTPATIENT
Start: 2023-08-16 | End: 2023-08-21 | Stop reason: HOSPADM

## 2023-08-16 RX ORDER — AMLODIPINE BESYLATE 5 MG/1
2.5 TABLET ORAL
Status: DISCONTINUED | OUTPATIENT
Start: 2023-08-17 | End: 2023-08-17

## 2023-08-16 RX ORDER — BISACODYL 10 MG
10 SUPPOSITORY, RECTAL RECTAL DAILY PRN
Status: DISCONTINUED | OUTPATIENT
Start: 2023-08-16 | End: 2023-08-17

## 2023-08-16 RX ORDER — DIPHENHYDRAMINE HYDROCHLORIDE 50 MG/ML
25 INJECTION INTRAMUSCULAR; INTRAVENOUS EVERY 6 HOURS PRN
Status: DISCONTINUED | OUTPATIENT
Start: 2023-08-16 | End: 2023-08-21 | Stop reason: HOSPADM

## 2023-08-16 RX ORDER — HYDROCODONE BITARTRATE AND ACETAMINOPHEN 5; 325 MG/1; MG/1
1 TABLET ORAL EVERY 6 HOURS PRN
Status: DISCONTINUED | OUTPATIENT
Start: 2023-08-16 | End: 2023-08-21 | Stop reason: HOSPADM

## 2023-08-16 RX ORDER — BISACODYL 5 MG/1
5 TABLET, DELAYED RELEASE ORAL DAILY PRN
Status: DISCONTINUED | OUTPATIENT
Start: 2023-08-16 | End: 2023-08-16

## 2023-08-16 RX ORDER — CYCLOBENZAPRINE HCL 10 MG
10 TABLET ORAL 3 TIMES DAILY PRN
Status: DISCONTINUED | OUTPATIENT
Start: 2023-08-16 | End: 2023-08-21 | Stop reason: HOSPADM

## 2023-08-16 RX ORDER — ONDANSETRON 2 MG/ML
4 INJECTION INTRAMUSCULAR; INTRAVENOUS EVERY 6 HOURS PRN
Status: DISCONTINUED | OUTPATIENT
Start: 2023-08-16 | End: 2023-08-21 | Stop reason: HOSPADM

## 2023-08-16 RX ADMIN — ENOXAPARIN SODIUM 40 MG: 100 INJECTION SUBCUTANEOUS at 23:31

## 2023-08-16 RX ADMIN — IOPAMIDOL 100 ML: 755 INJECTION, SOLUTION INTRAVENOUS at 13:55

## 2023-08-16 RX ADMIN — ACETAMINOPHEN 1000 MG: 500 TABLET, FILM COATED ORAL at 18:54

## 2023-08-16 RX ADMIN — LABETALOL HYDROCHLORIDE 10 MG: 5 INJECTION INTRAVENOUS at 15:41

## 2023-08-16 RX ADMIN — ONDANSETRON 4 MG: 2 INJECTION INTRAMUSCULAR; INTRAVENOUS at 13:27

## 2023-08-16 RX ADMIN — HYDROMORPHONE HYDROCHLORIDE 1 MG: 1 INJECTION, SOLUTION INTRAMUSCULAR; INTRAVENOUS; SUBCUTANEOUS at 19:26

## 2023-08-16 RX ADMIN — SODIUM CHLORIDE 1000 ML: 9 INJECTION, SOLUTION INTRAVENOUS at 14:48

## 2023-08-16 RX ADMIN — SODIUM CHLORIDE 75 ML/HR: 9 INJECTION, SOLUTION INTRAVENOUS at 23:33

## 2023-08-16 RX ADMIN — HYDROMORPHONE HYDROCHLORIDE 1 MG: 1 INJECTION, SOLUTION INTRAMUSCULAR; INTRAVENOUS; SUBCUTANEOUS at 13:28

## 2023-08-16 RX ADMIN — SODIUM CHLORIDE 5 MG/HR: 9 INJECTION, SOLUTION INTRAVENOUS at 16:28

## 2023-08-16 RX ADMIN — HYDRALAZINE HYDROCHLORIDE 20 MG: 20 INJECTION INTRAMUSCULAR; INTRAVENOUS at 18:01

## 2023-08-16 RX ADMIN — IOPAMIDOL 100 ML: 612 INJECTION, SOLUTION INTRAVENOUS at 12:46

## 2023-08-16 RX ADMIN — DOCUSATE SODIUM 50 MG AND SENNOSIDES 8.6 MG 2 TABLET: 8.6; 5 TABLET, FILM COATED ORAL at 23:31

## 2023-08-16 RX ADMIN — Medication 10 ML: at 23:32

## 2023-08-16 RX ADMIN — LABETALOL HYDROCHLORIDE 20 MG: 5 INJECTION INTRAVENOUS at 13:30

## 2023-08-16 RX ADMIN — POLYETHYLENE GLYCOL 3350 17 G: 17 POWDER, FOR SOLUTION ORAL at 23:32

## 2023-08-16 NOTE — ED PROVIDER NOTES
Subjective   History of Present Illness  Patient is a 58-year-old female that presents emergency department for multiple complaints.  Patient reports that she was seen here approximately a week ago on 8/9/2023 for similar symptoms.  She states that she has been having low back pain radiating down into her legs.  She states that she was discharged with muscle spasms and constipation.  Patient reports that she has no known injury to cause back pain.  She denies any new injury since previous ED encounter.  Denies any falls or heavy lifting.  Patient reports that she is a  and is on her feet all day.  Patient reports back pain has not changed only increased in pain and she no longer has pain rating into her legs.  She states the pain is located in the lower back and into her left hip.  She states that she has not had a bowel movement in the last 3 weeks.  She followed up with Dr. Hillman and was diagnosed with muscle spasms there as well.  She states that he prescribed her gabapentin, steroids, and muscle relaxers but she has had no relief in symptoms.  Patient reports that she did have to discontinue taking gabapentin due to adverse reaction of hallucinations.  Patient states that she started having an increase in pain yesterday in the lower back and into her abdomen.  She felt that her abdomen was slightly distended yesterday but she felt she was having gas until she had several episodes of vomiting green to black emesis yesterday.  She denies any episodes of nausea or vomiting and has no abdominal pain today.  She states that her abdomen is tender upon palpation at this time.  Patient denies any fevers, cough, congestion, chest pain, shortness of breath, or diarrhea.  She denies any urinary symptoms such as dysuria, urgency, frequency, retention or hematuria.    Review of Systems   Constitutional:  Positive for activity change.   Gastrointestinal:  Positive for abdominal distention, abdominal pain,  constipation, nausea and vomiting.   Musculoskeletal:  Positive for arthralgias, back pain and myalgias.   All other systems reviewed and are negative.    Past Medical History:   Diagnosis Date    Arthritis     Asthma     COPD (chronic obstructive pulmonary disease)     Kidney stone        Allergies   Allergen Reactions    Codeine Hives    Keflex [Cephalexin] Hives    Ciprofloxacin Rash       Past Surgical History:   Procedure Laterality Date    COLONOSCOPY N/A 04/20/2021    Procedure: COLONOSCOPY WITH ANESTHESIA;  Surgeon: Ti Lea DO;  Location:  [facility];  Service: Gastroenterology;  Laterality: N/A;  pre op: family hx polyps  post op:poor prep  PCP: Chip Hillman DO    COLONOSCOPY N/A 04/21/2021    Procedure: COLONOSCOPY WITH ANESTHESIA;  Surgeon: Ti Lea DO;  Location:  [facility];  Service: Gastroenterology;  Laterality: N/A;  Pre: Family hx Colon Polyps  Post: Colon Polyp  Dr. Chip Hillman  CO2 Inflation Used    HYSTERECTOMY  1996    Complete    OOPHORECTOMY         Family History   Problem Relation Age of Onset    Heart disease Mother     Osteoporosis Mother     Alcohol abuse Father     Cancer Father     Osteoporosis Father     Osteoporosis Sister     Breast cancer Sister     Colon polyps Sister     Cancer Brother     Heart disease Brother     Osteoporosis Brother     Colon polyps Brother     Esophageal cancer Neg Hx        Social History     Socioeconomic History    Marital status:    Tobacco Use    Smoking status: Every Day     Packs/day: 1.00     Years: 30.00     Pack years: 30.00     Types: Cigarettes    Smokeless tobacco: Never   Vaping Use    Vaping Use: Never used   Substance and Sexual Activity    Alcohol use: No    Drug use: No    Sexual activity: Yes     Partners: Male     Birth control/protection: Surgical           Objective   Physical Exam  Vitals and nursing note reviewed.   Constitutional:       Appearance: She is ill-appearing.      Comments: In  no acute distress.    HENT:      Head: Normocephalic and atraumatic.      Right Ear: External ear normal.      Left Ear: External ear normal.      Nose: Nose normal.      Mouth/Throat:      Mouth: Mucous membranes are moist.      Pharynx: Oropharynx is clear.   Eyes:      Extraocular Movements: Extraocular movements intact.      Conjunctiva/sclera: Conjunctivae normal.      Pupils: Pupils are equal, round, and reactive to light.   Cardiovascular:      Rate and Rhythm: Normal rate and regular rhythm.      Pulses: Normal pulses.      Heart sounds: Normal heart sounds.   Pulmonary:      Effort: Pulmonary effort is normal. No respiratory distress.      Breath sounds: Normal breath sounds. No wheezing.   Chest:      Chest wall: No tenderness.   Abdominal:      General: Abdomen is flat. Bowel sounds are normal. There is no distension.      Palpations: Abdomen is soft.      Tenderness: There is generalized abdominal tenderness. There is left CVA tenderness. There is no right CVA tenderness, guarding or rebound.      Comments: Patient does report tenderness upon palpation to the general abdomen but reports worsening pain in the periumbilical area.   Musculoskeletal:         General: Normal range of motion.      Cervical back: Normal, normal range of motion and neck supple.      Thoracic back: Normal.      Lumbar back: Tenderness present. Negative right straight leg raise test and negative left straight leg raise test.      Right hip: Normal. No tenderness or bony tenderness. Normal range of motion.      Left hip: Normal. No tenderness or bony tenderness. Normal range of motion.      Right lower leg: No edema.      Left lower leg: No edema.   Skin:     General: Skin is warm and dry.      Capillary Refill: Capillary refill takes less than 2 seconds.   Neurological:      General: No focal deficit present.      Mental Status: She is alert and oriented to person, place, and time. Mental status is at baseline.      Sensory:  Sensation is intact.      Motor: Motor function is intact.      Coordination: Coordination is intact.      Comments: Patient denies any loss of bowel or bladder.  She denies any numbness or tingling.  She reports full sensation in bilateral lower extremities.  She is able to perform straight leg raises as well as finger-to-nose and heel-to-shin without difficulty.   Psychiatric:         Mood and Affect: Mood normal.         Behavior: Behavior normal.         Thought Content: Thought content normal.         Judgment: Judgment normal.     Labs Reviewed   COMPREHENSIVE METABOLIC PANEL - Abnormal; Notable for the following components:       Result Value    Glucose 127 (*)     Sodium 130 (*)     Chloride 92 (*)     Total Bilirubin 1.5 (*)     BUN/Creatinine Ratio 26.0 (*)     All other components within normal limits    Narrative:     GFR Normal >60  Chronic Kidney Disease <60  Kidney Failure <15     URINALYSIS W/ CULTURE IF INDICATED - Abnormal; Notable for the following components:    Appearance, UA Cloudy (*)     Glucose,  mg/dL (1+) (*)     Blood, UA Moderate (2+) (*)     Protein, UA 30 mg/dL (1+) (*)     All other components within normal limits    Narrative:     In absence of clinical symptoms, the presence of pyuria, bacteria, and/or nitrites on the urinalysis result does not correlate with infection.   LACTIC ACID, PLASMA - Abnormal; Notable for the following components:    Lactate 2.4 (*)     All other components within normal limits   CBC WITH AUTO DIFFERENTIAL - Abnormal; Notable for the following components:    WBC 27.17 (*)     Neutrophil % 85.8 (*)     Lymphocyte % 7.5 (*)     Eosinophil % 0.0 (*)     Immature Grans % 1.2 (*)     Neutrophils, Absolute 23.32 (*)     Monocytes, Absolute 1.40 (*)     Immature Grans, Absolute 0.32 (*)     All other components within normal limits   URINALYSIS, MICROSCOPIC ONLY - Abnormal; Notable for the following components:    RBC, UA 31-50 (*)     WBC, UA 0-2 (*)      All other components within normal limits   CBC WITH AUTO DIFFERENTIAL - Abnormal; Notable for the following components:    WBC 19.54 (*)     Neutrophil % 83.5 (*)     Lymphocyte % 9.8 (*)     Immature Grans % 0.7 (*)     Neutrophils, Absolute 16.32 (*)     Monocytes, Absolute 1.06 (*)     Immature Grans, Absolute 0.14 (*)     All other components within normal limits   COMPREHENSIVE METABOLIC PANEL - Abnormal; Notable for the following components:    Glucose 138 (*)     Sodium 133 (*)     CO2 21.0 (*)     BUN/Creatinine Ratio 29.2 (*)     All other components within normal limits    Narrative:     GFR Normal >60  Chronic Kidney Disease <60  Kidney Failure <15     LIPASE - Normal   MAGNESIUM - Normal   LACTIC ACID, REFLEX - Normal   PROTIME-INR - Normal   APTT - Normal   PHOSPHORUS - Normal   MAGNESIUM - Normal   CBC AND DIFFERENTIAL    Narrative:     The following orders were created for panel order CBC & Differential.  Procedure                               Abnormality         Status                     ---------                               -----------         ------                     CBC Auto Differential[[id number]]        Abnormal            Final result                 Please view results for these tests on the individual orders.      CT Angiogram Chest   Final Result   1. No acute aortic pathology or other acute cardiopulmonary process.   Thoracic aorta is nondilated.   2. Paraseptal emphysema.           This report was finalized on 08/16/2023 14:16 by Dr Brett Reyes, .      CT Abdomen Pelvis With Contrast   Final Result       1.  New eccentric thickening along the anterior abdominal aorta   beginning at the mid abdominal aorta and extending distally into the   RIGHT common iliac artery. This is favored to represent an acute   intramural hematoma. Of note, this finding was not present on a CT   performed recently on 08/09/2023.       2.  Moderate volume stool throughout the colon. Correlate for    constipation.       --- These findings were discussed with SRIDHAR SHARMA on 8/16/2023 1:18   PM CDT by Dr. Chris Us.   This report was finalized on 08/16/2023 13:21 by Dr. Chris Us MD.      CT Lumbar Spine Without Contrast   Final Result   1. Osteopenia with mild left convexity of the degenerative lumbar spine.   There is moderate central canal stenosis at the L4/L5 level with   moderate right L4/L5 and left L5-S1 foraminal narrowing as described   above. No acute lumbar vertebral fracture or traumatic malalignment.   This report was finalized on 08/16/2023 13:00 by Dr. Kylie Toro MD.           Procedures           ED Course  ED Course as of 08/17/23 1032   Wed Aug 16, 2023   1442 Vascular surgeon, Dr. Lundberg paged at this time for consult.  [KF]   4629 Spoke with KYRA Martin with Dr. Lundberg's office.  She states that she will call Dr. Lundberg and discussed CT findings and return call. [KF]   1609 Dr. Lundberg has requested that the patient have a follow-up CT in 1 week for monitoring.  He also highly encourages that the patient's blood pressure be controlled.  Patient has been given 2 different doses of labetalol and consistently has a high blood pressure.  Patient was placed on Cardene. [KF]      ED Course User Index  [KF] Sridhar Sharma APRN                                           Medical Decision Making  Sol Fontenot is a 58 y.o. female who presents to the ED for multiple complaints.  Patient reports that she was seen here approximately a week ago on 8/9/2023 for similar symptoms.  She states that she has been having low back pain radiating down into her legs.  She states that she was discharged with muscle spasms and constipation.  Patient reports that she has no known injury to cause back pain.  She denies any new injury since previous ED encounter.  Denies any falls or heavy lifting.  Patient reports that she is a  and is on her feet all day.  Patient reports back pain has  not changed only increased in pain and she no longer has pain rating into her legs.  She states the pain is located in the lower back and into her left hip.  She states that she has not had a bowel movement in the last 3 weeks.  She followed up with Dr. Hillman and was diagnosed with muscle spasms there as well.  She states that he prescribed her gabapentin, steroids, and muscle relaxers but she has had no relief in symptoms.  Patient reports that she did have to discontinue taking gabapentin due to adverse reaction of hallucinations.  Patient states that she started having an increase in pain yesterday in the lower back and into her abdomen.  She felt that her abdomen was slightly distended yesterday but she felt she was having gas until she had several episodes of vomiting green to black emesis yesterday.  She denies any episodes of nausea or vomiting and has no abdominal pain today.  She states that her abdomen is tender upon palpation at this time.  Patient denies any fevers, cough, congestion, chest pain, shortness of breath, or diarrhea.  She denies any urinary symptoms such as dysuria, urgency, frequency, retention or hematuria.    Patient was ill appearing on arrival. No acute distress was noted. Past medical history, surgical history, and medication regimen reviewed.     Patient was hypertensive upon arrival to the ED and throughout majority of the ED encounter.  No tachycardia noted.  Patient is afebrile.  No evidence of respiratory distress or shortness of breath noted throughout ED encounter.  Patient's oxygen saturation remained 98 and above on room air.    Previous ED encounter, notes, labs, imaging and more reviewed.    Patient's presentation raises suspicion for differentials including, but not limited to, muscle spasm, low back pain with sciatica, aortic dissection, bowel obstruction, bowel abrasion, urinary tract infection, pyelonephritis.    Given this, Sol was placed on the monitor and IV access  was obtained as needed. Laboratory studies and imaging studies were ordered.     Given findings described above, patient's presentation is likely consistent with hypertensive urgency and acute intramural hematoma favorable along the anterior abdominal aorta beginning at the mid abdominal aorta and extending distally into the right common iliac artery.      Vascular surgeon, Dr. Lundberg was consulted regarding patient presentation, labs and CT results.  I spoke with KYRA Martin who shared CT results with Dr. Lundberg.  Dr. Lundberg did review CT imaging and states that he recommends that patient will need a repeat CT in a week to monitor hematoma.  States that if patient's pain and blood pressure is able to be controlled she can be discharged but if unable to control these then patient will need to be admitted.    Throughout ED encounter patient was administered multiple different medications.  She was administered a Dilaudid 1 mg twice, 1 g of Tylenol, Zofran for nausea, 1 L of normal saline.  Patient was hypertensive throughout the encounter and blood pressure was difficult to control.  Originally patient was administered 20 mg of labetalol that did lower the pressure for a very short amount of time.  Patient was then administered 10 mg of labetalol with the same reaction.  Patient was then administered 20 mg of hydralazine.  She was also placed on a Cardene drip at 1 point to control blood pressure.  Patient reports that she has no history of hypertension and takes no medications other than Humira and multivitamins.      Dr. May, hospitalist was consulted regarding admission to the hospital due to inability to control hypertension as well as pain.  He did accept patient for admission to the hospital.    I reassessed the patient and discussed the findings of the work up so far with patient. I said that the next step in treatment would be admission to the hospital for further workup and care. I also said that there  is always some diagnostic uncertainty in the ER, that symptoms may change, and new things may be found after being admitted. Dr. May, hospitalist assumed primary care of the patient and the patient was admitted to their service in stable condition.    Problems Addressed:  Abdominal pain, unspecified abdominal location: complicated acute illness or injury  Acute low back pain, unspecified back pain laterality, unspecified whether sciatica present: complicated acute illness or injury  Hypertension, unspecified type: complicated acute illness or injury    Amount and/or Complexity of Data Reviewed  Labs: ordered.  Radiology: ordered.    Risk  OTC drugs.  Prescription drug management.  Decision regarding hospitalization.        Final diagnoses:   Hypertension, unspecified type   Abdominal pain, unspecified abdominal location   Acute low back pain, unspecified back pain laterality, unspecified whether sciatica present       ED Disposition  ED Disposition       ED Disposition   Decision to Admit    Condition   --    Comment   Level of Care: Remote Telemetry [26]   Diagnosis: Hypertensive urgency [136246]   Admitting Physician: JOSEFINA MAY [[id number]]   Attending Physician: JOSEFINA MAY [[id number]]                 No follow-up provider specified.       Medication List      No changes were made to your prescriptions during this visit.            Ezio Lincoln, APRN  08/17/23 1032

## 2023-08-17 LAB
ALBUMIN SERPL-MCNC: 4 G/DL (ref 3.5–5.2)
ALBUMIN/GLOB SERPL: 1.3 G/DL
ALP SERPL-CCNC: 64 U/L (ref 39–117)
ALT SERPL W P-5'-P-CCNC: 21 U/L (ref 1–33)
ANION GAP SERPL CALCULATED.3IONS-SCNC: 14 MMOL/L (ref 5–15)
AST SERPL-CCNC: 18 U/L (ref 1–32)
BASOPHILS # BLD AUTO: 0.03 10*3/MM3 (ref 0–0.2)
BASOPHILS NFR BLD AUTO: 0.2 % (ref 0–1.5)
BILIRUB SERPL-MCNC: 0.9 MG/DL (ref 0–1.2)
BUN SERPL-MCNC: 19 MG/DL (ref 6–20)
BUN/CREAT SERPL: 29.2 (ref 7–25)
CALCIUM SPEC-SCNC: 9.5 MG/DL (ref 8.6–10.5)
CHLORIDE SERPL-SCNC: 98 MMOL/L (ref 98–107)
CO2 SERPL-SCNC: 21 MMOL/L (ref 22–29)
CREAT SERPL-MCNC: 0.65 MG/DL (ref 0.57–1)
DEPRECATED RDW RBC AUTO: 44.1 FL (ref 37–54)
EGFRCR SERPLBLD CKD-EPI 2021: 102.8 ML/MIN/1.73
EOSINOPHIL # BLD AUTO: 0.07 10*3/MM3 (ref 0–0.4)
EOSINOPHIL NFR BLD AUTO: 0.4 % (ref 0.3–6.2)
ERYTHROCYTE [DISTWIDTH] IN BLOOD BY AUTOMATED COUNT: 14.9 % (ref 12.3–15.4)
GLOBULIN UR ELPH-MCNC: 3.1 GM/DL
GLUCOSE SERPL-MCNC: 138 MG/DL (ref 65–99)
HCT VFR BLD AUTO: 41.4 % (ref 34–46.6)
HGB BLD-MCNC: 14.1 G/DL (ref 12–15.9)
IMM GRANULOCYTES # BLD AUTO: 0.14 10*3/MM3 (ref 0–0.05)
IMM GRANULOCYTES NFR BLD AUTO: 0.7 % (ref 0–0.5)
LYMPHOCYTES # BLD AUTO: 1.92 10*3/MM3 (ref 0.7–3.1)
LYMPHOCYTES NFR BLD AUTO: 9.8 % (ref 19.6–45.3)
MAGNESIUM SERPL-MCNC: 2.2 MG/DL (ref 1.6–2.6)
MCH RBC QN AUTO: 29.5 PG (ref 26.6–33)
MCHC RBC AUTO-ENTMCNC: 34.1 G/DL (ref 31.5–35.7)
MCV RBC AUTO: 86.6 FL (ref 79–97)
MONOCYTES # BLD AUTO: 1.06 10*3/MM3 (ref 0.1–0.9)
MONOCYTES NFR BLD AUTO: 5.4 % (ref 5–12)
NEUTROPHILS NFR BLD AUTO: 16.32 10*3/MM3 (ref 1.7–7)
NEUTROPHILS NFR BLD AUTO: 83.5 % (ref 42.7–76)
NRBC BLD AUTO-RTO: 0 /100 WBC (ref 0–0.2)
PHOSPHATE SERPL-MCNC: 3 MG/DL (ref 2.5–4.5)
PLATELET # BLD AUTO: 211 10*3/MM3 (ref 140–450)
PMV BLD AUTO: 11.1 FL (ref 6–12)
POTASSIUM SERPL-SCNC: 3.9 MMOL/L (ref 3.5–5.2)
PROT SERPL-MCNC: 7.1 G/DL (ref 6–8.5)
RBC # BLD AUTO: 4.78 10*6/MM3 (ref 3.77–5.28)
SODIUM SERPL-SCNC: 133 MMOL/L (ref 136–145)
WBC NRBC COR # BLD: 19.54 10*3/MM3 (ref 3.4–10.8)

## 2023-08-17 PROCEDURE — 96376 TX/PRO/DX INJ SAME DRUG ADON: CPT

## 2023-08-17 PROCEDURE — 96375 TX/PRO/DX INJ NEW DRUG ADDON: CPT

## 2023-08-17 PROCEDURE — 25010000002 DIPHENHYDRAMINE PER 50 MG: Performed by: INTERNAL MEDICINE

## 2023-08-17 PROCEDURE — 36415 COLL VENOUS BLD VENIPUNCTURE: CPT | Performed by: INTERNAL MEDICINE

## 2023-08-17 PROCEDURE — 25010000002 ENOXAPARIN PER 10 MG: Performed by: INTERNAL MEDICINE

## 2023-08-17 PROCEDURE — 96372 THER/PROPH/DIAG INJ SC/IM: CPT

## 2023-08-17 PROCEDURE — 85025 COMPLETE CBC W/AUTO DIFF WBC: CPT | Performed by: INTERNAL MEDICINE

## 2023-08-17 PROCEDURE — 83735 ASSAY OF MAGNESIUM: CPT | Performed by: INTERNAL MEDICINE

## 2023-08-17 PROCEDURE — 0 HYDROMORPHONE 1 MG/ML SOLUTION: Performed by: FAMILY MEDICINE

## 2023-08-17 PROCEDURE — 80053 COMPREHEN METABOLIC PANEL: CPT | Performed by: INTERNAL MEDICINE

## 2023-08-17 PROCEDURE — 25010000002 LABETALOL 5 MG/ML SOLUTION: Performed by: INTERNAL MEDICINE

## 2023-08-17 PROCEDURE — 84100 ASSAY OF PHOSPHORUS: CPT | Performed by: INTERNAL MEDICINE

## 2023-08-17 PROCEDURE — G0378 HOSPITAL OBSERVATION PER HR: HCPCS

## 2023-08-17 RX ORDER — AMOXICILLIN 250 MG
2 CAPSULE ORAL NIGHTLY
Status: DISCONTINUED | OUTPATIENT
Start: 2023-08-17 | End: 2023-08-21 | Stop reason: HOSPADM

## 2023-08-17 RX ORDER — POLYETHYLENE GLYCOL 3350 17 G/17G
17 POWDER, FOR SOLUTION ORAL DAILY
Status: DISCONTINUED | OUTPATIENT
Start: 2023-08-17 | End: 2023-08-21 | Stop reason: HOSPADM

## 2023-08-17 RX ORDER — AMLODIPINE BESYLATE 5 MG/1
5 TABLET ORAL
Status: DISCONTINUED | OUTPATIENT
Start: 2023-08-18 | End: 2023-08-18

## 2023-08-17 RX ORDER — KETOCONAZOLE 20 MG/G
1 CREAM TOPICAL 2 TIMES DAILY
COMMUNITY

## 2023-08-17 RX ORDER — AMLODIPINE BESYLATE 5 MG/1
2.5 TABLET ORAL ONCE
Status: COMPLETED | OUTPATIENT
Start: 2023-08-17 | End: 2023-08-17

## 2023-08-17 RX ORDER — DIAPER,BRIEF,INFANT-TODD,DISP
1 EACH MISCELLANEOUS EVERY 12 HOURS SCHEDULED
Status: DISCONTINUED | OUTPATIENT
Start: 2023-08-17 | End: 2023-08-21 | Stop reason: HOSPADM

## 2023-08-17 RX ORDER — B-COMPLEX WITH VITAMIN C
1 TABLET ORAL DAILY
COMMUNITY

## 2023-08-17 RX ORDER — LISINOPRIL 10 MG/1
10 TABLET ORAL
Status: DISCONTINUED | OUTPATIENT
Start: 2023-08-17 | End: 2023-08-21

## 2023-08-17 RX ORDER — DEXAMETHASONE 4 MG/1
4 TABLET ORAL TAKE AS DIRECTED
COMMUNITY
End: 2023-08-21 | Stop reason: HOSPADM

## 2023-08-17 RX ORDER — BISACODYL 10 MG
10 SUPPOSITORY, RECTAL RECTAL DAILY
Status: DISCONTINUED | OUTPATIENT
Start: 2023-08-17 | End: 2023-08-19

## 2023-08-17 RX ADMIN — DIPHENHYDRAMINE HYDROCHLORIDE 25 MG: 50 INJECTION, SOLUTION INTRAMUSCULAR; INTRAVENOUS at 01:12

## 2023-08-17 RX ADMIN — ACETAMINOPHEN 650 MG: 325 TABLET, FILM COATED ORAL at 18:05

## 2023-08-17 RX ADMIN — HYDROCORTISONE 1 APPLICATION: 1 CREAM TOPICAL at 22:06

## 2023-08-17 RX ADMIN — HYDROMORPHONE HYDROCHLORIDE 1 MG: 1 INJECTION, SOLUTION INTRAMUSCULAR; INTRAVENOUS; SUBCUTANEOUS at 13:04

## 2023-08-17 RX ADMIN — CYCLOBENZAPRINE 10 MG: 10 TABLET, FILM COATED ORAL at 02:19

## 2023-08-17 RX ADMIN — HYDROCODONE BITARTRATE AND ACETAMINOPHEN 1 TABLET: 5; 325 TABLET ORAL at 06:22

## 2023-08-17 RX ADMIN — HYDROCODONE BITARTRATE AND ACETAMINOPHEN 1 TABLET: 5; 325 TABLET ORAL at 00:40

## 2023-08-17 RX ADMIN — ENOXAPARIN SODIUM 40 MG: 100 INJECTION SUBCUTANEOUS at 22:05

## 2023-08-17 RX ADMIN — DOCUSATE SODIUM 50 MG AND SENNOSIDES 8.6 MG 2 TABLET: 8.6; 5 TABLET, FILM COATED ORAL at 22:06

## 2023-08-17 RX ADMIN — HYDROCORTISONE 1 APPLICATION: 1 CREAM TOPICAL at 11:35

## 2023-08-17 RX ADMIN — HYDROCODONE BITARTRATE AND ACETAMINOPHEN 1 TABLET: 5; 325 TABLET ORAL at 12:43

## 2023-08-17 RX ADMIN — CYCLOBENZAPRINE 10 MG: 10 TABLET, FILM COATED ORAL at 21:20

## 2023-08-17 RX ADMIN — BISACODYL 10 MG: 10 SUPPOSITORY RECTAL at 10:26

## 2023-08-17 RX ADMIN — Medication 10 ML: at 08:53

## 2023-08-17 RX ADMIN — CYCLOBENZAPRINE 10 MG: 10 TABLET, FILM COATED ORAL at 10:36

## 2023-08-17 RX ADMIN — LISINOPRIL 10 MG: 10 TABLET ORAL at 13:46

## 2023-08-17 RX ADMIN — LABETALOL HYDROCHLORIDE 10 MG: 5 INJECTION INTRAVENOUS at 11:32

## 2023-08-17 RX ADMIN — HYDROCODONE BITARTRATE AND ACETAMINOPHEN 1 TABLET: 5; 325 TABLET ORAL at 21:20

## 2023-08-17 RX ADMIN — NALOXEGOL OXALATE 25 MG: 25 TABLET, FILM COATED ORAL at 15:16

## 2023-08-17 RX ADMIN — DOCUSATE SODIUM 50 MG AND SENNOSIDES 8.6 MG 2 TABLET: 8.6; 5 TABLET, FILM COATED ORAL at 08:52

## 2023-08-17 RX ADMIN — ACETAMINOPHEN 650 MG: 325 TABLET, FILM COATED ORAL at 03:46

## 2023-08-17 RX ADMIN — POLYETHYLENE GLYCOL 3350 17 G: 17 POWDER, FOR SOLUTION ORAL at 10:31

## 2023-08-17 RX ADMIN — AMLODIPINE BESYLATE 2.5 MG: 5 TABLET ORAL at 11:32

## 2023-08-17 RX ADMIN — AMLODIPINE BESYLATE 2.5 MG: 5 TABLET ORAL at 08:45

## 2023-08-17 RX ADMIN — SODIUM CHLORIDE 75 ML/HR: 9 INJECTION, SOLUTION INTRAVENOUS at 22:07

## 2023-08-17 NOTE — H&P
[facility] Medicine Services  HISTORY AND PHYSICAL    Date of Admission: 8/16/2023  Primary Care Physician: Chip Hillman DO Subjective   Primary Historian: patient    Chief Complaint: back pain, vomiting    History of Present Illness  Patient seen in ER 41.  She is a 58-year-old female follows with Dr. Hillman for primary care.  Does not really have much significant medical history.  Tobacco use and psoriasis.  She states she only takes vitamins on a regular basis and no other medications.  About a week and a half ago patient started having back pain and discomfort in her low back shooting down into her right thigh.  Sciatica-like symptoms and she came to the ER was evaluated and sent home with outpatient follow-up.  Pain continued she saw her primary earlier in the week and was started on some Norco and Flexeril.  She is now gone uncomfortable in her abdomen with some ongoing back discomfort but states the radiation to her leg is no longer there.  Last night she started having episodes of bilious emesis.  No blood.  Episodes continued into this morning with 2 more events of nonbloody emesis.  She has not been able to eat or drink much the last 1 to 1-1/2 days.  Due to her progression of symptoms she came back to the ER.  On arrival she was significantly hypertensive with a blood pressure up to 192 systolic and with diastolics frequently in the 110s.  She was given medicines for pain and IV blood pressure medications.  Her BP continued to be elevated and eventually she was started on Cardene although very briefly for about half an hour and her pressure dropped into the 120s.  Since stopping the drip though she received another dose of pain medication and IV blood pressure medicine.  She was around 140 systolic when I saw her.  We been asked to admit monitor overnight.  Additional information shows that she had a CTA of her chest which was nonacute without any thoracic  aortic issues.  CT rad and pelvis was done with contrast alone and did question a potential thickening of her mid abdominal aorta extending to her right common iliac artery concerning for possible intramural hematoma.  This was discussed with Dr. Lundberg by the ER provider as well as myself.  These can cause some pain but doubtful to the degree that she was having.  From their standpoint nothing surgically emergent and work on blood pressure/pain control.  CAT scan also showed significant amount of constipation.  Lumbar spine imaging showed some DJD of L4-L5 and L5-S1.  Again patient appeared stable when I saw her in the ER.  She has she states her standpoint she just wants to have a bowel movement at this point.  It has been several days and she is normally very regular.        Review of Systems   Otherwise complete ROS reviewed and negative except as mentioned in the HPI.    Past Medical History:   Past Medical History:   Diagnosis Date    Arthritis     Asthma     COPD (chronic obstructive pulmonary disease)     Kidney stone      Past Surgical History:  Past Surgical History:   Procedure Laterality Date    COLONOSCOPY N/A 04/20/2021    Procedure: COLONOSCOPY WITH ANESTHESIA;  Surgeon: Ti Lea DO;  Location: [facility] ENDOSCOPY;  Service: Gastroenterology;  Laterality: N/A;  pre op: family hx polyps  post op:poor prep  PCP: Chip Hillman DO    COLONOSCOPY N/A 04/21/2021    Procedure: COLONOSCOPY WITH ANESTHESIA;  Surgeon: Ti Lea DO;  Location: [facility] ENDOSCOPY;  Service: Gastroenterology;  Laterality: N/A;  Pre: Family hx Colon Polyps  Post: Colon Polyp  Dr. Chip Hillman  CO2 Inflation Used    HYSTERECTOMY  1996    Complete    OOPHORECTOMY       Social History:  reports that she has been smoking cigarettes. She has a 30.00 pack-year smoking history. She has never used smokeless tobacco. She reports that she does not drink alcohol and does not use drugs.    Family History: family history  includes Alcohol abuse in her father; Breast cancer in her sister; Cancer in her brother and father; Colon polyps in her brother and sister; Heart disease in her brother and mother; Osteoporosis in her brother, father, mother, and sister.       Allergies:  Allergies   Allergen Reactions    Codeine Hives    Keflex [Cephalexin] Hives    Ciprofloxacin Rash       Medications:  Prior to Admission medications    Medication Sig Start Date End Date Taking? Authorizing Provider   acetic acid-hydrocortisone (VOSOL-HC) 1-2 % otic solution Administer 4 drops into ear(s) as directed by provider 2 (Two) Times a Day. 3-4 drops in the affected ear 2-3 times a day 6/3/21   Kylie Booth APRN   albuterol (PROVENTIL HFA;VENTOLIN HFA) 108 (90 Base) MCG/ACT inhaler Inhale 2 puffs Daily As Needed. 3/17/17   Pam Mendez MD   albuterol sulfate HFA (Ventolin HFA) 108 (90 Base) MCG/ACT inhaler Daily. 3/17/21   Pam Mendez MD   cyclobenzaprine (FLEXERIL) 10 MG tablet Take 1 tablet by mouth 3 (Three) Times a Day As Needed for Muscle Spasms. 8/9/23   Lauren Chapman APRN   fluticasone (FLONASE) 50 MCG/ACT nasal spray  3/17/21   Pam Mendez MD   Humira Pen 40 MG/0.8ML Pen-injector Kit INJECT 0.8 MILLILITER (40 MG) BY SUBCUTANEOUS ROUTE EVERY 2 WEEKS IN THE ABDOMEN OR THIGH (ROTATE SITES) 3/19/21   Pam Mendez MD   HYDROcodone-acetaminophen (NORCO) 5-325 MG per tablet Take 1 tablet by mouth Every 6 (Six) Hours As Needed for Moderate Pain. 8/9/23   Lauren Chapman APRN   neomycin-polymyxin-hydrocortisone (CORTISPORIN) 1 % solution otic solution Administer 3 drops into both ears 2 (Two) Times a Day. 6/14/21   Kylie Booth APRN   vitamin B-12 (CYANOCOBALAMIN) 1000 MCG tablet Take 1,000 mcg by mouth Daily. 1/25/21   Pam Mendez MD   vitamin D (ERGOCALCIFEROL) 1.25 MG (50251 UT) capsule capsule TAKE 1 CAPSULE BY ORAL ROUTE 1 TIME PER WEEK ON SAME DAY EACH WEEK. 6/1/21    "ProviderPam MD   vitamin D3 125 MCG (5000 UT) capsule capsule Take 5,000 Units by mouth Daily.    ProviderPam MD     I have utilized all available immediate resources to obtain, update, or review the patient's current medications (including all prescriptions, over-the-counter products, herbals, cannabis/cannabidiol products, and vitamin/mineral/dietary (nutritional) supplements).    Objective     Vital Signs: /87   Pulse 67   Temp 97.9 øF (36.6 øC) (Temporal)   Resp 18   Ht 180.3 cm (71\")   Wt 59 kg (130 lb)   SpO2 97%   BMI 18.13 kg/mý   Physical Exam   GEN: Awake, alert, interactive, in NAD, thin  HEENT: Atraumatic, PERRLA, EOMI, Anicteric, Trachea midline  Lungs: CTAB, no wheezing/rales/rhonchi  Heart: RRR, +S1/s2, no rub  ABD: soft, nt/nd, +BS, no guarding/rebound  Extremities: atraumatic, no cyanosis, no edema  Skin: no rashes or lesions, r shin tiger tattoo   Neuro: AAOx3, no obvious focal deficits, MS 5/5 b/l LE, sensation intact, gait not tested  Psych: normal mood & affect        Results Reviewed:  Lab Results (last 24 hours)       Procedure Component Value Units Date/Time    STAT Lactic Acid, Reflex [[id number]]  (Normal) Collected: 08/16/23 1445    Specimen: Blood Updated: 08/16/23 1508     Lactate 1.5 mmol/L     Protime-INR [[id number]]  (Normal) Collected: 08/16/23 1244    Specimen: Blood Updated: 08/16/23 1357     Protime 12.9 Seconds      INR 0.96    aPTT [[id number]]  (Normal) Collected: 08/16/23 1244    Specimen: Blood Updated: 08/16/23 1357     PTT 27.0 seconds     Comprehensive Metabolic Panel [[id number]]  (Abnormal) Collected: 08/16/23 1147    Specimen: Blood Updated: 08/16/23 1215     Glucose 127 mg/dL      BUN 20 mg/dL      Creatinine 0.77 mg/dL      Sodium 130 mmol/L      Potassium 3.8 mmol/L      Comment: Slight hemolysis detected by analyzer. Results may be affected.        Chloride 92 mmol/L      CO2 24.0 mmol/L      Calcium 10.5 mg/dL      Total Protein 8.0 " g/dL      Albumin 4.1 g/dL      ALT (SGPT) 27 U/L      AST (SGOT) 25 U/L      Comment: Slight hemolysis detected by analyzer. Results may be affected.        Alkaline Phosphatase 75 U/L      Total Bilirubin 1.5 mg/dL      Globulin 3.9 gm/dL      A/G Ratio 1.1 g/dL      BUN/Creatinine Ratio 26.0     Anion Gap 14.0 mmol/L      eGFR 90.1 mL/min/1.73     Narrative:      GFR Normal >60  Chronic Kidney Disease <60  Kidney Failure <15      Magnesium [[id number]]  (Normal) Collected: 08/16/23 1147    Specimen: Blood Updated: 08/16/23 1215     Magnesium 2.0 mg/dL     Lactic Acid, Plasma [[id number]]  (Abnormal) Collected: 08/16/23 1147    Specimen: Blood Updated: 08/16/23 1214     Lactate 2.4 mmol/L     Urinalysis, Microscopic Only - Straight Cath [[id number]]  (Abnormal) Collected: 08/16/23 1159    Specimen: Urine from Straight Cath Updated: 08/16/23 1214     RBC, UA 31-50 /HPF      WBC, UA 0-2 /HPF      Comment: Urine culture not indicated.        Bacteria, UA None Seen /HPF      Squamous Epithelial Cells, UA 0-2 /HPF      Hyaline Casts, UA 3-6 /LPF      Methodology Automated Microscopy    Urinalysis With Culture If Indicated - Straight Cath [[id number]]  (Abnormal) Collected: 08/16/23 1159    Specimen: Urine from Straight Cath Updated: 08/16/23 1214     Color, UA Yellow     Appearance, UA Cloudy     pH, UA 7.5     Specific Gravity, UA 1.016     Glucose,  mg/dL (1+)     Ketones, UA Negative     Bilirubin, UA Negative     Blood, UA Moderate (2+)     Protein, UA 30 mg/dL (1+)     Leuk Esterase, UA Negative     Nitrite, UA Negative     Urobilinogen, UA 1.0 E.U./dL    Narrative:      In absence of clinical symptoms, the presence of pyuria, bacteria, and/or nitrites on the urinalysis result does not correlate with infection.    Lipase [[id number]]  (Normal) Collected: 08/16/23 1147    Specimen: Blood Updated: 08/16/23 1210     Lipase 16 U/L     CBC & Differential [[id number]]  (Abnormal) Collected: 08/16/23 1147    Specimen:  Blood Updated: 08/16/23 1157    Narrative:      The following orders were created for panel order CBC & Differential.  Procedure                               Abnormality         Status                     ---------                               -----------         ------                     CBC Auto Differential[[id number]]        Abnormal            Final result                 Please view results for these tests on the individual orders.    CBC Auto Differential [[id number]]  (Abnormal) Collected: 08/16/23 1147    Specimen: Blood Updated: 08/16/23 1157     WBC 27.17 10*3/mm3      RBC 5.03 10*6/mm3      Hemoglobin 15.2 g/dL      Hematocrit 43.5 %      MCV 86.5 fL      MCH 30.2 pg      MCHC 34.9 g/dL      RDW 14.2 %      RDW-SD 43.2 fl      MPV 11.3 fL      Platelets 233 10*3/mm3      Neutrophil % 85.8 %      Lymphocyte % 7.5 %      Monocyte % 5.2 %      Eosinophil % 0.0 %      Basophil % 0.3 %      Immature Grans % 1.2 %      Neutrophils, Absolute 23.32 10*3/mm3      Lymphocytes, Absolute 2.05 10*3/mm3      Monocytes, Absolute 1.40 10*3/mm3      Eosinophils, Absolute 0.01 10*3/mm3      Basophils, Absolute 0.07 10*3/mm3      Immature Grans, Absolute 0.32 10*3/mm3      nRBC 0.0 /100 WBC           Imaging Results (Last 24 Hours)       Procedure Component Value Units Date/Time    CT Angiogram Chest [[id number]] Collected: 08/16/23 1409     Updated: 08/16/23 1419    Narrative:      CT ANGIOGRAM CHEST- 8/16/2023 1:40 PM CDT      HISTORY: abnormal abd CT, r/o dissection      COMPARISON: CT angiogram dated 08/09/2023.      DOSE LENGTH PRODUCT: 218 mGy cm. Automated exposure control was also  utilized to decrease patient radiation dose.     TECHNIQUE: Helical tomographic images of the chest were obtained after  the administration of intravenous contrast following angiogram protocol.  Additionally, 3D and multiplanar reformatted images were provided.        FINDINGS:    Pulmonary arteries: There is adequate enhancement of the  pulmonary  arteries to evaluate for central and segmental pulmonary emboli. There  are no filling defects within the main, lobar, segmental or visualized  subsegmental pulmonary arteries. The pulmonary arteries are within  normal limits for size.      Aorta and great vessels: Thoracic aorta is normal in caliber. No  dissection identified. No flow-limiting stenosis identified at the great  vessel origins. No intramural hematoma.     Visualized neck base: The imaged portion of the base of the neck appears  unremarkable.      Lungs: Paraseptal emphysema. Lungs are clear and well expanded. No  pleural effusion. Airways are clear.     Heart: The heart is normal in size. There is no pericardial effusion.  Mild coronary atheromatous calcification.     Mediastinum and lymph nodes: No suspicious hilar or mediastinal  adenopathy.     Skeletal and soft tissues: Chest wall soft tissues are unremarkable.  Osteopenia. Exaggerated thoracic kyphosis. Vertebral body heights are  well-maintained.          Impression:      1. No acute aortic pathology or other acute cardiopulmonary process.  Thoracic aorta is nondilated.  2. Paraseptal emphysema.        This report was finalized on 08/16/2023 14:16 by Dr Brett Reyes, .    CT Abdomen Pelvis With Contrast [[id number]] Collected: 08/16/23 1254     Updated: 08/16/23 1324    Narrative:      EXAM/TECHNIQUE: CT abdomen pelvis with IV contrast     INDICATION: Abdominal pain, constipation, vomiting     COMPARISON: 08/09/2023     DLP: 218 mGy cm. Automated exposure control was also utilized to  decrease patient radiation dose.     FINDINGS:     The lung bases are clear.     No suspicious focal liver lesion. No gallstones or gallbladder wall  thickening. No biliary ductal dilatation. Spleen, pancreas, and adrenal  glands are unremarkable. No solid renal mass. No urolithiasis or  hydronephrosis. No focal urinary bladder wall thickening.     No colonic wall thickening or pericolonic fat  stranding. Normal  appendix. Moderate volume stool throughout the colon. No small bowel  distention or evidence of active small bowel inflammation.     No ascites or free pelvic fluid. No pelvic mass or pelvic collection.  Prior hysterectomy.     Nonaneurysmal atherosclerotic abdominal aorta. There is new eccentric  wall thickening along the anterior abdominal aorta beginning at the mid  abdominal aorta at the level of the renal vein, extending to the distal  abdominal aorta and into the RIGHT common iliac artery, highly  concerning for new intramural hematoma. This area of thickening measures  up to 6 mm thickness. No aortic dissection flap identified. No  penetrating atherosclerotic ulcer identified. The celiac artery SMA, and  JEFE are widely patent. Renal arteries are widely patent.     No enlarged retroperitoneal, mesenteric, pelvic, or inguinal lymph  nodes.     No acute abdominal wall soft tissue abnormality. Multilevel lumbar spine  degenerative change.       Impression:         1.  New eccentric thickening along the anterior abdominal aorta  beginning at the mid abdominal aorta and extending distally into the  RIGHT common iliac artery. This is favored to represent an acute  intramural hematoma. Of note, this finding was not present on a CT  performed recently on 08/09/2023.     2.  Moderate volume stool throughout the colon. Correlate for  constipation.     --- These findings were discussed with SRIDHAR SHARMA on 8/16/2023 1:18  PM CDT by Dr. Chris Us.  This report was finalized on 08/16/2023 13:21 by Dr. Chris Us MD.    CT Lumbar Spine Without Contrast [[id number]] Collected: 08/16/23 1255     Updated: 08/16/23 1303    Narrative:      CT LUMBAR SPINE WO CONTRAST- 8/16/2023 12:41 PM CDT     HISTORY: worsening low back pain      COMPARISON: None     DOSE LENGTH PRODUCT: 235 mGy cm. Automated exposure control was also  utilized to decrease patient radiation dose.     TECHNIQUE: Axial images lumbar  spine obtained with sagittal coronal  reconstructions     FINDINGS:  The alignment of lumbar spine is appropriate. There is  osteopenia. There is advanced degenerative disc change along the right  L4/L5 disc with a slight left convexity of the lumbar curvature  measuring 10 degrees with apex at the L4 level. There is a prominent  right L5 transverse process articulates with the sacrum. There is no  compression deformity or acute lumbar vertebral fracture. There is  moderate lower lumbar facet arthropathy.     At L3/L4, there is mild broad-based disc bulging with mild to moderate  facet arthropathy. Mild to moderate central canal stenosis with no  prominent foraminal narrowing.     At L4/L5, there is posterior endplate spurring and disc bulging with a  right foraminal predominance with moderate right mild left facet  arthropathy. There is moderate central canal stenosis with moderate  right foraminal narrowing.     At L5-S1, no prominent central canal stenosis. There is moderate left  foraminal narrowing related to posterior lateral endplate spurring and  mild to moderate facet arthropathy.     Mild to moderate vascular calcification.       Impression:      1. Osteopenia with mild left convexity of the degenerative lumbar spine.  There is moderate central canal stenosis at the L4/L5 level with  moderate right L4/L5 and left L5-S1 foraminal narrowing as described  above. No acute lumbar vertebral fracture or traumatic malalignment.  This report was finalized on 08/16/2023 13:00 by Dr. Kylie Toro MD.          I have personally reviewed and interpreted the radiology studies and ECG obtained at time of admission.     Assessment / Plan   Assessment:   Active Hospital Problems    Diagnosis     **Hypertensive urgency     Back pain     Tobacco use     Abnormal CT of the abdomen - ? intramural hematoma from aorta to R iliac     Constipation        Treatment Plan  The patient will be admitted to my service here at [facility].     #1 hypertensive urgency -status post multiple doses of IV medicines in the ER as well as pain medicines.  Was on Cardene for about half an hour now off.  No oral meds given.  We will try to work on pain control.  We will give amlodipine 2.5 p.o. and monitor closely.  IV labetalol as needed.    #2 abnormal CT of the abdomen -significant constipation question possible intramural hematoma extending from aorta to right common iliac.  Vascular aware and will see in the morning.  No additional imaging requested at this time. Blood pressure control.  Supportive care.    #3 constipation -potentially in the setting of recent pain medication needs and usage.  She has no red flag neurological symptoms of her lower extremities.  No saddle paresthesia.  We will start with bowel regimen, MiraLAX, bisacodyl suppository.  If that does not work we will give an enema.    #4 back pain -possibly multifactorial from her lumbar DJD noted above.  Constipation.  Intramural hematoma.  Patient states her sciatica pain from last week has improved.  She is on Norco and Flexeril.  Monitor closely and outpatient follow-up is stable.  Patient states she has been able to ambulate okay.    #5 tobacco use -discussed cessation, declined nicotine patch    #6 leukocytosis -was 19.8 back on the ninth, now 27.  Unclear if she got any steroids at any point for her back.  Possible stress reaction and dehydration as well.  No fevers.  No obvious signs of infection.  Fluids tonight and recheck in the morning.  Monitor for fevers or antibiotic needs.    #7 hyponatremia -mild at 130.  Suspect hypovolemia/dehydration.  Again patient had multiple episodes of bilious vomit the last 24 hours and poor p.o. intake for the last day and a half.  Gentle fluid resuscitation and monitor.    #8 nausea and vomiting/elevated bilirubin -1.5.  Transaminases otherwise normal.  Bilirubin was normal last week.  Possibly related to GI issues.  CT did not  mention any issues with gallbladder.  Hydrate and recheck in the morning.  May want to consider a gallbladder ultrasound if any worsening given her vomiting issues but feel those were more likely related to constipation.    Medical Decision Making  Number and Complexity of problems: 4+ acute, 1 chronic  Differential Diagnosis: As above    Conditions and Status        Patient is new to me be admitted overnight for monitoring and further care     MDM Data  External documents reviewed: Prior ER visit  Cardiac tracing (EKG, telemetry) interpretation: Sinus without any acute ST-T changes  Radiology interpretation: Reports reviewed  Labs reviewed: As above  Any tests that were considered but not ordered: None     Decision rules/scores evaluated (example [id number], Wells, etc): None     Discussed with: ER provider, Dr. Lundberg, patient     Care Planning  Shared decision making: Patient apprised of current labs, vitals, imaging and treatment plan.  They are agreeable with proceeding with plans as discussed.    Code status and discussions: full code    Disposition  Social Determinants of Health that impact treatment or disposition: none  Estimated length of stay is 1-2 days.     I confirmed that the patient's advanced care plan is present, code status is documented, and a surrogate decision maker is listed in the patient's medical record.     The patient's surrogate decision maker is her sister Cammy.     The patient was seen and examined by me on 8/16/23 at 8:05 PM.    Electronically signed by Betito May DO, 08/16/23, 20:57 CDT.

## 2023-08-17 NOTE — PLAN OF CARE
Goal Outcome Evaluation:  Patient arrived to floor from ER around 21:00.  Patient AO x 4, states pain is better at time of arrival, 3/10.  Patient on room air, independent, denies SOB, slight nausea but denies need for medication.  /97, which is down from the last ER reading.     Patient rested a couple of hours, woke by pain 7/10, Norco given followed by flexeril.  Patient states pain is a little better but she can't get comfortable.  Will continue to monitor.  Problem: Adult Inpatient Plan of Care  Goal: Absence of Hospital-Acquired Illness or Injury  Outcome: Ongoing, Progressing  Intervention: Identify and Manage Fall Risk  Recent Flowsheet Documentation  Taken 8/16/2023 2114 by Elba Mace RN  Safety Promotion/Fall Prevention: safety round/check completed  Intervention: Prevent Skin Injury  Recent Flowsheet Documentation  Taken 8/16/2023 2114 by Ebla Mace RN  Body Position: position changed independently  Skin Protection: adhesive use limited  Intervention: Prevent Infection  Recent Flowsheet Documentation  Taken 8/16/2023 2114 by Elba Mace RN  Infection Prevention:   rest/sleep promoted   single patient room provided  Goal: Optimal Comfort and Wellbeing  Outcome: Ongoing, Progressing  Intervention: Monitor Pain and Promote Comfort  Recent Flowsheet Documentation  Taken 8/17/2023 0346 by Elba Mace RN  Pain Management Interventions: see MAR  Taken 8/17/2023 0040 by Elba Mace RN  Pain Management Interventions:   pillow support provided   see MAR  Intervention: Provide Person-Centered Care  Recent Flowsheet Documentation  Taken 8/16/2023 2114 by Elba Mace RN  Trust Relationship/Rapport:   care explained   choices provided   emotional support provided  Goal: Readiness for Transition of Care  Outcome: Ongoing, Progressing  Intervention: Mutually Develop Transition Plan  Recent Flowsheet Documentation  Taken 8/16/2023 2109 by Elba Mace, RN  Equipment Currently Used at Home:  none

## 2023-08-17 NOTE — ED NOTES
Nursing report ED to floor  Sol Fontenot  58 y.o.  female    HPI:   Chief Complaint   Patient presents with    Back Pain    Constipation       Admitting doctor:   Betito May DO    Consulting provider(s):  Consults       No orders found from 7/18/2023 to 8/17/2023.             Admitting diagnosis:   The primary encounter diagnosis was Hypertension, unspecified type. Diagnoses of Abdominal pain, unspecified abdominal location and Acute low back pain, unspecified back pain laterality, unspecified whether sciatica present were also pertinent to this visit.    Code status:   Current Code Status       Date Active Code Status Order ID Comments User Context       Not on file            Allergies:   Codeine, Keflex [cephalexin], and Ciprofloxacin    Intake and Output  No intake or output data in the 24 hours ending 08/16/23 2010    Weight:       08/16/23  1113   Weight: 59 kg (130 lb)       Most recent vitals:   Vitals:    08/16/23 1910 08/16/23 1916 08/16/23 1931 08/16/23 1946   BP: 129/88 138/87 152/82 144/87   Pulse: 75 72 65 67   Resp:       Temp:       TempSrc:       SpO2: 99% 100% 98% 97%   Weight:       Height:         Oxygen Therapy: .    Active LDAs/IV Access:   Lines, Drains & Airways       Active LDAs       Name Placement date Placement time Site Days    Peripheral IV 08/16/23 1145 Anterior;Left Forearm 08/16/23  1145  Forearm  less than 1                    Labs (abnormal labs have a star):   Labs Reviewed   COMPREHENSIVE METABOLIC PANEL - Abnormal; Notable for the following components:       Result Value    Glucose 127 (*)     Sodium 130 (*)     Chloride 92 (*)     Total Bilirubin 1.5 (*)     BUN/Creatinine Ratio 26.0 (*)     All other components within normal limits    Narrative:     GFR Normal >60  Chronic Kidney Disease <60  Kidney Failure <15     URINALYSIS W/ CULTURE IF INDICATED - Abnormal; Notable for the following components:    Appearance, UA Cloudy (*)     Glucose,  mg/dL (1+) (*)      Blood, UA Moderate (2+) (*)     Protein, UA 30 mg/dL (1+) (*)     All other components within normal limits    Narrative:     In absence of clinical symptoms, the presence of pyuria, bacteria, and/or nitrites on the urinalysis result does not correlate with infection.   LACTIC ACID, PLASMA - Abnormal; Notable for the following components:    Lactate 2.4 (*)     All other components within normal limits   CBC WITH AUTO DIFFERENTIAL - Abnormal; Notable for the following components:    WBC 27.17 (*)     Neutrophil % 85.8 (*)     Lymphocyte % 7.5 (*)     Eosinophil % 0.0 (*)     Immature Grans % 1.2 (*)     Neutrophils, Absolute 23.32 (*)     Monocytes, Absolute 1.40 (*)     Immature Grans, Absolute 0.32 (*)     All other components within normal limits   URINALYSIS, MICROSCOPIC ONLY - Abnormal; Notable for the following components:    RBC, UA 31-50 (*)     WBC, UA 0-2 (*)     All other components within normal limits   LIPASE - Normal   MAGNESIUM - Normal   LACTIC ACID, REFLEX - Normal   PROTIME-INR - Normal   APTT - Normal   CBC AND DIFFERENTIAL    Narrative:     The following orders were created for panel order CBC & Differential.  Procedure                               Abnormality         Status                     ---------                               -----------         ------                     CBC Auto Differential[[id number]]        Abnormal            Final result                 Please view results for these tests on the individual orders.       Meds given in ED:   Medications   sodium chloride 0.9 % flush 10 mL (has no administration in time range)   niCARdipine (CARDENE) 25 mg in 250 mL NS infusion (0 mg/hr Intravenous Hold 8/16/23 1710)   iopamidol (ISOVUE-300) 61 % injection 100 mL (100 mL Intravenous Given 8/16/23 1246)   HYDROmorphone (DILAUDID) injection 1 mg (1 mg Intravenous Given 8/16/23 1328)   ondansetron (ZOFRAN) injection 4 mg (4 mg Intravenous Given 8/16/23 1327)   labetalol  (NORMODYNE,TRANDATE) injection 20 mg (20 mg Intravenous Given 8/16/23 1330)   iopamidol (ISOVUE-370) 76 % injection 100 mL (100 mL Intravenous Given 8/16/23 1355)   sodium chloride 0.9 % bolus 1,000 mL (0 mL Intravenous Stopped 8/16/23 1632)   labetalol (NORMODYNE,TRANDATE) injection 10 mg (10 mg Intravenous Given 8/16/23 1541)   hydrALAZINE (APRESOLINE) injection 20 mg (20 mg Intravenous Given 8/16/23 1801)   acetaminophen (TYLENOL) tablet 1,000 mg (1,000 mg Oral Given 8/16/23 1854)   HYDROmorphone (DILAUDID) injection 1 mg (1 mg Intravenous Given 8/16/23 1926)     niCARdipine, 5-15 mg/hr, Last Rate: Stopped (08/16/23 1710)         NIH Stroke Scale:       Isolation/Infection(s):  No active isolations   No active infections     COVID Testing  Collected .  Resulted .    Nursing report ED to floor:  Mental status: .  Ambulatory status: .  Precautions: .    ED nurse phone extentsion- ..

## 2023-08-17 NOTE — PLAN OF CARE
Goal Outcome Evaluation:              Outcome Evaluation: NTN assessment complete. CLD at this time with small BM this morning. Pt denied food allergies, N/V since admit, and weight changes. Pt has partial dentures at home; would prefer soft foods when diet advanced. NTN following per protocol.

## 2023-08-17 NOTE — PROGRESS NOTES
[facility] Medicine Services  INPATIENT PROGRESS NOTE    Patient Name: Sol Fontenot  Date of Admission: 8/16/2023  Today's Date: 08/17/23  Length of Stay: 0  Primary Care Physician: Chip Hillman DO    Subjective   Chief Complaint: Back pain, no bowel movement recently  HPI   Patient examined lying in bed on room air, no acute distress, denies chest pain or shortness of breath.  She endorses lower back pain and feelings of fullness in her abdomen.  She tells me she has not had a bowel movement in 8 days.  She tells me her pain is relatively under control and improved from when she arrived.  Her blood pressure trend has improved as well.  I explained it would be very important to continue controlling her blood pressure to avoid hypotension.  Also explained we would initiate a bowel regimen today.  Additionally, vascular surgery would evaluate her today regarding potential hematoma suggested on CT of her abdomen.  She expresses understanding and is agreeable to her plan of care.    Review of Systems   All pertinent negatives and positives are as above. All other systems have been reviewed and are negative unless otherwise stated.     Objective    Temp:  [97.8 øF (36.6 øC)-98.3 øF (36.8 øC)] 98.3 øF (36.8 øC)  Heart Rate:  [53-90] 71  Resp:  [16-18] 16  BP: (113-192)/() 159/94  Physical Exam  Vitals and nursing note reviewed.   Constitutional:       Comments: Up in bed, no acute distress, room air, no visitors at bedside   HENT:      Head: Normocephalic and atraumatic.   Cardiovascular:      Rate and Rhythm: Normal rate and regular rhythm.      Pulses: Normal pulses.      Heart sounds: Normal heart sounds. No murmur heard.    No friction rub.   Pulmonary:      Effort: Pulmonary effort is normal. No respiratory distress.      Breath sounds: Normal breath sounds. No stridor. No wheezing or rhonchi.   Abdominal:      General: Bowel sounds are normal. There is no  distension.      Palpations: Abdomen is soft.      Comments: No increased tenderness to palpation   Musculoskeletal:         General: No swelling or deformity. Normal range of motion.      Cervical back: Normal range of motion and neck supple. No rigidity or tenderness.      Right lower leg: No edema.      Left lower leg: No edema.   Skin:     General: Skin is warm and dry.      Capillary Refill: Capillary refill takes less than 2 seconds.      Coloration: Skin is not pale.      Findings: No bruising or erythema.   Neurological:      General: No focal deficit present.      Mental Status: She is alert and oriented to person, place, and time. Mental status is at baseline.      Sensory: No sensory deficit.      Gait: Gait normal.   Psychiatric:         Mood and Affect: Mood normal.         Behavior: Behavior normal.         Thought Content: Thought content normal.         Judgment: Judgment normal.     Results Review:  I have reviewed the labs, radiology results, and diagnostic studies.    Laboratory Data:   Results from last 7 days   Lab Units 08/17/23  0411 08/16/23  1147   WBC 10*3/mm3 19.54* 27.17*   HEMOGLOBIN g/dL 14.1 15.2   HEMATOCRIT % 41.4 43.5   PLATELETS 10*3/mm3 211 233        Results from last 7 days   Lab Units 08/17/23  0411 08/16/23  1147   SODIUM mmol/L 133* 130*   POTASSIUM mmol/L 3.9 3.8   CHLORIDE mmol/L 98 92*   CO2 mmol/L 21.0* 24.0   BUN mg/dL 19 20   CREATININE mg/dL 0.65 0.77   CALCIUM mg/dL 9.5 10.5   BILIRUBIN mg/dL 0.9 1.5*   ALK PHOS U/L 64 75   ALT (SGPT) U/L 21 27   AST (SGOT) U/L 18 25   GLUCOSE mg/dL 138* 127*     Radiology Data:   Imaging Results (Last 24 Hours)       Procedure Component Value Units Date/Time    CT Angiogram Chest [[id number]] Collected: 08/16/23 1409     Updated: 08/16/23 1419    Narrative:      CT ANGIOGRAM CHEST- 8/16/2023 1:40 PM CDT      HISTORY: abnormal abd CT, r/o dissection      COMPARISON: CT angiogram dated 08/09/2023.      DOSE LENGTH PRODUCT: 218 mGy cm.  Automated exposure control was also  utilized to decrease patient radiation dose.     TECHNIQUE: Helical tomographic images of the chest were obtained after  the administration of intravenous contrast following angiogram protocol.  Additionally, 3D and multiplanar reformatted images were provided.        FINDINGS:    Pulmonary arteries: There is adequate enhancement of the pulmonary  arteries to evaluate for central and segmental pulmonary emboli. There  are no filling defects within the main, lobar, segmental or visualized  subsegmental pulmonary arteries. The pulmonary arteries are within  normal limits for size.      Aorta and great vessels: Thoracic aorta is normal in caliber. No  dissection identified. No flow-limiting stenosis identified at the great  vessel origins. No intramural hematoma.     Visualized neck base: The imaged portion of the base of the neck appears  unremarkable.      Lungs: Paraseptal emphysema. Lungs are clear and well expanded. No  pleural effusion. Airways are clear.     Heart: The heart is normal in size. There is no pericardial effusion.  Mild coronary atheromatous calcification.     Mediastinum and lymph nodes: No suspicious hilar or mediastinal  adenopathy.     Skeletal and soft tissues: Chest wall soft tissues are unremarkable.  Osteopenia. Exaggerated thoracic kyphosis. Vertebral body heights are  well-maintained.          Impression:      1. No acute aortic pathology or other acute cardiopulmonary process.  Thoracic aorta is nondilated.  2. Paraseptal emphysema.        This report was finalized on 08/16/2023 14:16 by Dr Brett Reyes, .    CT Abdomen Pelvis With Contrast [[id number]] Collected: 08/16/23 1254     Updated: 08/16/23 1324    Narrative:      EXAM/TECHNIQUE: CT abdomen pelvis with IV contrast     INDICATION: Abdominal pain, constipation, vomiting     COMPARISON: 08/09/2023     DLP: 218 mGy cm. Automated exposure control was also utilized to  decrease patient radiation  dose.     FINDINGS:     The lung bases are clear.     No suspicious focal liver lesion. No gallstones or gallbladder wall  thickening. No biliary ductal dilatation. Spleen, pancreas, and adrenal  glands are unremarkable. No solid renal mass. No urolithiasis or  hydronephrosis. No focal urinary bladder wall thickening.     No colonic wall thickening or pericolonic fat stranding. Normal  appendix. Moderate volume stool throughout the colon. No small bowel  distention or evidence of active small bowel inflammation.     No ascites or free pelvic fluid. No pelvic mass or pelvic collection.  Prior hysterectomy.     Nonaneurysmal atherosclerotic abdominal aorta. There is new eccentric  wall thickening along the anterior abdominal aorta beginning at the mid  abdominal aorta at the level of the renal vein, extending to the distal  abdominal aorta and into the RIGHT common iliac artery, highly  concerning for new intramural hematoma. This area of thickening measures  up to 6 mm thickness. No aortic dissection flap identified. No  penetrating atherosclerotic ulcer identified. The celiac artery SMA, and  JEFE are widely patent. Renal arteries are widely patent.     No enlarged retroperitoneal, mesenteric, pelvic, or inguinal lymph  nodes.     No acute abdominal wall soft tissue abnormality. Multilevel lumbar spine  degenerative change.       Impression:         1.  New eccentric thickening along the anterior abdominal aorta  beginning at the mid abdominal aorta and extending distally into the  RIGHT common iliac artery. This is favored to represent an acute  intramural hematoma. Of note, this finding was not present on a CT  performed recently on 08/09/2023.     2.  Moderate volume stool throughout the colon. Correlate for  constipation.     --- These findings were discussed with SRIDHAR SHARMA on 8/16/2023 1:18  PM CDT by Dr. Chris Us.  This report was finalized on 08/16/2023 13:21 by Dr. Chris Us MD.    CT Lumbar  Spine Without Contrast [[id number]] Collected: 08/16/23 1255     Updated: 08/16/23 1303    Narrative:      CT LUMBAR SPINE WO CONTRAST- 8/16/2023 12:41 PM CDT     HISTORY: worsening low back pain      COMPARISON: None     DOSE LENGTH PRODUCT: 235 mGy cm. Automated exposure control was also  utilized to decrease patient radiation dose.     TECHNIQUE: Axial images lumbar spine obtained with sagittal coronal  reconstructions     FINDINGS:  The alignment of lumbar spine is appropriate. There is  osteopenia. There is advanced degenerative disc change along the right  L4/L5 disc with a slight left convexity of the lumbar curvature  measuring 10 degrees with apex at the L4 level. There is a prominent  right L5 transverse process articulates with the sacrum. There is no  compression deformity or acute lumbar vertebral fracture. There is  moderate lower lumbar facet arthropathy.     At L3/L4, there is mild broad-based disc bulging with mild to moderate  facet arthropathy. Mild to moderate central canal stenosis with no  prominent foraminal narrowing.     At L4/L5, there is posterior endplate spurring and disc bulging with a  right foraminal predominance with moderate right mild left facet  arthropathy. There is moderate central canal stenosis with moderate  right foraminal narrowing.     At L5-S1, no prominent central canal stenosis. There is moderate left  foraminal narrowing related to posterior lateral endplate spurring and  mild to moderate facet arthropathy.     Mild to moderate vascular calcification.       Impression:      1. Osteopenia with mild left convexity of the degenerative lumbar spine.  There is moderate central canal stenosis at the L4/L5 level with  moderate right L4/L5 and left L5-S1 foraminal narrowing as described  above. No acute lumbar vertebral fracture or traumatic malalignment.  This report was finalized on 08/16/2023 13:00 by Dr. Kylie Toro MD.            I have reviewed the patient's current  medications.     Assessment/Plan   Assessment  Active Hospital Problems    Diagnosis     **Hypertensive urgency     Back pain     Tobacco use     Abnormal CT of the abdomen - ? intramural hematoma from aorta to R iliac     Constipation        Treatment Plan  Hypertensive urgency-  Patient hypertensive upon arrival.  As needed antihypertensives administered in the emergency department to gain blood pressure control.  Amlodipine 2.5 mg initiated, increased to 5 mg this morning.  As needed labetalol.  Blood pressure trend overall improved    Abnormal CT of the abdomen-  Radiologist report favors acute intramural hematoma from the mid abdominal aorta extending distally into the right common iliac artery.  This finding was not present on CTA abdomen pelvis from 8/9.  Vascular surgery consulted.    Back pain-  Patient has chronic back pain and takes Flexeril and Norco at home.  CT lumbar spine shows no acute fracture.  It does show degenerative changes and stenosis.  Unsure if increasing baseline back pain could be related to vascular findings.  Continue home medications of Flexeril and Norco.  Tylenol.    Constipation-  Patient reports no bowel movement for 8 days  CT abdomen suggest constipation  Bowel regimen initiated.    Leukocytosis improving, no obvious signs or symptoms of infection.  Potentially related to dehydration or stress reaction.    Lovenox for DVT prophylaxis    Medical Decision Making  Number and Complexity of problems: 1 acute problem of high complexity in abnormal CT of the abdomen suggesting potential intramural hematoma.  1 acute problem of high complexity in hypertensive urgency.  1 acute problem of moderate complexity and back pain.  1 acute problem of moderate complexity and constipation.  Differential Diagnosis: Etiology of CT abdomen findings    Conditions and Status  Condition stable at this time     MDM Data  External documents reviewed: None  Cardiac tracing (EKG, telemetry) interpretation:  EKG reviewed  Radiology interpretation: CTA chest, CT abdomen pelvis with contrast reviewed  Labs reviewed: CBC, BMP, urinalysis  Any tests that were considered but not ordered: None     Decision rules/scores evaluated (example [id number], Wells, etc): None     Discussed with: Patient and Dr. Reynoso     Care Planning  Shared decision making: Discussed with above, patient agreeable to plan of care  Code status and discussions: Full    Disposition  Social Determinants of Health that impact treatment or disposition: None  I expect the patient to likely eventually be discharged home, timeline undeterminable at this time    Electronically signed by KYRA Evans, 08/17/23, 10:11 CDT.

## 2023-08-18 LAB
ALBUMIN SERPL-MCNC: 3.4 G/DL (ref 3.5–5.2)
ALBUMIN/GLOB SERPL: 1.2 G/DL
ALP SERPL-CCNC: 59 U/L (ref 39–117)
ALT SERPL W P-5'-P-CCNC: 21 U/L (ref 1–33)
ANION GAP SERPL CALCULATED.3IONS-SCNC: 9 MMOL/L (ref 5–15)
AST SERPL-CCNC: 16 U/L (ref 1–32)
BILIRUB SERPL-MCNC: 1 MG/DL (ref 0–1.2)
BUN SERPL-MCNC: 15 MG/DL (ref 6–20)
BUN/CREAT SERPL: 21.7 (ref 7–25)
CALCIUM SPEC-SCNC: 8.8 MG/DL (ref 8.6–10.5)
CHLORIDE SERPL-SCNC: 101 MMOL/L (ref 98–107)
CO2 SERPL-SCNC: 24 MMOL/L (ref 22–29)
CREAT SERPL-MCNC: 0.69 MG/DL (ref 0.57–1)
DEPRECATED RDW RBC AUTO: 46 FL (ref 37–54)
EGFRCR SERPLBLD CKD-EPI 2021: 101.4 ML/MIN/1.73
ERYTHROCYTE [DISTWIDTH] IN BLOOD BY AUTOMATED COUNT: 14.5 % (ref 12.3–15.4)
GLOBULIN UR ELPH-MCNC: 2.9 GM/DL
GLUCOSE SERPL-MCNC: 86 MG/DL (ref 65–99)
HCT VFR BLD AUTO: 44.3 % (ref 34–46.6)
HGB BLD-MCNC: 14.7 G/DL (ref 12–15.9)
MCH RBC QN AUTO: 29.6 PG (ref 26.6–33)
MCHC RBC AUTO-ENTMCNC: 33.2 G/DL (ref 31.5–35.7)
MCV RBC AUTO: 89.1 FL (ref 79–97)
PLATELET # BLD AUTO: 206 10*3/MM3 (ref 140–450)
PMV BLD AUTO: 11.3 FL (ref 6–12)
POTASSIUM SERPL-SCNC: 3.6 MMOL/L (ref 3.5–5.2)
PROT SERPL-MCNC: 6.3 G/DL (ref 6–8.5)
RBC # BLD AUTO: 4.97 10*6/MM3 (ref 3.77–5.28)
SODIUM SERPL-SCNC: 134 MMOL/L (ref 136–145)
WBC NRBC COR # BLD: 13.46 10*3/MM3 (ref 3.4–10.8)

## 2023-08-18 PROCEDURE — 25010000002 ENOXAPARIN PER 10 MG: Performed by: INTERNAL MEDICINE

## 2023-08-18 PROCEDURE — 85027 COMPLETE CBC AUTOMATED: CPT

## 2023-08-18 PROCEDURE — 80053 COMPREHEN METABOLIC PANEL: CPT

## 2023-08-18 PROCEDURE — 99204 OFFICE O/P NEW MOD 45 MIN: CPT | Performed by: SURGERY

## 2023-08-18 PROCEDURE — 96372 THER/PROPH/DIAG INJ SC/IM: CPT

## 2023-08-18 PROCEDURE — G0378 HOSPITAL OBSERVATION PER HR: HCPCS

## 2023-08-18 RX ORDER — NICOTINE 21 MG/24HR
1 PATCH, TRANSDERMAL 24 HOURS TRANSDERMAL
Status: DISCONTINUED | OUTPATIENT
Start: 2023-08-18 | End: 2023-08-21 | Stop reason: HOSPADM

## 2023-08-18 RX ORDER — HYDROCODONE BITARTRATE AND ACETAMINOPHEN 10; 325 MG/1; MG/1
1 TABLET ORAL EVERY 6 HOURS PRN
Status: DISCONTINUED | OUTPATIENT
Start: 2023-08-18 | End: 2023-08-21 | Stop reason: HOSPADM

## 2023-08-18 RX ORDER — AMLODIPINE BESYLATE 10 MG/1
10 TABLET ORAL
Status: DISCONTINUED | OUTPATIENT
Start: 2023-08-18 | End: 2023-08-21 | Stop reason: HOSPADM

## 2023-08-18 RX ADMIN — HYDROCODONE BITARTRATE AND ACETAMINOPHEN 1 TABLET: 10; 325 TABLET ORAL at 10:49

## 2023-08-18 RX ADMIN — CYCLOBENZAPRINE 10 MG: 10 TABLET, FILM COATED ORAL at 09:07

## 2023-08-18 RX ADMIN — NICOTINE 1 PATCH: 14 PATCH, EXTENDED RELEASE TRANSDERMAL at 10:50

## 2023-08-18 RX ADMIN — HYDROCODONE BITARTRATE AND ACETAMINOPHEN 1 TABLET: 5; 325 TABLET ORAL at 03:02

## 2023-08-18 RX ADMIN — ENOXAPARIN SODIUM 40 MG: 100 INJECTION SUBCUTANEOUS at 21:04

## 2023-08-18 RX ADMIN — HYDROCORTISONE 1 APPLICATION: 1 CREAM TOPICAL at 09:08

## 2023-08-18 RX ADMIN — HYDROCODONE BITARTRATE AND ACETAMINOPHEN 1 TABLET: 10; 325 TABLET ORAL at 16:56

## 2023-08-18 RX ADMIN — NALOXEGOL OXALATE 25 MG: 25 TABLET, FILM COATED ORAL at 09:08

## 2023-08-18 RX ADMIN — ACETAMINOPHEN 650 MG: 325 TABLET, FILM COATED ORAL at 09:07

## 2023-08-18 RX ADMIN — SODIUM CHLORIDE 75 ML/HR: 9 INJECTION, SOLUTION INTRAVENOUS at 10:50

## 2023-08-18 RX ADMIN — AMLODIPINE BESYLATE 10 MG: 10 TABLET ORAL at 09:08

## 2023-08-18 RX ADMIN — BISACODYL 10 MG: 10 SUPPOSITORY RECTAL at 09:08

## 2023-08-18 RX ADMIN — Medication 10 ML: at 09:08

## 2023-08-18 RX ADMIN — LISINOPRIL 10 MG: 10 TABLET ORAL at 09:08

## 2023-08-18 RX ADMIN — CYCLOBENZAPRINE 10 MG: 10 TABLET, FILM COATED ORAL at 23:10

## 2023-08-18 RX ADMIN — DOCUSATE SODIUM 50 MG AND SENNOSIDES 8.6 MG 2 TABLET: 8.6; 5 TABLET, FILM COATED ORAL at 21:06

## 2023-08-18 RX ADMIN — HYDROCORTISONE 1 APPLICATION: 1 CREAM TOPICAL at 21:04

## 2023-08-18 RX ADMIN — POLYETHYLENE GLYCOL 3350 17 G: 17 POWDER, FOR SOLUTION ORAL at 09:07

## 2023-08-18 RX ADMIN — HYDROCODONE BITARTRATE AND ACETAMINOPHEN 1 TABLET: 10; 325 TABLET ORAL at 04:09

## 2023-08-18 NOTE — CONSULTS
Sol Fontenot  [id number]  [id number]  411/1  Easton Reynoso MD  8/16/2023    Chief Complaint   Patient presents with    Back Pain    Constipation       HPI: The patient is a 58-year-old female who about a week and a half ago  started having back pain and discomfort in her low back shooting down into her right thigh.  Sciatica-like symptoms and she came to the ER was evaluated and sent home with outpatient follow-up.  Pain continued she saw her primary earlier in the week and was started on some Norco and Flexeril.  The night before admission, she started having episodes of bilious emesis. She has not been able to eat or drink much the last 1 to 1-1/2 days.  Due to her progression of symptoms she came back to the ER.  On arrival she was significantly hypertensive with a blood pressure up to 192 systolic and with diastolics frequently in the 110s.  She was given medicines for pain and IV blood pressure medications.  Her BP continued to be elevated and eventually she was started on Cardene although very briefly for about half an hour and her pressure dropped into the 120s.  Since stopping the drip though she received another dose of pain medication and IV blood pressure medicine. She had a CTA of her chest which was nonacute without any thoracic aortic issues.  CT abd/pelvis was done with contrast alone and did question a potential thickening of her mid abdominal aorta extending to her right common iliac artery concerning for possible intramural hematoma.  She was also extremely constipated and has not had a bowel movement in 3 weeks.    Past Medical History:   Diagnosis Date    Arthritis     Asthma     COPD (chronic obstructive pulmonary disease)     Kidney stone        Past Surgical History:   Procedure Laterality Date    COLONOSCOPY N/A 04/20/2021    Procedure: COLONOSCOPY WITH ANESTHESIA;  Surgeon: Ti Lea DO;  Location: [facility] ENDOSCOPY;  Service: Gastroenterology;  Laterality: N/A;  pre op: family  hx polyps  post op:poor prep  PCP: Chip Hillman DO    COLONOSCOPY N/A 04/21/2021    Procedure: COLONOSCOPY WITH ANESTHESIA;  Surgeon: Ti Lea DO;  Location: [facility] ENDOSCOPY;  Service: Gastroenterology;  Laterality: N/A;  Pre: Family hx Colon Polyps  Post: Colon Polyp  Dr. Chip Hillman  CO2 Inflation Used    HYSTERECTOMY  1996    Complete    OOPHORECTOMY         Family History   Problem Relation Age of Onset    Heart disease Mother     Osteoporosis Mother     Alcohol abuse Father     Cancer Father     Osteoporosis Father     Osteoporosis Sister     Breast cancer Sister     Colon polyps Sister     Cancer Brother     Heart disease Brother     Osteoporosis Brother     Colon polyps Brother     Esophageal cancer Neg Hx        Social History     Socioeconomic History    Marital status:    Tobacco Use    Smoking status: Every Day     Packs/day: 1.00     Years: 30.00     Pack years: 30.00     Types: Cigarettes    Smokeless tobacco: Never   Vaping Use    Vaping Use: Never used   Substance and Sexual Activity    Alcohol use: No    Drug use: No    Sexual activity: Yes     Partners: Male     Birth control/protection: Surgical       Allergies   Allergen Reactions    Codeine Hives    Keflex [Cephalexin] Hives    Ciprofloxacin Rash       Hospital Medications (active)         Dose Frequency Start End    acetaminophen (TYLENOL) tablet 650 mg 650 mg Every 4 Hours PRN 8/16/2023     Admin Instructions: If given for fever, use fever parameter: fever greater than 100.4 øF  Based on patient request - if ordered for moderate or severe pain, provider allows for administration of a medication prescribed for a lower pain scale.    Do not exceed 4 grams of acetaminophen in a 24 hr period. Max dose of 2gm for AST/ALT greater than 120 units/L.    If given for pain, use the following pain scale:   Mild Pain = Pain Score of 1-3, CPOT 1-2  Moderate Pain = Pain Score of 4-6, CPOT 3-4  Severe Pain = Pain Score of 7-10, CPOT  5-8    Route: Oral    albuterol (PROVENTIL) nebulizer solution 0.083% 2.5 mg/3mL 2.5 mg Every 6 Hours PRN 8/16/2023     Admin Instructions: Include Respiratory Treatment Education    Route: Nebulization    amLODIPine (NORVASC) tablet 10 mg 10 mg Every 24 Hours Scheduled 8/18/2023     Admin Instructions: Hold for SBP less than 100, DBP less than 60  Caution: Look alike/sound alike drug alert. Avoid grapefruit juice.    Route: Oral    bisacodyl (DULCOLAX) suppository 10 mg 10 mg Daily 8/17/2023     Admin Instructions: Hold for diarrhea    Route: Rectal    cyclobenzaprine (FLEXERIL) tablet 10 mg 10 mg 3 Times Daily PRN 8/16/2023     Route: Oral    diphenhydrAMINE (BENADRYL) injection 25 mg 25 mg Every 6 Hours PRN 8/16/2023     Admin Instructions: 25 mg may be given IV push over less than 1 minute.  Caution: Look alike/sound alike drug alert. This med may be ordered in other forms and routes. Before giving verify the last time the drug was given by any route/form.      Route: Intravenous    Enoxaparin Sodium (LOVENOX) syringe 40 mg 40 mg Every 24 Hours Scheduled 8/16/2023     Admin Instructions: Give subcutaneous in abdomen only. Do not massage site after injection.    Route: Subcutaneous    HYDROcodone-acetaminophen (NORCO)  MG per tablet 1 tablet 1 tablet Every 6 Hours PRN 8/18/2023 8/25/2023    Admin Instructions: Based on patient request - if ordered for moderate or severe pain, provider allows for administration of a medication prescribed for a lower pain scale.  [AKASH]    Do not exceed 4 grams of acetaminophen in a 24 hr period. Max dose of 2gm for AST/ALT greater than 120 units/L        If given for pain, use the following pain scale:   Mild Pain = Pain Score of 1-3, CPOT 1-2  Moderate Pain = Pain Score of 4-6, CPOT 3-4  Severe Pain = Pain Score of 7-10, CPOT 5-8    Route: Oral    HYDROcodone-acetaminophen (NORCO) 5-325 MG per tablet 1 tablet 1 tablet Every 6 Hours PRN 8/16/2023     Admin Instructions: Based  on patient request - if ordered for moderate or severe pain, provider allows for administration of a medication prescribed for a lower pain scale.  [AKASH]    Do not exceed 4 grams of acetaminophen in a 24 hr period. Max dose of 2gm for AST/ALT greater than 120 units/L        If given for pain, use the following pain scale:   Mild Pain = Pain Score of 1-3, CPOT 1-2  Moderate Pain = Pain Score of 4-6, CPOT 3-4  Severe Pain = Pain Score of 7-10, CPOT 5-8    Route: Oral    hydrocortisone 1 % cream 1 application  1 application  Every 12 Hours Scheduled 8/17/2023     Admin Instructions: Apply to rash on thighs and buttock      Route: Topical    labetalol (NORMODYNE,TRANDATE) injection 10 mg 10 mg Every 6 Hours PRN 8/16/2023     Admin Instructions: As needed for SBP greater than 150  Give IV Push over 2 minutes.    Route: Intravenous    lisinopril (PRINIVIL,ZESTRIL) tablet 10 mg 10 mg Every 24 Hours Scheduled 8/17/2023     Admin Instructions: Hold for SBP less than 100, DBP less than 60    Route: Oral    Naloxegol Oxalate (MOVANTIK) tablet 25 mg 25 mg Daily 8/17/2023     Route: Oral    nicotine (NICODERM CQ) 14 MG/24HR patch 1 patch 1 patch Every 24 Hours Scheduled 8/18/2023     Admin Instructions: Apply to clean, dry, nonhairy area of skin (typically upper arm or shoulder)  Dispose of nicotine replacement therapies and their wrappers in non-hazardous pharmaceutical waste or in regular trash.    Route: Transdermal    ondansetron (ZOFRAN) injection 4 mg 4 mg Every 6 Hours PRN 8/16/2023     Admin Instructions: If BOTH ondansetron (ZOFRAN) and promethazine (PHENERGAN) are ordered use ondansetron first and THEN promethazine IF ondansetron is ineffective.    Route: Intravenous    polyethylene glycol (MIRALAX) packet 17 g 17 g Daily 8/17/2023     Admin Instructions: Use 4-8 ounces of water, tea, or juice for each 17 gram dose.    Route: Oral    sennosides-docusate (PERICOLACE) 8.6-50 MG per tablet 2 tablet 2 tablet Nightly  "8/17/2023     Route: Oral    sodium chloride 0.9 % flush 10 mL 10 mL As Needed 8/16/2023     Route: Intravenous    Cosign for Ordering: Accepted by Rocío Mendoza MD on 8/16/2023  1:14 PM    Linked Group 1: See [facility] for full Linked Orders Report.        sodium chloride 0.9 % flush 10 mL 10 mL Every 12 Hours Scheduled 8/16/2023     Route: Intravenous    sodium chloride 0.9 % infusion 40 mL 40 mL As Needed 8/16/2023     Admin Instructions: Following administration of an IV intermittent medication, flush line with 40mL NS at 100mL/hr.    Route: Intravenous    sodium chloride 0.9 % infusion 75 mL/hr Continuous 8/16/2023     Route: Intravenous            Review of Systems   Constitutional: Negative.    HENT: Negative.     Eyes: Negative.    Respiratory: Negative.     Cardiovascular: Negative.    Gastrointestinal:  Positive for abdominal distention and abdominal pain.   Endocrine: Negative.    Genitourinary: Negative.    Musculoskeletal: Negative.    Skin: Negative.    Allergic/Immunologic: Negative.    Neurological: Negative.    Hematological: Negative.    Psychiatric/Behavioral: Negative.     All other systems reviewed and are negative.    /87 (BP Location: Left arm, Patient Position: Lying)   Pulse 75   Temp 98.2 øF (36.8 øC) (Oral)   Resp 16   Ht 175.3 cm (69\")   Wt 58.4 kg (128 lb 12.8 oz)   SpO2 98%   BMI 19.02 kg/mý     Physical Exam  Vitals and nursing note reviewed.   Constitutional:       Appearance: She is well-developed.   HENT:      Head: Normocephalic and atraumatic.   Eyes:      General: No scleral icterus.     Pupils: Pupils are equal, round, and reactive to light.   Neck:      Thyroid: No thyromegaly.      Vascular: No carotid bruit or JVD.   Cardiovascular:      Rate and Rhythm: Normal rate and regular rhythm.      Pulses:           Carotid pulses are 2+ on the right side and 2+ on the left side.       Femoral pulses are 2+ on the right side and 2+ on the left side.       Popliteal " pulses are 2+ on the right side and 2+ on the left side.        Dorsalis pedis pulses are 2+ on the right side and 2+ on the left side.        Posterior tibial pulses are 2+ on the right side and 2+ on the left side.      Heart sounds: Normal heart sounds.   Pulmonary:      Effort: Pulmonary effort is normal.      Breath sounds: Normal breath sounds.   Abdominal:      General: Bowel sounds are normal. There is distension. There is no abdominal bruit.      Palpations: Abdomen is soft. There is no mass.      Tenderness: There is abdominal tenderness.   Musculoskeletal:         General: Normal range of motion.      Cervical back: Neck supple.   Lymphadenopathy:      Cervical: No cervical adenopathy.   Skin:     General: Skin is warm and dry.   Neurological:      Mental Status: She is alert and oriented to person, place, and time.      Cranial Nerves: No cranial nerve deficit.      Sensory: No sensory deficit.       Laboratory Data:  Results from last 7 days   Lab Units 08/18/23  0643 08/17/23  0411 08/16/23  1147   WBC 10*3/mm3 13.46* 19.54* 27.17*   HEMOGLOBIN g/dL 14.7 14.1 15.2   HEMATOCRIT % 44.3 41.4 43.5   PLATELETS 10*3/mm3 206 211 233       Results from last 7 days   Lab Units 08/18/23  0643 08/17/23  0411 08/16/23  1147   SODIUM mmol/L 134* 133* 130*   POTASSIUM mmol/L 3.6 3.9 3.8   CHLORIDE mmol/L 101 98 92*   CO2 mmol/L 24.0 21.0* 24.0   BUN mg/dL 15 19 20   CREATININE mg/dL 0.69 0.65 0.77   CALCIUM mg/dL 8.8 9.5 10.5   BILIRUBIN mg/dL 1.0 0.9 1.5*   ALK PHOS U/L 59 64 75   ALT (SGPT) U/L 21 21 27   AST (SGOT) U/L 16 18 25   GLUCOSE mg/dL 86 138* 127*     Results from last 7 days   Lab Units 08/16/23  1244   INR  0.96   APTT seconds 27.0         Diagnostic Data:  CT Lumbar Spine Without Contrast    Result Date: 8/16/2023  Narrative: CT LUMBAR SPINE WO CONTRAST- 8/16/2023 12:41 PM CDT  HISTORY: worsening low back pain  COMPARISON: None  DOSE LENGTH PRODUCT: 235 mGy cm. Automated exposure control was also  utilized to decrease patient radiation dose.  TECHNIQUE: Axial images lumbar spine obtained with sagittal coronal reconstructions  FINDINGS:  The alignment of lumbar spine is appropriate. There is osteopenia. There is advanced degenerative disc change along the right L4/L5 disc with a slight left convexity of the lumbar curvature measuring 10 degrees with apex at the L4 level. There is a prominent right L5 transverse process articulates with the sacrum. There is no compression deformity or acute lumbar vertebral fracture. There is moderate lower lumbar facet arthropathy.  At L3/L4, there is mild broad-based disc bulging with mild to moderate facet arthropathy. Mild to moderate central canal stenosis with no prominent foraminal narrowing.  At L4/L5, there is posterior endplate spurring and disc bulging with a right foraminal predominance with moderate right mild left facet arthropathy. There is moderate central canal stenosis with moderate right foraminal narrowing.  At L5-S1, no prominent central canal stenosis. There is moderate left foraminal narrowing related to posterior lateral endplate spurring and mild to moderate facet arthropathy.  Mild to moderate vascular calcification.      Impression: 1. Osteopenia with mild left convexity of the degenerative lumbar spine. There is moderate central canal stenosis at the L4/L5 level with moderate right L4/L5 and left L5-S1 foraminal narrowing as described above. No acute lumbar vertebral fracture or traumatic malalignment. This report was finalized on 08/16/2023 13:00 by Dr. Kylie Toro MD.    CT Angiogram Abdomen Pelvis    Result Date: 8/9/2023  Narrative: CT ANGIOGRAM ABDOMEN PELVIS- 8/9/2023 1:19 PM CDT  HISTORY: r/o dissection/sudden onset abd and chest pain with radiation into back  COMPARISON: CT abdomen and pelvis 9/4/2018.  DOSE LENGTH PRODUCT: 265 mGy cm. Automated exposure control was also utilized to decrease patient radiation dose.  TECHNIQUE: Helical  tomographic images of the abdomen and pelvis utilizing angiographic protocol were obtained following the intravenous infusion of contrast. Multiplanar and 3 D reformatted images were provided for review.  FINDINGS:  Lower chest: Please see separate dictated report.  Liver: No arterially enhancing lesion.  No other system: Unremarkable.  Spleen: Unremarkable.  Pancreas: Unremarkable.  Adrenal glands: Unremarkable.  Kidneys/ureters/bladder: Mildly distended bladder.  Reproductive organs: Status post hysterectomy. No adnexal masses.  GI system: Borderline circumferential wall thickening of the transverse colon which is underdistended. No significant adjacent inflammatory changes.  Peritoneum/retroperitoneum: Unremarkable.  Lymph nodes: Unremarkable.  Vasculature: Moderate calcified atherosclerosis of the aorta and branching vessels. No aneurysm. No dissection. Celiac axis, SMA, and JEFE are patent. 2 right and 2 left renal arteries which are patent.  Soft tissues: Unremarkable.  Osseous structures: No acute or suspicious osseous finding. Degenerative disc disease.      Impression:  No aortic aneurysm or dissection.  No definite acute findings in the abdomen/pelvis.  Mild circumferential thickening of the transverse colon which may be due to partially collapsed state. Correlate for mild colitis.  Mildly distended bladder.  This report was finalized on 08/09/2023 13:53 by  Parminder Sanchez DO.    CT Abdomen Pelvis With Contrast    Result Date: 8/16/2023  Narrative: EXAM/TECHNIQUE: CT abdomen pelvis with IV contrast  INDICATION: Abdominal pain, constipation, vomiting  COMPARISON: 08/09/2023  DLP: 218 mGy cm. Automated exposure control was also utilized to decrease patient radiation dose.  FINDINGS:  The lung bases are clear.  No suspicious focal liver lesion. No gallstones or gallbladder wall thickening. No biliary ductal dilatation. Spleen, pancreas, and adrenal glands are unremarkable. No solid renal mass. No urolithiasis  or hydronephrosis. No focal urinary bladder wall thickening.  No colonic wall thickening or pericolonic fat stranding. Normal appendix. Moderate volume stool throughout the colon. No small bowel distention or evidence of active small bowel inflammation.  No ascites or free pelvic fluid. No pelvic mass or pelvic collection. Prior hysterectomy.  Nonaneurysmal atherosclerotic abdominal aorta. There is new eccentric wall thickening along the anterior abdominal aorta beginning at the mid abdominal aorta at the level of the renal vein, extending to the distal abdominal aorta and into the RIGHT common iliac artery, highly concerning for new intramural hematoma. This area of thickening measures up to 6 mm thickness. No aortic dissection flap identified. No penetrating atherosclerotic ulcer identified. The celiac artery SMA, and JEFE are widely patent. Renal arteries are widely patent.  No enlarged retroperitoneal, mesenteric, pelvic, or inguinal lymph nodes.  No acute abdominal wall soft tissue abnormality. Multilevel lumbar spine degenerative change.      Impression:  1.  New eccentric thickening along the anterior abdominal aorta beginning at the mid abdominal aorta and extending distally into the RIGHT common iliac artery. This is favored to represent an acute intramural hematoma. Of note, this finding was not present on a CT performed recently on 08/09/2023.  2.  Moderate volume stool throughout the colon. Correlate for constipation.  --- These findings were discussed with SRIDHAR SHARMA on 8/16/2023 1:18 PM CDT by Dr. Chris Us. This report was finalized on 08/16/2023 13:21 by Dr. Chris Us MD.    XR Chest 1 View    Result Date: 8/9/2023  Narrative: EXAMINATION:  XR CHEST 1 VW-  8/9/2023 12:15 PM CDT  HISTORY: Shortness of air. Chest tightness.  COMPARISON: 01/11/2022.  TECHNIQUE: Single view AP image.  FINDINGS:  The lungs are expanded bilaterally and clear. There is stable mild bronchial wall thickening. The  pleural spaces are clear. Heart size is normal. No acute bony abnormality is seen.       Impression: No active disease is seen.   This report was finalized on 08/09/2023 12:36 by Dr. Adi Christine MD.    CT Angiogram Chest    Result Date: 8/16/2023  Narrative: CT ANGIOGRAM CHEST- 8/16/2023 1:40 PM CDT  HISTORY: abnormal abd CT, r/o dissection  COMPARISON: CT angiogram dated 08/09/2023.  DOSE LENGTH PRODUCT: 218 mGy cm. Automated exposure control was also utilized to decrease patient radiation dose.  TECHNIQUE: Helical tomographic images of the chest were obtained after the administration of intravenous contrast following angiogram protocol. Additionally, 3D and multiplanar reformatted images were provided.    FINDINGS:  Pulmonary arteries: There is adequate enhancement of the pulmonary arteries to evaluate for central and segmental pulmonary emboli. There are no filling defects within the main, lobar, segmental or visualized subsegmental pulmonary arteries. The pulmonary arteries are within normal limits for size.  Aorta and great vessels: Thoracic aorta is normal in caliber. No dissection identified. No flow-limiting stenosis identified at the great vessel origins. No intramural hematoma.  Visualized neck base: The imaged portion of the base of the neck appears unremarkable.  Lungs: Paraseptal emphysema. Lungs are clear and well expanded. No pleural effusion. Airways are clear.  Heart: The heart is normal in size. There is no pericardial effusion. Mild coronary atheromatous calcification.  Mediastinum and lymph nodes: No suspicious hilar or mediastinal adenopathy.  Skeletal and soft tissues: Chest wall soft tissues are unremarkable. Osteopenia. Exaggerated thoracic kyphosis. Vertebral body heights are well-maintained.       Impression: 1. No acute aortic pathology or other acute cardiopulmonary process. Thoracic aorta is nondilated. 2. Paraseptal emphysema.   This report was finalized on 08/16/2023 14:16 by   Brett Reyes, .    CT Angiogram Chest    Result Date: 8/9/2023  Narrative: CT ANGIOGRAM CHEST- 8/9/2023 1:19 PM CDT  HISTORY: r/o dissection/ sudden onset dyspnea/chest pressure/ radiating into back  COMPARISON: CT abdomen and pelvis 9/4/2018.  DOSE LENGTH PRODUCT: 265 mGy cm. Automated exposure control was also utilized to decrease patient radiation dose.  TECHNIQUE: Serial helical tomographic images of the chest were acquired following the intravenous infusion of contrast according to angiographic protocol. Multiplanar reformatted images were provided for review.  FINDINGS:  Central airways: Patent.  LUNGS: Scarring/atelectasis in the right lung base. Mild paraseptal emphysema changes.  Pleura: No pleural effusion or pneumothorax.  Lower neck: Unremarkable.  Mediastinum/hilum: No enlarged lymph adenopathy.  HEART: Normal in size. No pericardial effusion.  Vasculature: The aorta is normal in caliber. No dissection. Main pulmonary artery is normal in caliber. No pulmonary artery filling defect.  Soft tissues: Unremarkable.  Upper abdomen: Please see separately dictated report.  Osseous structures: No acute or suspicious osseous finding.      Impression:  No aneurysm, dissection, or pulmonary embolism.  No acute findings in the chest.   This report was finalized on 08/09/2023 13:46 by  Parminder Sanchez DO.         Impression:    Hypertensive urgency    Back pain    Tobacco use    Abnormal CT of the abdomen - ? intramural hematoma from aorta to R iliac    Constipation      Plan: After thoroughly evaluating Sol Fontenot, I believe the best course of action is to remain conservative from a vascular surgery standpoint.  She does have what appears to be a small intramural hematoma on her infrarenal aorta extending into the right common iliac artery.  She will need adequate blood pressure control and pain control.  She also needs laxatives so she can have a bowel movement.  We can recheck a CAT scan in 3 to 5 days.  The  intramural hematoma will likely resolve on its own in the next 3 to 6 months.  This was all discussed in full with complete understanding..  Thank you for allowing me to see Sol Fontenot in consult. Please feel free to reach out with any questions or concerns.    Cliff Lundberg,   8/18/2023  11:45 CDT

## 2023-08-18 NOTE — PLAN OF CARE
Goal Outcome Evaluation:  Plan of Care Reviewed With: patient        Progress: no change  Outcome Evaluation: Patient continues to have constipation despite suppository and laxative. Also continues to have back pain and requests opioid pain medicine. Vitals WNL.

## 2023-08-18 NOTE — PLAN OF CARE
Goal Outcome Evaluation:              Outcome Evaluation: NTN follow up. CLD Day 2. Tolerating liquids. Not eager to advance diet. No BM since yesterday, which was very small. Will continue to monitor while inpatient. NTN following per protocol.

## 2023-08-18 NOTE — PROGRESS NOTES
[facility] Medicine Services  INPATIENT PROGRESS NOTE    Patient Name: Sol Fontenot  Date of Admission: 8/16/2023  Today's Date: 08/18/23  Length of Stay: 0  Primary Care Physician: Chip Hillman DO    Subjective   Chief Complaint: Mild back pain  HPI   Patient examined lying in bed on room air, no acute distress, denies chest pain or shortness of breath.  She endorses mild low back pain.  She tells me this is improved from yesterday.  She also tells me her abdominal fullness is improved.  She experienced abdominal cramping yesterday but was able to have a few small bowel movements after bowel regimen and multiple enema administrations.  Discussion regarding further bowel regimen and hopeful bowel movements today for further relief.  Otherwise, awaiting vascular surgery opinion regarding CT scan findings of aorta and iliac artery.  Patient agreeable to her plan of care.    Review of Systems   All pertinent negatives and positives are as above. All other systems have been reviewed and are negative unless otherwise stated.     Objective    Temp:  [97.8 øF (36.6 øC)-98.4 øF (36.9 øC)] 98.2 øF (36.8 øC)  Heart Rate:  [63-75] 75  Resp:  [16-18] 16  BP: (125-157)/(65-98) 157/87  Physical Exam  Vitals and nursing note reviewed.   Constitutional:       Comments: Up in bed, no acute distress, room air, no visitors at bedside   HENT:      Head: Normocephalic and atraumatic.   Cardiovascular:      Rate and Rhythm: Normal rate and regular rhythm.      Pulses: Normal pulses.      Heart sounds: Normal heart sounds. No murmur heard.    No friction rub.   Pulmonary:      Effort: Pulmonary effort is normal. No respiratory distress.      Breath sounds: Normal breath sounds. No stridor. No wheezing or rhonchi.   Abdominal:      General: Bowel sounds are normal. There is no distension.      Palpations: Abdomen is soft.      Comments: No increased tenderness to palpation   Musculoskeletal:          General: No swelling or deformity. Normal range of motion.      Cervical back: Normal range of motion and neck supple. No rigidity or tenderness.      Right lower leg: No edema.      Left lower leg: No edema.   Skin:     General: Skin is warm and dry.      Capillary Refill: Capillary refill takes less than 2 seconds.      Coloration: Skin is not pale.      Findings: No bruising or erythema.   Neurological:      General: No focal deficit present.      Mental Status: She is alert and oriented to person, place, and time. Mental status is at baseline.      Sensory: No sensory deficit.      Gait: Gait normal.   Psychiatric:         Mood and Affect: Mood normal.         Behavior: Behavior normal.         Thought Content: Thought content normal.         Judgment: Judgment normal.     Results Review:  I have reviewed the labs, radiology results, and diagnostic studies.    Laboratory Data:   Results from last 7 days   Lab Units 08/18/23  0643 08/17/23  0411 08/16/23  1147   WBC 10*3/mm3 13.46* 19.54* 27.17*   HEMOGLOBIN g/dL 14.7 14.1 15.2   HEMATOCRIT % 44.3 41.4 43.5   PLATELETS 10*3/mm3 206 211 233          Results from last 7 days   Lab Units 08/18/23  0643 08/17/23  0411 08/16/23  1147   SODIUM mmol/L 134* 133* 130*   POTASSIUM mmol/L 3.6 3.9 3.8   CHLORIDE mmol/L 101 98 92*   CO2 mmol/L 24.0 21.0* 24.0   BUN mg/dL 15 19 20   CREATININE mg/dL 0.69 0.65 0.77   CALCIUM mg/dL 8.8 9.5 10.5   BILIRUBIN mg/dL 1.0 0.9 1.5*   ALK PHOS U/L 59 64 75   ALT (SGPT) U/L 21 21 27   AST (SGOT) U/L 16 18 25   GLUCOSE mg/dL 86 138* 127*       I have reviewed the patient's current medications.     Assessment/Plan   Assessment  Active Hospital Problems    Diagnosis     **Hypertensive urgency     Back pain     Tobacco use     Abnormal CT of the abdomen - ? intramural hematoma from aorta to R iliac     Constipation        Treatment Plan  Hypertensive urgency-  Patient hypertensive upon arrival.  As needed antihypertensives administered  in the emergency department to gain blood pressure control.  Amlodipine initiated on admission and increased to 10 mg this morning.  As needed labetalol.  Blood pressure trend overall improved    Abnormal CT of the abdomen-  Radiologist report favors acute intramural hematoma from the mid abdominal aorta extending distally into the right common iliac artery.  This finding was not present on CTA abdomen pelvis from 8/9.  Vascular surgery consulted.    Back pain-  Patient has chronic back pain and takes Flexeril and Norco at home.  CT lumbar spine shows no acute fracture.  It does show degenerative changes and stenosis.  Unsure if increasing baseline back pain could be related to vascular findings.  Continue home medications of Flexeril and Norco.  As needed Tylenol.    Constipation-  Patient reports no bowel movement for 8 days  CT abdomen suggest constipation  Bowel regimen with Dulcolax suppository, MiraLAX, Vi-Colace, enemas.  Movantik continued.    Leukocytosis continues to improve, no obvious signs or symptoms of infection.  Potentially related to dehydration or stress reaction.    Lovenox for DVT prophylaxis    Medical Decision Making  Number and Complexity of problems: 1 acute problem of high complexity in abnormal CT of the abdomen suggesting potential intramural hematoma.  1 acute problem of high complexity in hypertensive urgency.  1 acute problem of moderate complexity and back pain.  1 acute problem of moderate complexity and constipation.  Differential Diagnosis: Etiology of CT abdomen findings    Conditions and Status  Condition stable at this time     MDM Data  External documents reviewed: None  Cardiac tracing (EKG, telemetry) interpretation: EKG reviewed  Radiology interpretation: CTA chest, CT abdomen pelvis with contrast reviewed  Labs reviewed: CBC, BMP, urinalysis  Any tests that were considered but not ordered: None     Decision rules/scores evaluated (example [id number], Wells, etc): None      Discussed with: Patient and Dr. Reynoso     Care Planning  Shared decision making: Discussed with above, patient agreeable to plan of care  Code status and discussions: Full    Disposition  Social Determinants of Health that impact treatment or disposition: None  I expect the patient to likely eventually be discharged home, timeline undeterminable at this time    Electronically signed by KYRA Evans, 08/18/23, 11:44 CDT.

## 2023-08-19 LAB
DEPRECATED RDW RBC AUTO: 45.2 FL (ref 37–54)
ERYTHROCYTE [DISTWIDTH] IN BLOOD BY AUTOMATED COUNT: 14.4 % (ref 12.3–15.4)
HCT VFR BLD AUTO: 42.2 % (ref 34–46.6)
HGB BLD-MCNC: 14 G/DL (ref 12–15.9)
MCH RBC QN AUTO: 29.5 PG (ref 26.6–33)
MCHC RBC AUTO-ENTMCNC: 33.2 G/DL (ref 31.5–35.7)
MCV RBC AUTO: 88.8 FL (ref 79–97)
PLATELET # BLD AUTO: 204 10*3/MM3 (ref 140–450)
PMV BLD AUTO: 10.8 FL (ref 6–12)
RBC # BLD AUTO: 4.75 10*6/MM3 (ref 3.77–5.28)
WBC NRBC COR # BLD: 12.37 10*3/MM3 (ref 3.4–10.8)

## 2023-08-19 PROCEDURE — G0378 HOSPITAL OBSERVATION PER HR: HCPCS

## 2023-08-19 PROCEDURE — 96375 TX/PRO/DX INJ NEW DRUG ADDON: CPT

## 2023-08-19 PROCEDURE — 96376 TX/PRO/DX INJ SAME DRUG ADON: CPT

## 2023-08-19 PROCEDURE — 85027 COMPLETE CBC AUTOMATED: CPT

## 2023-08-19 PROCEDURE — 25010000002 ENOXAPARIN PER 10 MG: Performed by: INTERNAL MEDICINE

## 2023-08-19 PROCEDURE — 96372 THER/PROPH/DIAG INJ SC/IM: CPT

## 2023-08-19 RX ORDER — BISACODYL 10 MG
10 SUPPOSITORY, RECTAL RECTAL 2 TIMES DAILY
Status: DISCONTINUED | OUTPATIENT
Start: 2023-08-19 | End: 2023-08-21 | Stop reason: HOSPADM

## 2023-08-19 RX ORDER — FAMOTIDINE 10 MG/ML
20 INJECTION, SOLUTION INTRAVENOUS 2 TIMES DAILY
Status: DISCONTINUED | OUTPATIENT
Start: 2023-08-19 | End: 2023-08-21 | Stop reason: HOSPADM

## 2023-08-19 RX ADMIN — BISACODYL 10 MG: 10 SUPPOSITORY RECTAL at 09:24

## 2023-08-19 RX ADMIN — HYDROCODONE BITARTRATE AND ACETAMINOPHEN 1 TABLET: 10; 325 TABLET ORAL at 20:06

## 2023-08-19 RX ADMIN — FAMOTIDINE 20 MG: 10 INJECTION INTRAVENOUS at 20:05

## 2023-08-19 RX ADMIN — DOCUSATE SODIUM 50 MG AND SENNOSIDES 8.6 MG 2 TABLET: 8.6; 5 TABLET, FILM COATED ORAL at 20:06

## 2023-08-19 RX ADMIN — ENOXAPARIN SODIUM 40 MG: 100 INJECTION SUBCUTANEOUS at 21:06

## 2023-08-19 RX ADMIN — CYCLOBENZAPRINE 10 MG: 10 TABLET, FILM COATED ORAL at 17:25

## 2023-08-19 RX ADMIN — BISACODYL 10 MG: 10 SUPPOSITORY RECTAL at 20:06

## 2023-08-19 RX ADMIN — HYDROCORTISONE 1 APPLICATION: 1 CREAM TOPICAL at 20:06

## 2023-08-19 RX ADMIN — HYDROCORTISONE 1 APPLICATION: 1 CREAM TOPICAL at 09:24

## 2023-08-19 RX ADMIN — LISINOPRIL 10 MG: 10 TABLET ORAL at 09:19

## 2023-08-19 RX ADMIN — Medication 10 ML: at 20:07

## 2023-08-19 RX ADMIN — HYDROCODONE BITARTRATE AND ACETAMINOPHEN 1 TABLET: 10; 325 TABLET ORAL at 07:28

## 2023-08-19 RX ADMIN — HYDROCODONE BITARTRATE AND ACETAMINOPHEN 1 TABLET: 10; 325 TABLET ORAL at 00:52

## 2023-08-19 RX ADMIN — NICOTINE 1 PATCH: 14 PATCH, EXTENDED RELEASE TRANSDERMAL at 09:19

## 2023-08-19 RX ADMIN — NALOXEGOL OXALATE 25 MG: 25 TABLET, FILM COATED ORAL at 09:18

## 2023-08-19 RX ADMIN — FAMOTIDINE 20 MG: 10 INJECTION INTRAVENOUS at 12:22

## 2023-08-19 RX ADMIN — POLYETHYLENE GLYCOL 3350 17 G: 17 POWDER, FOR SOLUTION ORAL at 09:19

## 2023-08-19 RX ADMIN — AMLODIPINE BESYLATE 10 MG: 10 TABLET ORAL at 09:19

## 2023-08-19 RX ADMIN — SODIUM CHLORIDE 75 ML/HR: 9 INJECTION, SOLUTION INTRAVENOUS at 17:25

## 2023-08-19 RX ADMIN — CYCLOBENZAPRINE 10 MG: 10 TABLET, FILM COATED ORAL at 09:24

## 2023-08-19 RX ADMIN — Medication 10 ML: at 09:18

## 2023-08-19 RX ADMIN — SODIUM CHLORIDE 75 ML/HR: 9 INJECTION, SOLUTION INTRAVENOUS at 00:53

## 2023-08-19 NOTE — PLAN OF CARE
Goal Outcome Evaluation:  Plan of Care Reviewed With: patient        Progress: no change  Outcome Evaluation: SR/SA on telemetry throughout shift. patient needed PRN pain medications d/t pain in back and abdomen. no bowel movement on nightshift, but active bowel sounds and patient passing gas. NS at 75 infusing. up to BSC independently. BP WNL. monitoring closely.       SR/SA (70-98)

## 2023-08-19 NOTE — PROGRESS NOTES
[facility] Medicine Services  INPATIENT PROGRESS NOTE    Patient Name: Sol Fontenot  Date of Admission: 8/16/2023  Today's Date: 08/19/23  Length of Stay: 0  Primary Care Physician: Chip Hillman DO    Subjective   Chief Complaint: Abdomen pain/constipation/sciatica.  HPI   Small/liquid stool for now, at milk molasses enema daily and increase Dulcolax suppository twice daily otherwise continue bowel regimen.  Leukocytosis improving.  Conservative management per vascular surgery.    Review of Systems   Constitutional:  Positive for activity change, appetite change and fatigue. Negative for chills and fever.   HENT:  Negative for hearing loss, nosebleeds, tinnitus and trouble swallowing.    Eyes:  Negative for visual disturbance.   Respiratory:  Negative for cough, chest tightness, shortness of breath and wheezing.    Cardiovascular:  Negative for chest pain, palpitations and leg swelling.   Gastrointestinal:  Negative for abdominal distention, abdominal pain, blood in stool, constipation, diarrhea, nausea and vomiting.   Endocrine: Negative for cold intolerance, heat intolerance, polydipsia, polyphagia and polyuria.   Genitourinary:  Negative for decreased urine volume, difficulty urinating, dysuria, flank pain, frequency and hematuria.   Musculoskeletal:  Positive for arthralgias, gait problem and myalgias. Negative for joint swelling.   Skin:  Negative for rash.   Allergic/Immunologic: Negative for immunocompromised state.   Neurological:  Positive for weakness. Negative for dizziness, syncope, light-headedness and headaches.   Hematological:  Negative for adenopathy. Does not bruise/bleed easily.   Psychiatric/Behavioral:  Negative for confusion and sleep disturbance. The patient is not nervous/anxious.       All pertinent negatives and positives are as above. All other systems have been reviewed and are negative unless otherwise stated.     Objective    Temp:  [97.6  øF (36.4 øC)-98.4 øF (36.9 øC)] 97.6 øF (36.4 øC)  Heart Rate:  [75-82] 82  Resp:  [16-22] 22  BP: (130-157)/(77-87) 142/77  Physical Exam  Vitals and nursing note reviewed.   Constitutional:       Comments: Cachectic.  Chronically ill.   HENT:      Head: Normocephalic.   Eyes:      Conjunctiva/sclera: Conjunctivae normal.      Pupils: Pupils are equal, round, and reactive to light.   Neck:      Vascular: No JVD.   Cardiovascular:      Rate and Rhythm: Normal rate and regular rhythm.      Heart sounds: Normal heart sounds.   Pulmonary:      Effort: No respiratory distress.      Breath sounds: No wheezing or rales.      Comments: Diminished breath sound bilateral, clear, on room air.  Chest:      Chest wall: No tenderness.   Abdominal:      General: Bowel sounds are normal. There is no distension.      Palpations: Abdomen is soft.      Tenderness: There is no abdominal tenderness.   Musculoskeletal:         General: No swelling.      Cervical back: Neck supple.   Skin:     General: Skin is warm and dry.      Capillary Refill: Capillary refill takes 2 to 3 seconds.      Findings: No rash.   Neurological:      Mental Status: She is alert and oriented to person, place, and time.      Motor: Weakness present.      Coordination: Coordination abnormal.      Gait: Gait abnormal.      Deep Tendon Reflexes: Reflexes normal.   Psychiatric:         Mood and Affect: Mood normal.         Behavior: Behavior normal.         Thought Content: Thought content normal.           Results Review:  I have reviewed the labs, radiology results, and diagnostic studies.    Laboratory Data:   Results from last 7 days   Lab Units 08/19/23  0518 08/18/23  0643 08/17/23  0411   WBC 10*3/mm3 12.37* 13.46* 19.54*   HEMOGLOBIN g/dL 14.0 14.7 14.1   HEMATOCRIT % 42.2 44.3 41.4   PLATELETS 10*3/mm3 204 206 211        Results from last 7 days   Lab Units 08/18/23  0643 08/17/23  0411 08/16/23  1147   SODIUM mmol/L 134* 133* 130*   POTASSIUM mmol/L 3.6  3.9 3.8   CHLORIDE mmol/L 101 98 92*   CO2 mmol/L 24.0 21.0* 24.0   BUN mg/dL 15 19 20   CREATININE mg/dL 0.69 0.65 0.77   CALCIUM mg/dL 8.8 9.5 10.5   BILIRUBIN mg/dL 1.0 0.9 1.5*   ALK PHOS U/L 59 64 75   ALT (SGPT) U/L 21 21 27   AST (SGOT) U/L 16 18 25   GLUCOSE mg/dL 86 138* 127*       Culture Data:   No results found for: BLOODCX, URINECX, WOUNDCX, MRSACX, RESPCX, STOOLCX    Radiology Data:   Imaging Results (Last 24 Hours)       ** No results found for the last 24 hours. **            I have reviewed the patient's current medications.     Assessment/Plan   Assessment  Active Hospital Problems    Diagnosis     **Hypertensive urgency     Back pain     Tobacco use     Abnormal CT of the abdomen - ? intramural hematoma from aorta to R iliac     Constipation        Treatment Plan  Hypertensive urgency-blood pressures improving.  Patient hypertensive upon arrival.  As needed antihypertensives administered in the emergency department to gain blood pressure control.  Amlodipine initiated on admission and increased to 10 mg this morning.  As needed labetalol.    Abnormal CT of the abdomen-Small intramural hematoma on her in for renal aortic extending to the right common iliac arteries.  Radiologist report favors acute intramural hematoma from the mid abdominal aorta extending distally into the right common iliac artery.  This finding was not present on CTA abdomen pelvis from 8/9.  Vascular surgery recommendation-conservative management for now.  Small intramural hematoma on her in for renal aortic extending to the right common iliac arteries.    COPD.  PNR albuterol nebs.  Patient is on room air.     Back pain-  Patient has chronic back pain and takes Flexeril and Norco at home.  CT lumbar spine shows no acute fracture.  It does show degenerative changes and stenosis.  Unsure if increasing baseline back pain could be related to vascular findings.  Continue home medications of Flexeril and Norco.  As needed Tylenol.      Constipation-slow IV hydration.  Patient reports no bowel movement for 8 days  CT abdomen suggest constipation  Bowel regimen with Dulcolax suppository, MiraLAX, Vi-Colace, milk of molasses enemas. Movantik continued.     Leukocytosis continues to improve, no obvious signs or symptoms of infection.  Potentially related to dehydration or stress reaction.    Nausea . Pepcid.  Zofran as needed.     Lovenox for DVT prophylaxis    Nutrition.  Clear liquid diet.     Medical Decision Making  Number and Complexity of problems: 1 acute problem of high complexity in abnormal CT of the abdomen suggesting potential intramural hematoma.  1 acute problem of high complexity in hypertensive urgency.  1 acute problem of moderate complexity and back pain.  1 acute problem of moderate complexity and constipation.  Differential Diagnosis: Etiology of CT abdomen findings     Conditions and Status  Condition stable at this time     MDM Data  External documents reviewed: None  Cardiac tracing (EKG, telemetry) interpretation: EKG reviewed  Radiology interpretation: CTA chest, CT abdomen pelvis with contrast reviewed  Labs reviewed: CBC, BMP, urinalysis  Any tests that were considered but not ordered: None     Decision rules/scores evaluated (example [id number], Wells, etc): None     Discussed with: Patient.     Care Planning  Shared decision making: Discussed with above, patient agreeable to plan of care  Code status and discussions: Full     Disposition  Social Determinants of Health that impact treatment or disposition: None  I expect the patient to likely eventually be discharged home, timeline undeterminable at this time    Electronically signed by Easton Reynoso MD, 08/19/23, 10:41 CDT.

## 2023-08-19 NOTE — PLAN OF CARE
Goal Outcome Evaluation:  Plan of Care Reviewed With: patient        Progress: improving  Outcome Evaluation: Patient had multiple bowel movements today but still mostly liquid. However, she has been on a liquid diet. We advanced her to a regular diet today. Enema instilled with good results. Patient has had pain meds and muscle relaxers today. Vitals WNL.

## 2023-08-20 LAB
ANION GAP SERPL CALCULATED.3IONS-SCNC: 11 MMOL/L (ref 5–15)
BUN SERPL-MCNC: 10 MG/DL (ref 6–20)
BUN/CREAT SERPL: 16.1 (ref 7–25)
CALCIUM SPEC-SCNC: 8.6 MG/DL (ref 8.6–10.5)
CHLORIDE SERPL-SCNC: 101 MMOL/L (ref 98–107)
CHOLEST SERPL-MCNC: 168 MG/DL (ref 0–200)
CO2 SERPL-SCNC: 23 MMOL/L (ref 22–29)
CREAT SERPL-MCNC: 0.62 MG/DL (ref 0.57–1)
DEPRECATED RDW RBC AUTO: 44.3 FL (ref 37–54)
EGFRCR SERPLBLD CKD-EPI 2021: 104 ML/MIN/1.73
ERYTHROCYTE [DISTWIDTH] IN BLOOD BY AUTOMATED COUNT: 13.8 % (ref 12.3–15.4)
GLUCOSE SERPL-MCNC: 103 MG/DL (ref 65–99)
HBA1C MFR BLD: 5.5 % (ref 4.8–5.6)
HCT VFR BLD AUTO: 42.4 % (ref 34–46.6)
HDLC SERPL-MCNC: 39 MG/DL (ref 40–60)
HGB BLD-MCNC: 14 G/DL (ref 12–15.9)
LDLC SERPL CALC-MCNC: 110 MG/DL (ref 0–100)
LDLC/HDLC SERPL: 2.77 {RATIO}
MAGNESIUM SERPL-MCNC: 1.9 MG/DL (ref 1.6–2.6)
MCH RBC QN AUTO: 29.7 PG (ref 26.6–33)
MCHC RBC AUTO-ENTMCNC: 33 G/DL (ref 31.5–35.7)
MCV RBC AUTO: 89.8 FL (ref 79–97)
PLATELET # BLD AUTO: 214 10*3/MM3 (ref 140–450)
PMV BLD AUTO: 10.6 FL (ref 6–12)
POTASSIUM SERPL-SCNC: 3 MMOL/L (ref 3.5–5.2)
RBC # BLD AUTO: 4.72 10*6/MM3 (ref 3.77–5.28)
SODIUM SERPL-SCNC: 135 MMOL/L (ref 136–145)
TRIGL SERPL-MCNC: 105 MG/DL (ref 0–150)
TSH SERPL DL<=0.05 MIU/L-ACNC: 1.12 UIU/ML (ref 0.27–4.2)
VLDLC SERPL-MCNC: 19 MG/DL (ref 5–40)
WBC NRBC COR # BLD: 11.62 10*3/MM3 (ref 3.4–10.8)

## 2023-08-20 PROCEDURE — 85027 COMPLETE CBC AUTOMATED: CPT | Performed by: FAMILY MEDICINE

## 2023-08-20 PROCEDURE — 97166 OT EVAL MOD COMPLEX 45 MIN: CPT

## 2023-08-20 PROCEDURE — 80061 LIPID PANEL: CPT | Performed by: FAMILY MEDICINE

## 2023-08-20 PROCEDURE — 84443 ASSAY THYROID STIM HORMONE: CPT | Performed by: FAMILY MEDICINE

## 2023-08-20 PROCEDURE — 83036 HEMOGLOBIN GLYCOSYLATED A1C: CPT | Performed by: FAMILY MEDICINE

## 2023-08-20 PROCEDURE — 83735 ASSAY OF MAGNESIUM: CPT | Performed by: FAMILY MEDICINE

## 2023-08-20 PROCEDURE — 25010000002 ENOXAPARIN PER 10 MG: Performed by: INTERNAL MEDICINE

## 2023-08-20 PROCEDURE — 96372 THER/PROPH/DIAG INJ SC/IM: CPT

## 2023-08-20 PROCEDURE — 96376 TX/PRO/DX INJ SAME DRUG ADON: CPT

## 2023-08-20 PROCEDURE — G0378 HOSPITAL OBSERVATION PER HR: HCPCS

## 2023-08-20 PROCEDURE — 80048 BASIC METABOLIC PNL TOTAL CA: CPT | Performed by: FAMILY MEDICINE

## 2023-08-20 RX ORDER — POTASSIUM CHLORIDE 750 MG/1
40 CAPSULE, EXTENDED RELEASE ORAL 2 TIMES DAILY
Status: COMPLETED | OUTPATIENT
Start: 2023-08-20 | End: 2023-08-20

## 2023-08-20 RX ORDER — LIDOCAINE 50 MG/G
2 PATCH TOPICAL
Status: DISCONTINUED | OUTPATIENT
Start: 2023-08-20 | End: 2023-08-21 | Stop reason: HOSPADM

## 2023-08-20 RX ADMIN — SODIUM CHLORIDE 75 ML/HR: 9 INJECTION, SOLUTION INTRAVENOUS at 18:12

## 2023-08-20 RX ADMIN — CYCLOBENZAPRINE 10 MG: 10 TABLET, FILM COATED ORAL at 02:55

## 2023-08-20 RX ADMIN — NALOXEGOL OXALATE 25 MG: 25 TABLET, FILM COATED ORAL at 09:02

## 2023-08-20 RX ADMIN — Medication 10 ML: at 21:37

## 2023-08-20 RX ADMIN — NICOTINE 1 PATCH: 14 PATCH, EXTENDED RELEASE TRANSDERMAL at 09:03

## 2023-08-20 RX ADMIN — AMLODIPINE BESYLATE 10 MG: 10 TABLET ORAL at 09:03

## 2023-08-20 RX ADMIN — POLYETHYLENE GLYCOL 3350 17 G: 17 POWDER, FOR SOLUTION ORAL at 09:02

## 2023-08-20 RX ADMIN — BISACODYL 10 MG: 10 SUPPOSITORY RECTAL at 21:37

## 2023-08-20 RX ADMIN — HYDROCODONE BITARTRATE AND ACETAMINOPHEN 1 TABLET: 10; 325 TABLET ORAL at 19:13

## 2023-08-20 RX ADMIN — HYDROCORTISONE 1 APPLICATION: 1 CREAM TOPICAL at 09:03

## 2023-08-20 RX ADMIN — FAMOTIDINE 20 MG: 10 INJECTION INTRAVENOUS at 21:37

## 2023-08-20 RX ADMIN — HYDROCODONE BITARTRATE AND ACETAMINOPHEN 1 TABLET: 10; 325 TABLET ORAL at 11:41

## 2023-08-20 RX ADMIN — POTASSIUM CHLORIDE 40 MEQ: 10 CAPSULE, COATED, EXTENDED RELEASE ORAL at 09:07

## 2023-08-20 RX ADMIN — LIDOCAINE 2 PATCH: 50 PATCH CUTANEOUS at 13:28

## 2023-08-20 RX ADMIN — FAMOTIDINE 20 MG: 10 INJECTION INTRAVENOUS at 09:07

## 2023-08-20 RX ADMIN — HYDROCODONE BITARTRATE AND ACETAMINOPHEN 1 TABLET: 10; 325 TABLET ORAL at 05:12

## 2023-08-20 RX ADMIN — DOCUSATE SODIUM 50 MG AND SENNOSIDES 8.6 MG 2 TABLET: 8.6; 5 TABLET, FILM COATED ORAL at 21:36

## 2023-08-20 RX ADMIN — BISACODYL 10 MG: 10 SUPPOSITORY RECTAL at 09:07

## 2023-08-20 RX ADMIN — LISINOPRIL 10 MG: 10 TABLET ORAL at 09:03

## 2023-08-20 RX ADMIN — CYCLOBENZAPRINE 10 MG: 10 TABLET, FILM COATED ORAL at 14:19

## 2023-08-20 RX ADMIN — Medication 10 ML: at 09:02

## 2023-08-20 RX ADMIN — ENOXAPARIN SODIUM 40 MG: 100 INJECTION SUBCUTANEOUS at 21:37

## 2023-08-20 RX ADMIN — POTASSIUM CHLORIDE 40 MEQ: 10 CAPSULE, COATED, EXTENDED RELEASE ORAL at 21:37

## 2023-08-20 NOTE — PROGRESS NOTES
[facility] Medicine Services  INPATIENT PROGRESS NOTE    Patient Name: Sol Fontenot  Date of Admission: 8/16/2023  Today's Date: 08/20/23  Length of Stay: 0  Primary Care Physician: Chip Hillman DO    Subjective   Chief Complaint: Abdomen pain/constipation/sciatica.     HPI   Loose stool.  Start regular diet.  Blood pressure still remain high.  Tmax 99.1.  T-current 98.4.  Potassium is low, p.o. potassium, normal magnesium level.  Complaining of low back pain CT scan shows degenerative disc with central canal stenosis and foraminal stenosis, add lidocaine patch.  PT and OT consult today    Review of Systems   Constitutional:  Positive for activity change, appetite change and fatigue. Negative for chills and fever.   HENT:  Negative for hearing loss, nosebleeds, tinnitus and trouble swallowing.    Eyes:  Negative for visual disturbance.   Respiratory:  Negative for cough, chest tightness, shortness of breath and wheezing.    Cardiovascular:  Negative for chest pain, palpitations and leg swelling.   Gastrointestinal:  Negative for abdominal distention, abdominal pain, blood in stool, constipation, diarrhea, nausea and vomiting.   Endocrine: Negative for cold intolerance, heat intolerance, polydipsia, polyphagia and polyuria.   Genitourinary:  Negative for decreased urine volume, difficulty urinating, dysuria, flank pain, frequency and hematuria.   Musculoskeletal:  Positive for arthralgias, gait problem and myalgias. Negative for joint swelling.   Skin:  Negative for rash.   Allergic/Immunologic: Negative for immunocompromised state.   Neurological:  Positive for weakness. Negative for dizziness, syncope, light-headedness and headaches.   Hematological:  Negative for adenopathy. Does not bruise/bleed easily.   Psychiatric/Behavioral:  Negative for confusion and sleep disturbance. The patient is not nervous/anxious.     All pertinent negatives and positives are as above.  All other systems have been reviewed and are negative unless otherwise stated.     Objective    Temp:  [98.4 øF (36.9 øC)-99.1 øF (37.3 øC)] 98.4 øF (36.9 øC)  Heart Rate:  [78-84] 78  Resp:  [16] 16  BP: (126-154)/(7-87) 154/87  Physical Exam  Vitals and nursing note reviewed.   Constitutional:       Comments: Cachectic.  Chronically ill.   HENT:      Head: Normocephalic.   Eyes:      Conjunctiva/sclera: Conjunctivae normal.      Pupils: Pupils are equal, round, and reactive to light.   Neck:      Vascular: No JVD.   Cardiovascular:      Rate and Rhythm: Normal rate and regular rhythm.      Heart sounds: Normal heart sounds.   Pulmonary:      Effort: No respiratory distress.      Breath sounds: No wheezing or rales.      Comments: Diminished breath sound bilateral, clear, on room air.  Chest:      Chest wall: No tenderness.   Abdominal:      General: Bowel sounds are normal. There is no distension.      Palpations: Abdomen is soft.      Tenderness: There is no abdominal tenderness.   Musculoskeletal:         General: No swelling.      Cervical back: Neck supple.   Skin:     General: Skin is warm and dry.      Capillary Refill: Capillary refill takes 2 to 3 seconds.      Findings: No rash.   Neurological:      Mental Status: She is alert and oriented to person, place, and time.      Motor: Weakness present.      Coordination: Coordination abnormal.      Gait: Gait abnormal.      Deep Tendon Reflexes: Reflexes normal.   Psychiatric:         Mood and Affect: Mood normal.         Behavior: Behavior normal.         Thought Content: Thought content normal.          Results Review:  I have reviewed the labs, radiology results, and diagnostic studies.    Laboratory Data:   Results from last 7 days   Lab Units 08/20/23  0419 08/19/23  0518 08/18/23  0643   WBC 10*3/mm3 11.62* 12.37* 13.46*   HEMOGLOBIN g/dL 14.0 14.0 14.7   HEMATOCRIT % 42.4 42.2 44.3   PLATELETS 10*3/mm3 214 204 206        Results from last 7 days   Lab  Units 08/20/23  0419 08/18/23  0643 08/17/23  0411 08/16/23  1147   SODIUM mmol/L 135* 134* 133* 130*   POTASSIUM mmol/L 3.0* 3.6 3.9 3.8   CHLORIDE mmol/L 101 101 98 92*   CO2 mmol/L 23.0 24.0 21.0* 24.0   BUN mg/dL 10 15 19 20   CREATININE mg/dL 0.62 0.69 0.65 0.77   CALCIUM mg/dL 8.6 8.8 9.5 10.5   BILIRUBIN mg/dL  --  1.0 0.9 1.5*   ALK PHOS U/L  --  59 64 75   ALT (SGPT) U/L  --  21 21 27   AST (SGOT) U/L  --  16 18 25   GLUCOSE mg/dL 103* 86 138* 127*       Culture Data:   No results found for: BLOODCX, URINECX, WOUNDCX, MRSACX, RESPCX, STOOLCX    Radiology Data:   Imaging Results (Last 24 Hours)       ** No results found for the last 24 hours. **            I have reviewed the patient's current medications.     Assessment/Plan   Assessment  Active Hospital Problems    Diagnosis     **Hypertensive urgency     Back pain     Tobacco use     Abnormal CT of the abdomen - ? intramural hematoma from aorta to R iliac     Constipation        Treatment Plan  Hypertensive urgency-blood pressures improving.  Patient hypertensive upon arrival.  As needed antihypertensives administered in the emergency department to gain blood pressure control.  Amlodipine initiated on admission and increased to 10 mg this morning.  As needed labetalol.     Abnormal CT of the abdomen-Small intramural hematoma on her in for renal aortic extending to the right common iliac arteries.  Radiologist report favors acute intramural hematoma from the mid abdominal aorta extending distally into the right common iliac artery.  This finding was not present on CTA abdomen pelvis from 8/9.  Vascular surgery recommendation-conservative management for now.  Small intramural hematoma on her in for renal aortic extending to the right common iliac arteries.    Hypokalemia . p.o. potassium.  Magnesium level is normal.     COPD.  PNR albuterol nebs.  Patient is on room air.     Back pain/degenerative disc disease/moderate central canal stenosis L4-5 with  moderate right L4-5 and left L5-S1 foraminal narrowing.  Patient has chronic back pain and takes Flexeril and Norco at home.  Lidocaine patch . Flexeril and Norco.  CT lumbar spine-Osteopenia with mild left convexity of the degenerative lumbar spine,  moderate central canal stenosis at the L4/L5 level with moderate right L4/L5 and left L5-S1 foraminal narrowing as described above, No acute lumbar vertebral fracture or traumatic malalignment.    Constipation-slow IV hydration.  Positive bowel movement.  Patient reports no bowel movement for 8 days  CT abdomen suggest constipation  Bowel regimen with Dulcolax suppository, MiraLAX, Vi-Colace, milk of molasses enemas. Movantik continued.     Leukocytosis continues to improve, no obvious signs or symptoms of infection.  Potentially related to dehydration or stress reaction.     Nausea . Pepcid.  Zofran as needed.     Lovenox for DVT prophylaxis     Nutrition.  Regular diet.    Deconditioning.  PT and OT consult.     Medical Decision Making  Number and Complexity of problems: 1 acute problem of high complexity in abnormal CT of the abdomen suggesting potential intramural hematoma.  1 acute problem of high complexity in hypertensive urgency.  1 acute problem of moderate complexity and back pain.  1 acute problem of moderate complexity and constipation.  Differential Diagnosis: Etiology of CT abdomen findings     Conditions and Status  Condition stable at this time     MDM Data  External documents reviewed: None  Cardiac tracing (EKG, telemetry) interpretation: EKG reviewed  Radiology interpretation: CTA chest, CT abdomen pelvis with contrast reviewed  Labs reviewed: CBC, BMP, urinalysis  Any tests that were considered but not ordered: None     Decision rules/scores evaluated (example [id number], Wells, etc): None     Discussed with: Patient.     Care Planning  Shared decision making: Discussed with above, patient agreeable to plan of care  Code status and discussions:  Full     Disposition  Social Determinants of Health that impact treatment or disposition: None  I expect the patient to likely eventually be discharged home, timeline undeterminable at this time    Electronically signed by Easton Reynoso MD, 08/20/23, 11:49 CDT.

## 2023-08-20 NOTE — PLAN OF CARE
Goal Outcome Evaluation:  Plan of Care Reviewed With: patient        Progress: no change  Outcome Evaluation: OT eval completed. Pt presents A&Ox4, standing in room upon therapist entry. Pt c/o 6/10 low back to R LE pain prior to activities. Pt educated on use of log roll tech for bed mobility, but did not maintain despite cues. While seated EOB, pt completed doffing/donning of socks with SBA, using tech of bringing foot to opposite knee. Pt completed sit<>stand t/f with CGA required. Pt completed fxl mobility with CGA, knee buckling noted with c/o increased low back and R hip pain reported. Pt educated on posture and tech during turns to decrease pain. Pt returned to EOB and transitioned to supine with SBA, not utilizing log roll tech despite cues. Skilled OT warranted to provide education and address pain, balance, activity tolerance, and strength in order to increase pt safety and I during ADLs, fxl mobility, and fxl t/f's. Recommend D/C home with assist and OP services.      Anticipated Discharge Disposition (OT): home with 24/7 care, home with outpatient therapy services

## 2023-08-20 NOTE — THERAPY EVALUATION
Patient Name: Sol Fontenot  : 1965    MRN: [id number]                              Today's Date: 2023       Admit Date: 2023    Visit Dx:     ICD-10-CM ICD-9-CM   1. Hypertension, unspecified type  I10 401.9   2. Abdominal pain, unspecified abdominal location  R10.9 789.00   3. Acute low back pain, unspecified back pain laterality, unspecified whether sciatica present  M54.50 724.2     Patient Active Problem List   Diagnosis    Cigarette smoker    Simple chronic bronchitis    Family history of colonic polyps    Hypertensive urgency    Back pain    Tobacco use    Abnormal CT of the abdomen - ? intramural hematoma from aorta to R iliac    Constipation     Past Medical History:   Diagnosis Date    Arthritis     Asthma     COPD (chronic obstructive pulmonary disease)     Kidney stone      Past Surgical History:   Procedure Laterality Date    COLONOSCOPY N/A 2021    Procedure: COLONOSCOPY WITH ANESTHESIA;  Surgeon: Ti Lea DO;  Location:  [facility];  Service: Gastroenterology;  Laterality: N/A;  pre op: family hx polyps  post op:poor prep  PCP: Chip Hillman DO    COLONOSCOPY N/A 2021    Procedure: COLONOSCOPY WITH ANESTHESIA;  Surgeon: Ti Lea DO;  Location:  [facility];  Service: Gastroenterology;  Laterality: N/A;  Pre: Family hx Colon Polyps  Post: Colon Polyp  Dr. Chip Hillman  CO2 Inflation Used    HYSTERECTOMY  1996    Complete    OOPHORECTOMY        General Information       Row Name 23 1327          OT Time and Intention    Document Type evaluation  Pt presents with low back pain radiating down legs, vomiting, constipation. Dx: hypertensive urgency. Degenerative changes with stenosis at lumbar region noted on CT scan, no acute fx noted. CT abdomen reveals acute intramural hematoma. Hx includes: COPD  -LR     Mode of Treatment occupational therapy  -LR       Row Name 23 1327          General Information    Patient Profile Reviewed  yes  -LR     Prior Level of Function independent:;all household mobility;community mobility;transfer;ADL's;home management;cooking;cleaning;driving;shopping;work  -LR     Existing Precautions/Restrictions fall  -LR     Barriers to Rehab physical barrier  -LR       Row Name 08/20/23 1327          Occupational Profile    Environmental Supports and Barriers (Occupational Profile) tub/shower combo  -LR       Row Name 08/20/23 1327          Living Environment    People in Home child(bari), adult;grandchild(bari)  -LR       Row Name 08/20/23 1327          Home Main Entrance    Number of Stairs, Main Entrance three  -LR     Stair Railings, Main Entrance railing on left side (ascending)  -LR       Row Name 08/20/23 1327          Stairs Within Home, Primary    Number of Stairs, Within Home, Primary none  -LR       Row Name 08/20/23 1327          Cognition    Orientation Status (Cognition) oriented x 4  -LR       Row Name 08/20/23 1327          Safety Issues, Functional Mobility    Safety Issues Affecting Function (Mobility) ability to follow commands;judgment;problem-solving;safety precaution awareness;safety precautions follow-through/compliance;sequencing abilities  -LR     Impairments Affecting Function (Mobility) balance;endurance/activity tolerance;pain;strength  -LR               User Key  (r) = Recorded By, (t) = Taken By, (c) = Cosigned By      Initials Name Provider Type    LR Sierra Mann OTR/L Occupational Therapist                     Mobility/ADL's       Row Name 08/20/23 1327          Bed Mobility    Bed Mobility supine-sit;sit-supine;scooting/bridging  -LR     Scooting/Bridging [place] (Bed Mobility) standby assist;verbal cues  -LR     Supine-Sit [place] (Bed Mobility) standby assist;verbal cues  -LR     Sit-Supine [place] (Bed Mobility) standby assist;verbal cues  -LR     Assistive Device (Bed Mobility) bed rails;head of bed elevated  -LR     Comment, (Bed Mobility) cues for log roll  tech, but did not successfully perform  -LR       Row Name 08/20/23 1327          Transfers    Transfers sit-stand transfer;stand-sit transfer  -LR       Row Name 08/20/23 1327          Sit-Stand Transfer    Sit-Stand [place] (Transfers) contact guard;verbal cues  -LR       Row Name 08/20/23 1327          Stand-Sit Transfer    Stand-Sit [place] (Transfers) contact guard;verbal cues  -LR       Row Name 08/20/23 1327          Functional Mobility    Functional Mobility- Ind. Level contact guard assist;standby assist;verbal cues required  -LR     Functional Mobility- Comment knee buckling on 2 occasions, report increased low back and R hip pain with fxl mobility  -LR       Row Name 08/20/23 1327          Activities of Daily Living    BADL Assessment/Intervention lower body dressing  -LR       [facility] Name 08/20/23 1327          Lower Body Dressing Assessment/Training    [place] Level (Lower Body Dressing) doff;don;socks;standby assist  -LR     Position (Lower Body Dressing) edge of bed sitting  -LR     Comment, (Lower Body Dressing) use of tech for bringing foot to opposite knee  -LR               User Key  (r) = Recorded By, (t) = Taken By, (c) = Cosigned By      Initials Name Provider Type    LR Sierra Mann, OTR/L Occupational Therapist                   Obj/Interventions       Row Name 08/20/23 1327          Sensory Assessment (Somatosensory)    Sensory Assessment (Somatosensory) UE sensation intact  -LR       [facility] Name 08/20/23 1327          Vision Assessment/Intervention    Visual Impairment/Limitations corrective lenses full-time  -LR       Row Name 08/20/23 1327          Range of Motion Comprehensive    General Range of Motion bilateral upper extremity ROM WFL  -LR       [facility] Name 08/20/23 1327          Strength Comprehensive (MMT)    General Manual Muscle Testing (MMT) Assessment upper extremity strength deficits identified  -LR     Comment, General Manual Muscle Testing (MMT) Assessment B UE  proximal 4-/5, distal 4/5  -LR       Row Name 08/20/23 1327          Balance    Balance Assessment sitting static balance;sitting dynamic balance;standing static balance;standing dynamic balance  -LR     Static Sitting Balance supervision  -LR     Dynamic Sitting Balance standby assist  -LR     Position, Sitting Balance supported;unsupported;sitting edge of bed  -LR     Static Standing Balance standby assist;contact guard  -LR     Dynamic Standing Balance contact guard  -LR     Position/Device Used, Standing Balance unsupported  -LR     Comment, Balance c/o increased low back pain with EOB sitting and standing  -LR               User Key  (r) = Recorded By, (t) = Taken By, (c) = Cosigned By      Initials Name Provider Type    LR Sierra Mann, OTR/L Occupational Therapist                   Goals/Plan       Row Name 08/20/23 1327          Transfer Goal 1 (OT)    Activity/Assistive Device (Transfer Goal 1, OT) bed-to-chair/chair-to-bed;toilet;tub  -LR     [place] Level/Cues Needed (Transfer Goal 1, OT) supervision required  -LR     Time Frame (Transfer Goal 1, OT) long term goal (LTG);by discharge  -LR     Progress/Outcome (Transfer Goal 1, OT) new goal  -LR       Row Name 08/20/23 1327          Bathing Goal 1 (OT)    Activity/Device (Bathing Goal 1, OT) bathing skills, all  -LR     [place] Level/Cues Needed (Bathing Goal 1, OT) supervision required  -LR     Time Frame (Bathing Goal 1, OT) long term goal (LTG);by discharge  -LR     Progress/Outcomes (Bathing Goal 1, OT) new goal  -LR       Row Name 08/20/23 1327          Dressing Goal 1 (OT)    Activity/Device (Dressing Goal 1, OT) dressing skills, all  -LR     [place]/Cues Needed (Dressing Goal 1, OT) supervision required  -LR     Time Frame (Dressing Goal 1, OT) long term goal (LTG);by discharge  -LR     Progress/Outcome (Dressing Goal 1, OT) new goal  -LR       Row Name 08/20/23 1327          Therapy Assessment/Plan (OT)    Planned Therapy  "Interventions (OT) activity tolerance training;adaptive equipment training;BADL retraining;functional balance retraining;IADL retraining;occupation/activity based interventions;patient/caregiver education/training;ROM/therapeutic exercise;strengthening exercise;transfer/mobility retraining  -LR               User Key  (r) = Recorded By, (t) = Taken By, (c) = Cosigned By      Initials Name Provider Type    LR Sierra Mann, OTR/L Occupational Therapist                   Clinical Impression       Row Name 08/20/23 9324          Pain Assessment    Pretreatment Pain Rating 6/10  -LR     Posttreatment Pain Rating 2/10  -LR     Pain Location - Side/Orientation Right  -LR     Pain Location lower  -LR     Pain Location - back;hip  -LR     Pre/Posttreatment Pain Comment \"throbbing\", low back to R hip  -LR     Pain Intervention(s) Medication (See MAR);Repositioned;Ambulation/increased activity  -LR       Row Name 08/20/23 4339          Plan of Care Review    Plan of Care Reviewed With patient  -LR     Progress no change  -LR     Outcome Evaluation OT eval completed. Pt presents A&Ox4, standing in room upon therapist entry. Pt c/o 6/10 low back to R LE pain prior to activities. Pt educated on use of log roll tech for bed mobility, but did not maintain despite cues. While seated EOB, pt completed doffing/donning of socks with SBA, using tech of bringing foot to opposite knee. Pt completed sit<>stand t/f with CGA required. Pt completed fxl mobility with CGA, knee buckling noted with c/o increased low back and R hip pain reported. Pt educated on posture and tech during turns to decrease pain. Pt returned to EOB and transitioned to supine with SBA, not utilizing log roll tech despite cues. Skilled OT warranted to provide education and address pain, balance, activity tolerance, and strength in order to increase pt safety and I during ADLs, fxl mobility, and fxl t/f's. Recommend D/C home with assist and OP services.  -LR       " Row Name 08/20/23 1327          Therapy Assessment/Plan (OT)    Rehab Potential (OT) good, to achieve stated therapy goals  -LR     Criteria for Skilled Therapeutic Interventions Met (OT) yes;skilled treatment is necessary  -LR     Therapy Frequency (OT) 5 times/wk  -LR     Predicted Duration of Therapy Intervention (OT) until hospital D/C  -LR       Row Name 08/20/23 1327          Therapy Plan Review/Discharge Plan (OT)    Anticipated Discharge Disposition (OT) home with [address] [place] [address];home with outpatient therapy services  -LR       Row Name 08/20/23 1327          Vital Signs    O2 Delivery Pre Treatment room air  -LR     O2 Delivery Intra Treatment room air  -LR     O2 Delivery Post Treatment room air  -LR     Pre Patient Position Standing  -LR     Intra Patient Position Standing  -LR     Post Patient Position Supine  -LR       Row Name 08/20/23 1327          Positioning and Restraints    Pre-Treatment Position standing in room  -LR     Post Treatment Position bed  -LR     In Bed notified nsg;fowlers;call light within reach;encouraged to call for assist;exit alarm on;with family/caregiver;side rails up x2  -LR               User Key  (r) = Recorded By, (t) = Taken By, (c) = Cosigned By      Initials Name Provider Type    LR Sierra Mann, OTR/L Occupational Therapist                   Outcome Measures       Row Name 08/20/23 1327          How much help from another is currently needed...    Putting on and taking off regular lower body clothing? 3  -LR     Bathing (including washing, rinsing, and drying) 3  -LR     Toileting (which includes using toilet bed pan or urinal) 3  -LR     Putting on and taking off regular upper body clothing 3  -LR     Taking care of personal grooming (such as brushing teeth) 3  -LR     Eating meals 4  -LR     AM-PAC 6 Clicks Score (OT) 19  -LR       Row Name 08/20/23 1327          Functional Assessment    Outcome Measure Options AM-PAC 6 Clicks Daily Activity (OT)  -LR                User Key  (r) = Recorded By, (t) = Taken By, (c) = Cosigned By      Initials Name Provider Type    LR Sierra Mann OTR/L Occupational Therapist                    Occupational Therapy Education       Title: PT OT SLP Therapies (In Progress)       Topic: Occupational Therapy (In Progress)       Point: ADL training (Done)       Description:   Instruct learner(s) on proper safety adaptation and remediation techniques during self care or transfers.   Instruct in proper use of assistive devices.                  Learning Progress Summary             Patient Acceptance, E,D, VU,NR by LR at 8/20/2023 1437                         Point: Home exercise program (Not Started)       Description:   Instruct learner(s) on appropriate technique for monitoring, assisting and/or progressing therapeutic exercises/activities.                  Learner Progress:  Not documented in this visit.              Point: Precautions (Done)       Description:   Instruct learner(s) on prescribed precautions during self-care and functional transfers.                  Learning Progress Summary             Patient Acceptance, E,D, VU,NR by LR at 8/20/2023 1437                         Point: Body mechanics (Done)       Description:   Instruct learner(s) on proper positioning and spine alignment during self-care, functional mobility activities and/or exercises.                  Learning Progress Summary             Patient Acceptance, E,D, VU,NR by LR at 8/20/2023 1437                                         User Key       Initials Effective Dates Name Provider Type Discipline     04/25/23 -  Sierra Mann OTR/L Occupational Therapist OT                  OT Recommendation and Plan  Planned Therapy Interventions (OT): activity tolerance training, adaptive equipment training, BADL retraining, functional balance retraining, IADL retraining, occupation/activity based interventions, patient/caregiver education/training, ROM/therapeutic  exercise, strengthening exercise, transfer/mobility retraining  Therapy Frequency (OT): 5 times/wk  Plan of Care Review  Plan of Care Reviewed With: patient  Progress: no change  Outcome Evaluation: OT eval completed. Pt presents A&Ox4, standing in room upon therapist entry. Pt c/o 6/10 low back to R LE pain prior to activities. Pt educated on use of log roll tech for bed mobility, but did not maintain despite cues. While seated EOB, pt completed doffing/donning of socks with SBA, using tech of bringing foot to opposite knee. Pt completed sit<>stand t/f with CGA required. Pt completed fxl mobility with CGA, knee buckling noted with c/o increased low back and R hip pain reported. Pt educated on posture and tech during turns to decrease pain. Pt returned to EOB and transitioned to supine with SBA, not utilizing log roll tech despite cues. Skilled OT warranted to provide education and address pain, balance, activity tolerance, and strength in order to increase pt safety and I during ADLs, fxl mobility, and fxl t/f's. Recommend D/C home with assist and OP services.     Time Calculation:         Time Calculation- OT       Row Name 08/20/23 1327             Time Calculation- OT    OT Start Time 1327  -LR      OT Stop Time 1421  -LR      OT Time Calculation (min) 54 min  -LR      OT Received On 08/20/23  -LR      OT Goal Re-Cert Due Date 08/30/23  -LR         Untimed Charges    OT Eval/Re-eval Minutes 54  -LR         Total Minutes    Untimed Charges Total Minutes 54  -LR       Total Minutes 54  -LR                User Key  (r) = Recorded By, (t) = Taken By, (c) = Cosigned By      Initials Name Provider Type    LR Sierra Mann OTR/L Occupational Therapist                  Therapy Charges for Today       Code Description Service Date Service Provider Modifiers Qty    [id number] HC OT EVAL MOD COMPLEXITY 4 8/20/2023 Sierra Mann OTR/L GO 1                 KARLA Roy/PAVITHRA  8/20/2023

## 2023-08-20 NOTE — PLAN OF CARE
Goal Outcome Evaluation:  Plan of Care Reviewed With: patient        Progress: improving  Outcome Evaluation: SR/ST . patient had diarrhea throughout night but states during dayshift she had hard stool and liquid stool combined. no acute changes overnight. vital signs WNL.

## 2023-08-21 VITALS
RESPIRATION RATE: 18 BRPM | HEART RATE: 96 BPM | TEMPERATURE: 98 F | SYSTOLIC BLOOD PRESSURE: 148 MMHG | DIASTOLIC BLOOD PRESSURE: 90 MMHG | OXYGEN SATURATION: 99 % | HEIGHT: 69 IN | BODY MASS INDEX: 19.23 KG/M2 | WEIGHT: 129.8 LBS

## 2023-08-21 PROBLEM — I71.02: Status: ACTIVE | Noted: 2023-08-21

## 2023-08-21 LAB
AMMONIA BLD-SCNC: 15 UMOL/L (ref 11–51)
ANION GAP SERPL CALCULATED.3IONS-SCNC: 10 MMOL/L (ref 5–15)
BUN SERPL-MCNC: 8 MG/DL (ref 6–20)
BUN/CREAT SERPL: 12.9 (ref 7–25)
CALCIUM SPEC-SCNC: 8.9 MG/DL (ref 8.6–10.5)
CHLORIDE SERPL-SCNC: 99 MMOL/L (ref 98–107)
CO2 SERPL-SCNC: 25 MMOL/L (ref 22–29)
CREAT SERPL-MCNC: 0.62 MG/DL (ref 0.57–1)
DEPRECATED RDW RBC AUTO: 45.1 FL (ref 37–54)
EGFRCR SERPLBLD CKD-EPI 2021: 104 ML/MIN/1.73
ERYTHROCYTE [DISTWIDTH] IN BLOOD BY AUTOMATED COUNT: 14.1 % (ref 12.3–15.4)
GLUCOSE SERPL-MCNC: 115 MG/DL (ref 65–99)
HCT VFR BLD AUTO: 42.7 % (ref 34–46.6)
HGB BLD-MCNC: 14.1 G/DL (ref 12–15.9)
MCH RBC QN AUTO: 29.4 PG (ref 26.6–33)
MCHC RBC AUTO-ENTMCNC: 33 G/DL (ref 31.5–35.7)
MCV RBC AUTO: 89.1 FL (ref 79–97)
PLATELET # BLD AUTO: 253 10*3/MM3 (ref 140–450)
PMV BLD AUTO: 10.5 FL (ref 6–12)
POTASSIUM SERPL-SCNC: 3.6 MMOL/L (ref 3.5–5.2)
RBC # BLD AUTO: 4.79 10*6/MM3 (ref 3.77–5.28)
SODIUM SERPL-SCNC: 134 MMOL/L (ref 136–145)
WBC NRBC COR # BLD: 13.81 10*3/MM3 (ref 3.4–10.8)

## 2023-08-21 PROCEDURE — 85027 COMPLETE CBC AUTOMATED: CPT | Performed by: FAMILY MEDICINE

## 2023-08-21 PROCEDURE — 96376 TX/PRO/DX INJ SAME DRUG ADON: CPT

## 2023-08-21 PROCEDURE — G0378 HOSPITAL OBSERVATION PER HR: HCPCS

## 2023-08-21 PROCEDURE — 80048 BASIC METABOLIC PNL TOTAL CA: CPT | Performed by: FAMILY MEDICINE

## 2023-08-21 PROCEDURE — 25010000002 DIPHENHYDRAMINE PER 50 MG: Performed by: INTERNAL MEDICINE

## 2023-08-21 PROCEDURE — 82140 ASSAY OF AMMONIA: CPT | Performed by: INTERNAL MEDICINE

## 2023-08-21 RX ORDER — QUETIAPINE FUMARATE 25 MG/1
25 TABLET, FILM COATED ORAL ONCE
Status: DISCONTINUED | OUTPATIENT
Start: 2023-08-21 | End: 2023-08-21 | Stop reason: HOSPADM

## 2023-08-21 RX ORDER — LISINOPRIL 20 MG/1
20 TABLET ORAL
Qty: 30 TABLET | Refills: 0 | Status: SHIPPED | OUTPATIENT
Start: 2023-08-22

## 2023-08-21 RX ORDER — AMOXICILLIN 250 MG
2 CAPSULE ORAL NIGHTLY
Start: 2023-08-21

## 2023-08-21 RX ORDER — AMLODIPINE BESYLATE 10 MG/1
10 TABLET ORAL
Qty: 30 TABLET | Refills: 0 | Status: SHIPPED | OUTPATIENT
Start: 2023-08-22

## 2023-08-21 RX ORDER — LISINOPRIL 20 MG/1
20 TABLET ORAL
Status: DISCONTINUED | OUTPATIENT
Start: 2023-08-21 | End: 2023-08-21 | Stop reason: HOSPADM

## 2023-08-21 RX ADMIN — AMLODIPINE BESYLATE 10 MG: 10 TABLET ORAL at 08:43

## 2023-08-21 RX ADMIN — FAMOTIDINE 20 MG: 10 INJECTION INTRAVENOUS at 08:43

## 2023-08-21 RX ADMIN — LISINOPRIL 20 MG: 20 TABLET ORAL at 08:43

## 2023-08-21 RX ADMIN — HYDROCODONE BITARTRATE AND ACETAMINOPHEN 1 TABLET: 10; 325 TABLET ORAL at 01:53

## 2023-08-21 RX ADMIN — CYCLOBENZAPRINE 10 MG: 10 TABLET, FILM COATED ORAL at 01:53

## 2023-08-21 RX ADMIN — NALOXEGOL OXALATE 25 MG: 25 TABLET, FILM COATED ORAL at 08:43

## 2023-08-21 RX ADMIN — DIPHENHYDRAMINE HYDROCHLORIDE 25 MG: 50 INJECTION, SOLUTION INTRAMUSCULAR; INTRAVENOUS at 03:22

## 2023-08-21 NOTE — THERAPY DISCHARGE NOTE
Acute Care - Occupational Therapy Discharge Summary  [facility]     Patient Name: Sol Fontenot  : 1965  MRN: [id number]    Today's Date: 2023                 Admit Date: 2023        OT Recommendation and Plan    Visit Dx:    ICD-10-CM ICD-9-CM   1. Hypertension, unspecified type  I10 401.9   2. Abdominal pain, unspecified abdominal location  R10.9 789.00   3. Acute low back pain, unspecified back pain laterality, unspecified whether sciatica present  M54.50 724.2   4. Intramural hematoma of abdominal aorta  I71.02 441.02                OT Rehab Goals       Row Name 23 1100             Transfer Goal 1 (OT)    Activity/Assistive Device (Transfer Goal 1, OT) bed-to-chair/chair-to-bed;toilet;tub  -JJ      [place] Level/Cues Needed (Transfer Goal 1, OT) supervision required  -JJ      Time Frame (Transfer Goal 1, OT) long term goal (LTG);by discharge  -JJ      Progress/Outcome (Transfer Goal 1, OT) goal not met;discharged from facility  -         Bathing Goal 1 (OT)    Activity/Device (Bathing Goal 1, OT) bathing skills, all  -JJ      [place] Level/Cues Needed (Bathing Goal 1, OT) supervision required  -JJ      Time Frame (Bathing Goal 1, OT) long term goal (LTG);by discharge  -JJ      Progress/Outcomes (Bathing Goal 1, OT) goal not met;discharged from facility  -         Dressing Goal 1 (OT)    Activity/Device (Dressing Goal 1, OT) dressing skills, all  -JJ      [place]/Cues Needed (Dressing Goal 1, OT) supervision required  -JJ      Time Frame (Dressing Goal 1, OT) long term goal (LTG);by discharge  -JJ      Progress/Outcome (Dressing Goal 1, OT) goal not met;discharged from facility  -                User Key  (r) = Recorded By, (t) = Taken By, (c) = Cosigned By      Initials Name Provider Type Discipline    Kylie Oswald, OTR/L, CSRS Occupational Therapist OT                                OT Discharge Summary  Anticipated Discharge Disposition (OT): home with  24/7 care, home with outpatient therapy services  Reason for Discharge: Discharge from facility  Outcomes Achieved: Refer to plan of care for updates on goals achieved  Discharge Destination: Home with assist, Home with home health      Klyie Baumann OTR/L, CSRS  8/21/2023

## 2023-08-21 NOTE — PLAN OF CARE
Goal Outcome Evaluation:  Plan of Care Reviewed With: patient        Progress: improving  Outcome Evaluation: Multiple bm overnight.  Patient restless and has difficulty sleeping.  Very anxious and nervous.  Fixated on IV going to be pulled out, but IV very stable and sorbaview placement good.  Gave patient pain med, flexeril and benedryl.  Patient states she has not slept well for several nights.  Patient seems confused at times and states she is seeing people in room and bugs on walls at times.  Patient pulled off tele lead and made herself upside down in bed (head at foot of bed).  Able to reorient patient easily, but patient remain anxious.  Call light in reach.    NSR 76-92
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
## 2023-08-21 NOTE — DISCHARGE SUMMARY
[facility] Medicine Services  DISCHARGE SUMMARY       Date of Admission: 8/16/2023  Date of Discharge:  8/21/2023  Primary Care Physician: Chip Hillman DO    Presenting Problem/History of Present Illness:  Hypertensive urgency, question of intramural aortic hematoma, constipation, back pain    Final Discharge Diagnoses:  Active Hospital Problems    Diagnosis     **Hypertensive urgency     Intramural hematoma of abdominal aorta     Back pain     Tobacco use     Abnormal CT of the abdomen - ? intramural hematoma from aorta to R iliac     Constipation        Consults: Vascular surgery, Dr. Lundberg    Procedures Performed: None    Pertinent Test Results:       Imaging Results (All)       Procedure Component Value Units Date/Time    CT Angiogram Chest [[id number]] Collected: 08/16/23 1409     Updated: 08/16/23 1419    Narrative:      CT ANGIOGRAM CHEST- 8/16/2023 1:40 PM CDT      HISTORY: abnormal abd CT, r/o dissection      COMPARISON: CT angiogram dated 08/09/2023.      DOSE LENGTH PRODUCT: 218 mGy cm. Automated exposure control was also  utilized to decrease patient radiation dose.     TECHNIQUE: Helical tomographic images of the chest were obtained after  the administration of intravenous contrast following angiogram protocol.  Additionally, 3D and multiplanar reformatted images were provided.        FINDINGS:    Pulmonary arteries: There is adequate enhancement of the pulmonary  arteries to evaluate for central and segmental pulmonary emboli. There  are no filling defects within the main, lobar, segmental or visualized  subsegmental pulmonary arteries. The pulmonary arteries are within  normal limits for size.      Aorta and great vessels: Thoracic aorta is normal in caliber. No  dissection identified. No flow-limiting stenosis identified at the great  vessel origins. No intramural hematoma.     Visualized neck base: The imaged portion of the base of the neck  appears  unremarkable.      Lungs: Paraseptal emphysema. Lungs are clear and well expanded. No  pleural effusion. Airways are clear.     Heart: The heart is normal in size. There is no pericardial effusion.  Mild coronary atheromatous calcification.     Mediastinum and lymph nodes: No suspicious hilar or mediastinal  adenopathy.     Skeletal and soft tissues: Chest wall soft tissues are unremarkable.  Osteopenia. Exaggerated thoracic kyphosis. Vertebral body heights are  well-maintained.          Impression:      1. No acute aortic pathology or other acute cardiopulmonary process.  Thoracic aorta is nondilated.  2. Paraseptal emphysema.        This report was finalized on 08/16/2023 14:16 by Dr Brett Reyes, .    CT Abdomen Pelvis With Contrast [[id number]] Collected: 08/16/23 1254     Updated: 08/16/23 1324    Narrative:      EXAM/TECHNIQUE: CT abdomen pelvis with IV contrast     INDICATION: Abdominal pain, constipation, vomiting     COMPARISON: 08/09/2023     DLP: 218 mGy cm. Automated exposure control was also utilized to  decrease patient radiation dose.     FINDINGS:     The lung bases are clear.     No suspicious focal liver lesion. No gallstones or gallbladder wall  thickening. No biliary ductal dilatation. Spleen, pancreas, and adrenal  glands are unremarkable. No solid renal mass. No urolithiasis or  hydronephrosis. No focal urinary bladder wall thickening.     No colonic wall thickening or pericolonic fat stranding. Normal  appendix. Moderate volume stool throughout the colon. No small bowel  distention or evidence of active small bowel inflammation.     No ascites or free pelvic fluid. No pelvic mass or pelvic collection.  Prior hysterectomy.     Nonaneurysmal atherosclerotic abdominal aorta. There is new eccentric  wall thickening along the anterior abdominal aorta beginning at the mid  abdominal aorta at the level of the renal vein, extending to the distal  abdominal aorta and into the RIGHT common  iliac artery, highly  concerning for new intramural hematoma. This area of thickening measures  up to 6 mm thickness. No aortic dissection flap identified. No  penetrating atherosclerotic ulcer identified. The celiac artery SMA, and  JEFE are widely patent. Renal arteries are widely patent.     No enlarged retroperitoneal, mesenteric, pelvic, or inguinal lymph  nodes.     No acute abdominal wall soft tissue abnormality. Multilevel lumbar spine  degenerative change.       Impression:         1.  New eccentric thickening along the anterior abdominal aorta  beginning at the mid abdominal aorta and extending distally into the  RIGHT common iliac artery. This is favored to represent an acute  intramural hematoma. Of note, this finding was not present on a CT  performed recently on 08/09/2023.     2.  Moderate volume stool throughout the colon. Correlate for  constipation.     --- These findings were discussed with SRIDHAR SHARMA on 8/16/2023 1:18  PM CDT by Dr. Chris Us.  This report was finalized on 08/16/2023 13:21 by Dr. Chris Us MD.    CT Lumbar Spine Without Contrast [[id number]] Collected: 08/16/23 1255     Updated: 08/16/23 1303    Narrative:      CT LUMBAR SPINE WO CONTRAST- 8/16/2023 12:41 PM CDT     HISTORY: worsening low back pain      COMPARISON: None     DOSE LENGTH PRODUCT: 235 mGy cm. Automated exposure control was also  utilized to decrease patient radiation dose.     TECHNIQUE: Axial images lumbar spine obtained with sagittal coronal  reconstructions     FINDINGS:  The alignment of lumbar spine is appropriate. There is  osteopenia. There is advanced degenerative disc change along the right  L4/L5 disc with a slight left convexity of the lumbar curvature  measuring 10 degrees with apex at the L4 level. There is a prominent  right L5 transverse process articulates with the sacrum. There is no  compression deformity or acute lumbar vertebral fracture. There is  moderate lower lumbar facet  arthropathy.     At L3/L4, there is mild broad-based disc bulging with mild to moderate  facet arthropathy. Mild to moderate central canal stenosis with no  prominent foraminal narrowing.     At L4/L5, there is posterior endplate spurring and disc bulging with a  right foraminal predominance with moderate right mild left facet  arthropathy. There is moderate central canal stenosis with moderate  right foraminal narrowing.     At L5-S1, no prominent central canal stenosis. There is moderate left  foraminal narrowing related to posterior lateral endplate spurring and  mild to moderate facet arthropathy.     Mild to moderate vascular calcification.       Impression:      1. Osteopenia with mild left convexity of the degenerative lumbar spine.  There is moderate central canal stenosis at the L4/L5 level with  moderate right L4/L5 and left L5-S1 foraminal narrowing as described  above. No acute lumbar vertebral fracture or traumatic malalignment.  This report was finalized on 08/16/2023 13:00 by Dr. Kylie Toro MD.          LAB RESULTS:      Lab 08/21/23  0359 08/20/23  0419 08/19/23  0518 08/18/23  0643 08/17/23  0411 08/16/23  1445 08/16/23  1244 08/16/23  1147   WBC 13.81* 11.62* 12.37* 13.46* 19.54*  --   --  27.17*   HEMOGLOBIN 14.1 14.0 14.0 14.7 14.1  --   --  15.2   HEMATOCRIT 42.7 42.4 42.2 44.3 41.4  --   --  43.5   PLATELETS 253 214 204 206 211  --   --  233   NEUTROS ABS  --   --   --   --  16.32*  --   --  23.32*   IMMATURE GRANS (ABS)  --   --   --   --  0.14*  --   --  0.32*   LYMPHS ABS  --   --   --   --  1.92  --   --  2.05   MONOS ABS  --   --   --   --  1.06*  --   --  1.40*   EOS ABS  --   --   --   --  0.07  --   --  0.01   MCV 89.1 89.8 88.8 89.1 86.6  --   --  86.5   LACTATE  --   --   --   --   --  1.5  --  2.4*   PROTIME  --   --   --   --   --   --  12.9  --    APTT  --   --   --   --   --   --  27.0  --          Lab 08/21/23  0359 08/20/23  0419 08/18/23  0643 08/17/23  0411 08/16/23  1147    SODIUM 134* 135* 134* 133* 130*   POTASSIUM 3.6 3.0* 3.6 3.9 3.8   CHLORIDE 99 101 101 98 92*   CO2 25.0 23.0 24.0 21.0* 24.0   ANION GAP 10.0 11.0 9.0 14.0 14.0   BUN 8 10 15 19 20   CREATININE 0.62 0.62 0.69 0.65 0.77   EGFR 104.0 104.0 101.4 102.8 90.1   GLUCOSE 115* 103* 86 138* 127*   CALCIUM 8.9 8.6 8.8 9.5 10.5   MAGNESIUM  --  1.9  --  2.2 2.0   PHOSPHORUS  --   --   --  3.0  --    HEMOGLOBIN A1C  --  5.50  --   --   --    TSH  --  1.120  --   --   --          Lab 08/18/23  0643 08/17/23  0411 08/16/23  1147   TOTAL PROTEIN 6.3 7.1 8.0   ALBUMIN 3.4* 4.0 4.1   GLOBULIN 2.9 3.1 3.9   ALT (SGPT) 21 21 27   AST (SGOT) 16 18 25   BILIRUBIN 1.0 0.9 1.5*   ALK PHOS 59 64 75   LIPASE  --   --  16         Lab 08/16/23  1244   PROTIME 12.9   INR 0.96         Lab 08/20/23  0419   CHOLESTEROL 168   LDL CHOL 110*   HDL CHOL 39*   TRIGLYCERIDES 105             Brief Urine Lab Results  (Last result in the past 365 days)        Color   Clarity   Blood   Leuk Est   Nitrite   Protein   CREAT   Urine HCG        08/16/23 1159 Yellow   Cloudy   Moderate (2+)   Negative   Negative   30 mg/dL (1+)                 Hospital Course:  Upon arrival:  [facility] with complaints of back pain and vomiting.  Recent history of lumbar back pain with sciatica-like symptoms.  She had been treated outpatient with Norco and Flexeril.  Since initiating Norco, she was unable to have a bowel movement.  She complained of abdominal and back pain upon arrival.  CT abdomen showed constipation as well as a concern for possible intramural hematoma on her infrarenal aorta extending into the right common iliac artery.  Upon arrival, she was found to be hypotensive.  She was admitted for pain control, hypertension control, reassessment of back pain, vascular surgery consult for further evaluation of potential hematoma.    Thereafter:  Hypertensive urgency-  Patient hypertensive on arrival with  presenting systolic blood pressure of 192.  She was on a Cardene drip for short amount of time.  She has been treated with amlodipine and lisinopril.  Blood pressure overall under much better control.  Recommend further follow-up with PCP for antihypertensive regimen and blood pressure evaluation.    Abnormal CT of the abdomen-  Dr. Lundberg with vascular surgery evaluated patient due to concerns of small intramural hematoma on infrarenal aorta extending into right common iliac artery.  Dr. Lundberg evaluated patient and wishes to proceed with conservative treatment at this time and no surgical intervention is necessary.  He did recommend a follow-up CT scan and believes hematoma will resolve on its own in the next 3 to 6 months.  I explained to the patient need to repeat CT of her abdomen to reassess this.  However, patient defers follow-up imaging at this time and is adamant to discharge.  She tells me she is willing to follow-up with vascular surgery outpatient and undergo follow-up CT abdomen and pelvis outpatient as well.  I explained this would delay reassessment of the hematoma and discussed potential consequences secondary to delaying this.  Patient accepts risks and expresses understanding.    Constipation-  Upon arrival, patient had had no bowel movement for 8 days.  Bowel regimen was initiated and Dulcolax suppository, MiraLAX, Senokot, milk of molasses enema were utilized.  Eventually, Movantik was initiated after patient failed previously mentioned therapies.  The days to follow, patient has had multiple bowel movements and all abdominal pain has resolved.  Educated patient regarding OTC bowel regimen upon discharge.  Will prescribe Movantik upon discharge as patient will have continued narcotic use.    Lumbar degenerative disc disease-  Patient experience back pain originally that was more severe than her recent back pain.  Patient felt this to be more so related to her marked constipation.  At time of  discharge, patient is back to baseline pain which is under acceptable control and she has independently ambulated without difficulty morning of discharge.  CT scan shows osteopenia with mild left convexity of the degenerative lumbar spine, moderate central canal stenosis at L4/L5 with moderate right L4/L5 and left L5-S1 foraminal narrowing, no acute lumbar vertebral fracture or traumatic malalignment.  Discussed with patient need to continue follow-up outpatient for further care.  Pain medication upon discharge as prior to arrival, patient is under controlled substance contract.    Intermittent confusion-  Patient was noted to be intermittently confused during hospitalization even experiencing hallucinations at times.  Morning of discharge, she is completely alert and oriented x4.  She is appropriate in conversation.  She recounts all events prior to and during her hospitalization.  She does tell me that previously she admits to being confused and seeing things that were not there.  These hallucinations have since resolved.  She tells me she believes this was related to IV pain medication and she plans to avoid this in the future.  She is back to her neurological baseline and appropriate for discharge.    Morning of discharge, patient is in no acute distress on room air.  She is ambulated without difficulty.  Back pain is back to her recent prior to arrival baseline which is acceptable to patient and does not keep her from functioning.  Abdominal pain has resolved and patient has had multiple bowel movements.  Discussed with patient the need for potential PT/OT outpatient.  She tells me she will follow-up with Dr. Hillman regarding this and does not wish for any specific orders to be placed upon discharge as she feels this is best coordinated through her PCP.  Patient extensively educated regarding need for follow-up CT abdomen and further follow-up with vascular surgery.  She expresses understanding.  She is very  "eager to discharge at this time and has no further concerns or questions.  She tells me she has all needed DME at home.    Will order outpatient follow-up CTA abdomen and pelvis with contrast for further vascular evaluation per patient preferences.  This will be done in an effort to provide a sooner evaluation although patient extensively educated inpatient scan would be ideal.  She accepts potential risks of deferring inpatient scan.  Discussion with KYRA Weaver regarding outpatient imaging and outpatient follow.    Recommend follow-up with PCP in 1 week  Recommend follow-up with vascular surgery within 1 week, vascular surgery will follow CT scan results.      Physical Exam on Discharge:  /90 (BP Location: Left arm, Patient Position: Lying)   Pulse 96   Temp 98 øF (36.7 øC) (Oral)   Resp 18   Ht 175.3 cm (69\")   Wt 58.9 kg (129 lb 12.8 oz)   SpO2 99%   BMI 19.17 kg/mý   Physical Exam  Vitals and nursing note reviewed.   Constitutional:       Comments: Cachectic.  Chronically ill.   HENT:      Head: Normocephalic.   Eyes:      Conjunctiva/sclera: Conjunctivae normal.      Pupils: Pupils are equal, round, and reactive to light.   Neck:      Vascular: No JVD.   Cardiovascular:      Rate and Rhythm: Normal rate and regular rhythm.      Heart sounds: Normal heart sounds.   Pulmonary:      Effort: No respiratory distress.      Breath sounds: No wheezing or rales.      Comments: Diminished breath sound bilateral, room air.  Chest:      Chest wall: No tenderness.   Abdominal:      General: Bowel sounds are normal. There is no distension.      Palpations: Abdomen is soft.      Tenderness: There is no abdominal tenderness.   Musculoskeletal:         General: No swelling.      Cervical back: Neck supple.   Skin:     General: Skin is warm and dry.      Capillary Refill: Capillary refill takes 2 to 3 seconds.      Findings: No rash.   Neurological:      Mental Status: She is alert and oriented to " person, place, time, situation.     Deep Tendon Reflexes: Reflexes normal.   Psychiatric:         Mood and Affect: Mood normal.         Behavior: Behavior normal.         Thought Content: Thought content normal.    Condition on Discharge: Stable    Discharge Disposition:  Home or Self Care    Discharge Medications:     Discharge Medications        New Medications        Instructions Start Date   amLODIPine 10 MG tablet  Commonly known as: NORVASC   10 mg, Oral, Every 24 Hours Scheduled   Start Date: August 22, 2023     lisinopril 20 MG tablet  Commonly known as: PRINIVIL,ZESTRIL   20 mg, Oral, Every 24 Hours Scheduled   Start Date: August 22, 2023     Naloxegol Oxalate 25 MG tablet  Commonly known as: MOVANTIK   25 mg, Oral, Daily   Start Date: August 22, 2023     sennosides-docusate 8.6-50 MG per tablet  Commonly known as: PERICOLACE   2 tablets, Oral, Nightly, Obtain OTC             Continue These Medications        Instructions Start Date   cyclobenzaprine 10 MG tablet  Commonly known as: FLEXERIL   10 mg, Oral, 3 Times Daily PRN      Humira Pen 40 MG/0.8ML Pen-injector Kit  Generic drug: Adalimumab   Inject 40 mg under the skin into the appropriate area as directed Every 14 (Fourteen) Days.      HYDROcodone-acetaminophen 5-325 MG per tablet  Commonly known as: NORCO   1 tablet, Oral, Every 6 Hours PRN      ketoconazole 2 % cream  Commonly known as: NIZORAL   1 application , Topical, 2 Times Daily      Vitamin B Complex tablet   1 tablet, Oral, Daily      vitamin B-12 1000 MCG tablet  Commonly known as: CYANOCOBALAMIN   1,000 mcg, Oral, Daily      vitamin D 1.25 MG (97639 UT) capsule capsule  Commonly known as: ERGOCALCIFEROL   Take 1 capsule by mouth Every 7 (Seven) Days.      vitamin D3 125 MCG (5000 UT) capsule capsule   5,000 Units, Oral, Daily             Stop These Medications      dexAMETHasone 4 MG tablet  Commonly known as: DECADRON            Discharge Diet:   Diet Instructions       Diet: Cardiac  Diets; Healthy Heart (2-3 Na+); Regular Texture (IDDSI 7); Thin (IDDSI 0)      Discharge Diet: Cardiac Diets    Cardiac Diet: Healthy Heart (2-3 Na+)    Texture: Regular Texture (IDDSI 7)    Fluid Consistency: Thin (IDDSI 0)            Activity at Discharge:   Activity Instructions       Up WIth Assist              Follow-up Appointments:  PCP 1 week  Vascular surgery 1 week  No future appointments.    Test Results Pending at Discharge: None    Electronically signed by KYRA Evans, 08/21/23, 09:24 CDT.    Time: 35 minutes.

## 2023-08-22 ENCOUNTER — READMISSION MANAGEMENT (OUTPATIENT)
Dept: CALL CENTER | Facility: HOSPITAL | Age: 58
End: 2023-08-22
Payer: COMMERCIAL

## 2023-08-22 NOTE — OUTREACH NOTE
Prep Survey      Flowsheet Row Responses   [facility] patient discharged from? [place]   Is LACE score < 7 ? No   Eligibility Readm Mgmt   Discharge diagnosis back pain, vomitingHypertensive urgency   Does the patient have one of the following disease processes/diagnoses(primary or secondary)? Other   Does the patient have Home health ordered? No   Is there a DME ordered? No   Prep survey completed? Yes            FARRAH FLOOD - Registered Nurse

## 2023-08-24 ENCOUNTER — APPOINTMENT (OUTPATIENT)
Dept: MRI IMAGING | Facility: HOSPITAL | Age: 58
End: 2023-08-24
Payer: COMMERCIAL

## 2023-08-24 ENCOUNTER — HOSPITAL ENCOUNTER (EMERGENCY)
Facility: HOSPITAL | Age: 58
Discharge: OTHER INSTITUTION | End: 2023-08-24
Attending: EMERGENCY MEDICINE
Payer: COMMERCIAL

## 2023-08-24 VITALS
SYSTOLIC BLOOD PRESSURE: 132 MMHG | HEIGHT: 69 IN | TEMPERATURE: 98.3 F | WEIGHT: 135 LBS | OXYGEN SATURATION: 98 % | HEART RATE: 89 BPM | DIASTOLIC BLOOD PRESSURE: 88 MMHG | BODY MASS INDEX: 19.99 KG/M2 | RESPIRATION RATE: 17 BRPM

## 2023-08-24 DIAGNOSIS — S34.3XXA CAUDA EQUINA SPINAL CORD INJURY, INITIAL ENCOUNTER: Primary | ICD-10-CM

## 2023-08-24 DIAGNOSIS — N17.9 AKI (ACUTE KIDNEY INJURY): ICD-10-CM

## 2023-08-24 DIAGNOSIS — I62.00 SUBDURAL HEMORRHAGE: ICD-10-CM

## 2023-08-24 DIAGNOSIS — I10 ESSENTIAL HYPERTENSION: ICD-10-CM

## 2023-08-24 LAB
ALBUMIN SERPL-MCNC: 3.7 G/DL (ref 3.5–5.2)
ALBUMIN/GLOB SERPL: 1.2 G/DL
ALP SERPL-CCNC: 75 U/L (ref 39–117)
ALT SERPL W P-5'-P-CCNC: 47 U/L (ref 1–33)
ANION GAP SERPL CALCULATED.3IONS-SCNC: 13 MMOL/L (ref 5–15)
APTT PPP: 26.4 SECONDS (ref 24.1–35)
AST SERPL-CCNC: 23 U/L (ref 1–32)
BASOPHILS # BLD AUTO: 0.07 10*3/MM3 (ref 0–0.2)
BASOPHILS NFR BLD AUTO: 0.6 % (ref 0–1.5)
BILIRUB SERPL-MCNC: 0.8 MG/DL (ref 0–1.2)
BUN SERPL-MCNC: 22 MG/DL (ref 6–20)
BUN/CREAT SERPL: 16.8 (ref 7–25)
CALCIUM SPEC-SCNC: 9.7 MG/DL (ref 8.6–10.5)
CHLORIDE SERPL-SCNC: 93 MMOL/L (ref 98–107)
CO2 SERPL-SCNC: 24 MMOL/L (ref 22–29)
CREAT SERPL-MCNC: 1.31 MG/DL (ref 0.57–1)
DEPRECATED RDW RBC AUTO: 42.6 FL (ref 37–54)
EGFRCR SERPLBLD CKD-EPI 2021: 47.6 ML/MIN/1.73
EOSINOPHIL # BLD AUTO: 0.2 10*3/MM3 (ref 0–0.4)
EOSINOPHIL NFR BLD AUTO: 1.6 % (ref 0.3–6.2)
ERYTHROCYTE [DISTWIDTH] IN BLOOD BY AUTOMATED COUNT: 14 % (ref 12.3–15.4)
GLOBULIN UR ELPH-MCNC: 3.2 GM/DL
GLUCOSE SERPL-MCNC: 110 MG/DL (ref 65–99)
HCT VFR BLD AUTO: 42 % (ref 34–46.6)
HGB BLD-MCNC: 14.2 G/DL (ref 12–15.9)
IMM GRANULOCYTES # BLD AUTO: 0.08 10*3/MM3 (ref 0–0.05)
IMM GRANULOCYTES NFR BLD AUTO: 0.7 % (ref 0–0.5)
INR PPP: 1.01 (ref 0.91–1.09)
LYMPHOCYTES # BLD AUTO: 2.56 10*3/MM3 (ref 0.7–3.1)
LYMPHOCYTES NFR BLD AUTO: 21 % (ref 19.6–45.3)
MCH RBC QN AUTO: 29.5 PG (ref 26.6–33)
MCHC RBC AUTO-ENTMCNC: 33.8 G/DL (ref 31.5–35.7)
MCV RBC AUTO: 87.1 FL (ref 79–97)
MONOCYTES # BLD AUTO: 0.72 10*3/MM3 (ref 0.1–0.9)
MONOCYTES NFR BLD AUTO: 5.9 % (ref 5–12)
NEUTROPHILS NFR BLD AUTO: 70.2 % (ref 42.7–76)
NEUTROPHILS NFR BLD AUTO: 8.54 10*3/MM3 (ref 1.7–7)
NRBC BLD AUTO-RTO: 0 /100 WBC (ref 0–0.2)
PLATELET # BLD AUTO: 299 10*3/MM3 (ref 140–450)
PMV BLD AUTO: 10 FL (ref 6–12)
POTASSIUM SERPL-SCNC: 3.5 MMOL/L (ref 3.5–5.2)
PROT SERPL-MCNC: 6.9 G/DL (ref 6–8.5)
PROTHROMBIN TIME: 13.4 SECONDS (ref 11.8–14.8)
RBC # BLD AUTO: 4.82 10*6/MM3 (ref 3.77–5.28)
SODIUM SERPL-SCNC: 130 MMOL/L (ref 136–145)
WBC NRBC COR # BLD: 12.17 10*3/MM3 (ref 3.4–10.8)

## 2023-08-24 PROCEDURE — 25010000002 LABETALOL 5 MG/ML SOLUTION: Performed by: STUDENT IN AN ORGANIZED HEALTH CARE EDUCATION/TRAINING PROGRAM

## 2023-08-24 PROCEDURE — 25010000002 LABETALOL 5 MG/ML SOLUTION: Performed by: PHYSICIAN ASSISTANT

## 2023-08-24 PROCEDURE — 96366 THER/PROPH/DIAG IV INF ADDON: CPT

## 2023-08-24 PROCEDURE — 99284 EMERGENCY DEPT VISIT MOD MDM: CPT

## 2023-08-24 PROCEDURE — 85610 PROTHROMBIN TIME: CPT | Performed by: PHYSICIAN ASSISTANT

## 2023-08-24 PROCEDURE — 72148 MRI LUMBAR SPINE W/O DYE: CPT

## 2023-08-24 PROCEDURE — 96365 THER/PROPH/DIAG IV INF INIT: CPT

## 2023-08-24 PROCEDURE — 25010000002 FENTANYL CITRATE (PF) 50 MCG/ML SOLUTION: Performed by: FAMILY MEDICINE

## 2023-08-24 PROCEDURE — 96376 TX/PRO/DX INJ SAME DRUG ADON: CPT

## 2023-08-24 PROCEDURE — 80053 COMPREHEN METABOLIC PANEL: CPT | Performed by: STUDENT IN AN ORGANIZED HEALTH CARE EDUCATION/TRAINING PROGRAM

## 2023-08-24 PROCEDURE — 85025 COMPLETE CBC W/AUTO DIFF WBC: CPT | Performed by: STUDENT IN AN ORGANIZED HEALTH CARE EDUCATION/TRAINING PROGRAM

## 2023-08-24 PROCEDURE — 72146 MRI CHEST SPINE W/O DYE: CPT

## 2023-08-24 PROCEDURE — 25010000002 HYDROMORPHONE PER 4 MG: Performed by: EMERGENCY MEDICINE

## 2023-08-24 PROCEDURE — 96375 TX/PRO/DX INJ NEW DRUG ADDON: CPT

## 2023-08-24 PROCEDURE — 99284 EMERGENCY DEPT VISIT MOD MDM: CPT | Performed by: NURSE PRACTITIONER

## 2023-08-24 PROCEDURE — 25010000002 FENTANYL CITRATE (PF) 50 MCG/ML SOLUTION: Performed by: STUDENT IN AN ORGANIZED HEALTH CARE EDUCATION/TRAINING PROGRAM

## 2023-08-24 PROCEDURE — 85730 THROMBOPLASTIN TIME PARTIAL: CPT | Performed by: PHYSICIAN ASSISTANT

## 2023-08-24 PROCEDURE — 96361 HYDRATE IV INFUSION ADD-ON: CPT

## 2023-08-24 RX ORDER — SODIUM CHLORIDE 0.9 % (FLUSH) 0.9 %
10 SYRINGE (ML) INJECTION AS NEEDED
Status: DISCONTINUED | OUTPATIENT
Start: 2023-08-24 | End: 2023-08-25 | Stop reason: HOSPADM

## 2023-08-24 RX ORDER — SODIUM CHLORIDE, SODIUM LACTATE, POTASSIUM CHLORIDE, CALCIUM CHLORIDE 600; 310; 30; 20 MG/100ML; MG/100ML; MG/100ML; MG/100ML
125 INJECTION, SOLUTION INTRAVENOUS CONTINUOUS
Status: DISCONTINUED | OUTPATIENT
Start: 2023-08-24 | End: 2023-08-25 | Stop reason: HOSPADM

## 2023-08-24 RX ORDER — FENTANYL CITRATE 50 UG/ML
25 INJECTION, SOLUTION INTRAMUSCULAR; INTRAVENOUS ONCE
Status: COMPLETED | OUTPATIENT
Start: 2023-08-24 | End: 2023-08-24

## 2023-08-24 RX ORDER — HYDROMORPHONE HYDROCHLORIDE 1 MG/ML
0.5 INJECTION, SOLUTION INTRAMUSCULAR; INTRAVENOUS; SUBCUTANEOUS ONCE
Status: COMPLETED | OUTPATIENT
Start: 2023-08-24 | End: 2023-08-24

## 2023-08-24 RX ORDER — LABETALOL HYDROCHLORIDE 5 MG/ML
20 INJECTION, SOLUTION INTRAVENOUS ONCE
Status: COMPLETED | OUTPATIENT
Start: 2023-08-24 | End: 2023-08-24

## 2023-08-24 RX ORDER — LABETALOL HYDROCHLORIDE 5 MG/ML
10 INJECTION, SOLUTION INTRAVENOUS ONCE
Status: COMPLETED | OUTPATIENT
Start: 2023-08-24 | End: 2023-08-24

## 2023-08-24 RX ORDER — FENTANYL CITRATE 50 UG/ML
1 INJECTION, SOLUTION INTRAMUSCULAR; INTRAVENOUS ONCE
Status: COMPLETED | OUTPATIENT
Start: 2023-08-24 | End: 2023-08-24

## 2023-08-24 RX ADMIN — FENTANYL CITRATE 25 MCG: 50 INJECTION INTRAMUSCULAR; INTRAVENOUS at 16:17

## 2023-08-24 RX ADMIN — FENTANYL CITRATE 61 MCG: 50 INJECTION INTRAMUSCULAR; INTRAVENOUS at 11:48

## 2023-08-24 RX ADMIN — LABETALOL HYDROCHLORIDE 10 MG: 5 INJECTION INTRAVENOUS at 17:18

## 2023-08-24 RX ADMIN — SODIUM CHLORIDE, POTASSIUM CHLORIDE, SODIUM LACTATE AND CALCIUM CHLORIDE 125 ML/HR: 600; 310; 30; 20 INJECTION, SOLUTION INTRAVENOUS at 11:50

## 2023-08-24 RX ADMIN — HYDROMORPHONE HYDROCHLORIDE 0.5 MG: 1 INJECTION, SOLUTION INTRAMUSCULAR; INTRAVENOUS; SUBCUTANEOUS at 20:38

## 2023-08-24 RX ADMIN — FENTANYL CITRATE 25 MCG: 50 INJECTION INTRAMUSCULAR; INTRAVENOUS at 14:38

## 2023-08-24 RX ADMIN — FENTANYL CITRATE 25 MCG: 50 INJECTION INTRAMUSCULAR; INTRAVENOUS at 12:36

## 2023-08-24 RX ADMIN — SODIUM CHLORIDE, POTASSIUM CHLORIDE, SODIUM LACTATE AND CALCIUM CHLORIDE 500 ML: 600; 310; 30; 20 INJECTION, SOLUTION INTRAVENOUS at 17:19

## 2023-08-24 RX ADMIN — FENTANYL CITRATE 25 MCG: 50 INJECTION INTRAMUSCULAR; INTRAVENOUS at 19:24

## 2023-08-24 RX ADMIN — LABETALOL HYDROCHLORIDE 20 MG: 5 INJECTION INTRAVENOUS at 16:15

## 2023-08-24 RX ADMIN — NICARDIPINE HYDROCHLORIDE 5 MG/HR: 25 INJECTION, SOLUTION INTRAVENOUS at 20:14

## 2023-08-24 NOTE — Clinical Note
[facility] EMERGENCY DEPARTMENT  [address]  [facility] [address]  Phone: [phone]    Sol Fontenot was seen and treated in our emergency department on 8/24/2023.  She may return to work on 08/28/2023.         Thank you for choosing [facility].    Telma Diamond RN

## 2023-08-24 NOTE — ED PROVIDER NOTES
Subjective   History of Present Illness    Patient is a pleasant 58-year-old female who presents to ED with daughter.  Chief complaint is severe low back pain with numbness to her legs.    Patient describes that she has been experiencing what felt like a shooting sharp pain down her right leg for about a year or so.  She just tolerated it.  It was manageable at first.  However, in this past 1 month, she had increased severe pain that is constant.  Is particularly worse when she tries to sit up and she continues to feel pain down her right lower extremity.  She did inform her primary care provider about this with coinciding constipation.  Daughter felt like the constipation was the main focus.  Last week, she did complain about this and she was admitted to the hospital ultimately for further evaluation for hypertensive urgency, intramural aortic hematoma, continued constipation, and back pain.  She did complete a CT scan of her lumbar spine without contrast was read as follows:    CT Lumbar Spine Without Contrast [[id number]] Collected: 08/16/23 1255        Updated: 08/16/23 1303     Narrative:       CT LUMBAR SPINE WO CONTRAST- 8/16/2023 12:41 PM CDT     HISTORY: worsening low back pain      COMPARISON: None     DOSE LENGTH PRODUCT: 235 mGy cm. Automated exposure control was also  utilized to decrease patient radiation dose.     TECHNIQUE: Axial images lumbar spine obtained with sagittal coronal  reconstructions     FINDINGS:  The alignment of lumbar spine is appropriate. There is  osteopenia. There is advanced degenerative disc change along the right  L4/L5 disc with a slight left convexity of the lumbar curvature  measuring 10 degrees with apex at the L4 level. There is a prominent  right L5 transverse process articulates with the sacrum. There is no  compression deformity or acute lumbar vertebral fracture. There is  moderate lower lumbar facet arthropathy.     At L3/L4, there is mild broad-based disc bulging with  mild to moderate  facet arthropathy. Mild to moderate central canal stenosis with no  prominent foraminal narrowing.     At L4/L5, there is posterior endplate spurring and disc bulging with a  right foraminal predominance with moderate right mild left facet  arthropathy. There is moderate central canal stenosis with moderate  right foraminal narrowing.     At L5-S1, no prominent central canal stenosis. There is moderate left  foraminal narrowing related to posterior lateral endplate spurring and  mild to moderate facet arthropathy.     Mild to moderate vascular calcification.        Impression:       1. Osteopenia with mild left convexity of the degenerative lumbar spine.  There is moderate central canal stenosis at the L4/L5 level with  moderate right L4/L5 and left L5-S1 foraminal narrowing as described  above. No acute lumbar vertebral fracture or traumatic malalignment.  This report was finalized on 08/16/2023 13:00 by Dr. Kylie Toro MD.         The patient describes her other issues were the main focus and she was advised to follow with PCP.  She had an appointment with her PCP yesterday.  She received Toradol and steroids.  She has  also tried hydrocodone and Flexeril all which provides minimal relief.  Today, she noticed increased weakness in her lower extremities as she was ambulating to a point where she just collapsed.  She had to crawl into the house today.  She got up and went to shower.  While in the shower, she suddenly felt numbness to her entire waist down but was able to catch herself from hurting himself.  She regained sensation in her lower extremities.  She denies any bladder or bowel dysfunction.  She complains of temporary numbness to her groin but that has subsided.  With the worsening pain and symptoms, this concerned her so she can ER to be further evaluated.    Patient denies any recent illnesses.  She denies any fever.  She denies any needle injection.  She denies any other  trauma.    Review of Systems   Respiratory: Negative.     Gastrointestinal: Negative.    Musculoskeletal:  Positive for back pain and gait problem.   Skin: Negative.    Neurological:  Positive for weakness and numbness.   Psychiatric/Behavioral: Negative.     All other systems reviewed and are negative.    Past Medical History:   Diagnosis Date    Arthritis     Asthma     COPD (chronic obstructive pulmonary disease)     High cholesterol     High cholesterol     Kidney stone     Psoriasis        Allergies   Allergen Reactions    Codeine Hives    Keflex [Cephalexin] Hives    Ciprofloxacin Rash       Past Surgical History:   Procedure Laterality Date    COLONOSCOPY N/A 04/20/2021    Procedure: COLONOSCOPY WITH ANESTHESIA;  Surgeon: Ti Lea DO;  Location: [facility] ENDOSCOPY;  Service: Gastroenterology;  Laterality: N/A;  pre op: family hx polyps  post op:poor prep  PCP: Chip Hillman DO    COLONOSCOPY N/A 04/21/2021    Procedure: COLONOSCOPY WITH ANESTHESIA;  Surgeon: Ti Lea DO;  Location: [facility] ENDOSCOPY;  Service: Gastroenterology;  Laterality: N/A;  Pre: Family hx Colon Polyps  Post: Colon Polyp  Dr. Chip Hillman  CO2 Inflation Used    HYSTERECTOMY  1996    Complete    OOPHORECTOMY         Family History   Problem Relation Age of Onset    Heart disease Mother     Osteoporosis Mother     Alcohol abuse Father     Cancer Father     Osteoporosis Father     Osteoporosis Sister     Breast cancer Sister     Colon polyps Sister     Cancer Brother     Heart disease Brother     Osteoporosis Brother     Colon polyps Brother     Esophageal cancer Neg Hx        Social History     Socioeconomic History    Marital status:    Tobacco Use    Smoking status: Every Day     Packs/day: 1.00     Years: 30.00     Pack years: 30.00     Types: Cigarettes    Smokeless tobacco: Never   Vaping Use    Vaping Use: Never used   Substance and Sexual Activity    Alcohol use: No    Drug use: No    Sexual activity:  Yes     Partners: Male     Birth control/protection: Surgical       Prior to Admission medications    Medication Sig Start Date End Date Taking? Authorizing Provider   amLODIPine (NORVASC) 10 MG tablet Take 1 tablet by mouth Daily. 8/22/23   Walter Headley APRN   B Complex Vitamins (Vitamin B Complex) tablet Take 1 tablet by mouth Daily.    Pam Mendez MD   cyclobenzaprine (FLEXERIL) 10 MG tablet Take 1 tablet by mouth 3 (Three) Times a Day As Needed for Muscle Spasms. 8/9/23   Lauren Chapman APRN   Humira Pen 40 MG/0.8ML Pen-injector Kit Inject 40 mg under the skin into the appropriate area as directed Every 14 (Fourteen) Days. 3/19/21   Pam Mendez MD   HYDROcodone-acetaminophen (NORCO) 5-325 MG per tablet Take 1 tablet by mouth Every 6 (Six) Hours As Needed for Moderate Pain. 8/9/23   Lauren Chapman APRN   ketoconazole (NIZORAL) 2 % cream Apply 1 application  topically to the appropriate area as directed 2 (Two) Times a Day.    Pam Mendez MD   lisinopril (PRINIVIL,ZESTRIL) 20 MG tablet Take 1 tablet by mouth Daily. 8/22/23   Walter Headley APRN   Naloxegol Oxalate (MOVANTIK) 25 MG tablet Take 1 tablet by mouth Daily. 8/22/23   Walter Headley APRN   sennosides-docusate (PERICOLACE) 8.6-50 MG per tablet Take 2 tablets by mouth Every Night. Obtain OTC 8/21/23   Walter Headley APRN   vitamin B-12 (CYANOCOBALAMIN) 1000 MCG tablet Take 1 tablet by mouth Daily. 1/25/21   Pam Mendez MD   vitamin D (ERGOCALCIFEROL) 1.25 MG (77264 UT) capsule capsule Take 1 capsule by mouth Every 7 (Seven) Days. 6/1/21   Pam Mendez MD   vitamin D3 125 MCG (5000 UT) capsule capsule Take 1 capsule by mouth Daily.    Pam Mendez MD       Medications   sodium chloride 0.9 % flush 10 mL (has no administration in time range)   lactated ringers infusion (125 mL/hr Intravenous New Bag 8/24/23 1150)   niCARdipine (CARDENE) 25 mg in 250 mL NS infusion (0 mg/hr  "Intravenous Hold 8/24/23 1928)   fentaNYL citrate (PF) (SUBLIMAZE) injection 61 mcg (61 mcg Intravenous Given 8/24/23 1148)   lactated ringers bolus 500 mL (0 mL Intravenous Stopped 8/24/23 1900)   fentaNYL citrate (PF) (SUBLIMAZE) injection 25 mcg (25 mcg Intravenous Given 8/24/23 1236)   fentaNYL citrate (PF) (SUBLIMAZE) injection 25 mcg (25 mcg Intravenous Given 8/24/23 1438)   labetalol (NORMODYNE,TRANDATE) injection 20 mg (20 mg Intravenous Given 8/24/23 1615)   fentaNYL citrate (PF) (SUBLIMAZE) injection 25 mcg (25 mcg Intravenous Given 8/24/23 1617)   labetalol (NORMODYNE,TRANDATE) injection 10 mg (10 mg Intravenous Given 8/24/23 1718)   fentaNYL citrate (PF) (SUBLIMAZE) injection 25 mcg (25 mcg Intravenous Given 8/24/23 1924)       /74   Pulse 84   Temp 98.3 øF (36.8 øC)   Resp 18   Ht 175.3 cm (69\")   Wt 61.2 kg (135 lb)   SpO2 100%   BMI 19.94 kg/mý       Objective   Physical Exam  Vitals and nursing note reviewed. Exam conducted with a chaperone present.   Constitutional:       General: She is not in acute distress.     Appearance: She is well-developed. She is not diaphoretic.   HENT:      Head: Normocephalic and atraumatic.   Eyes:      Conjunctiva/sclera: Conjunctivae normal.      Pupils: Pupils are equal, round, and reactive to light.   Neck:      Trachea: No tracheal deviation.   Cardiovascular:      Rate and Rhythm: Normal rate and regular rhythm.      Heart sounds: Normal heart sounds. No murmur heard.  Pulmonary:      Effort: Pulmonary effort is normal.      Breath sounds: Normal breath sounds.   Abdominal:      General: Bowel sounds are normal. There is no distension.      Palpations: Abdomen is soft. There is no mass.      Tenderness: There is no abdominal tenderness. There is no guarding or rebound.   Genitourinary:     Rectum: Abnormal anal tone.      Comments: Diminished anal tone  Musculoskeletal:      Cervical back: Normal range of motion and neck supple.      Comments: Patient " was lying supine with her head elevated about 45 degrees when I first walked in the room.  When I attempted to sit her upright to evaluate her spine, she screamed in pain.  She was able to roll to her left side and had pain to palpation on her left lower paralumbar region   Skin:     General: Skin is warm and dry.   Neurological:      General: No focal deficit present.      Mental Status: She is alert and oriented to person, place, and time.      Deep Tendon Reflexes: Reflexes are normal and symmetric.      Reflex Scores:       Patellar reflexes are 2+ on the right side and 2+ on the left side.  Psychiatric:         Mood and Affect: Mood normal.         Behavior: Behavior normal.         Thought Content: Thought content normal.         Judgment: Judgment normal.       Procedures         Lab Results (last 24 hours)       Procedure Component Value Units Date/Time    Comprehensive Metabolic Panel [[id number]]  (Abnormal) Collected: 08/24/23 1140    Specimen: Blood Updated: 08/24/23 1212     Glucose 110 mg/dL      BUN 22 mg/dL      Creatinine 1.31 mg/dL      Sodium 130 mmol/L      Potassium 3.5 mmol/L      Chloride 93 mmol/L      CO2 24.0 mmol/L      Calcium 9.7 mg/dL      Total Protein 6.9 g/dL      Albumin 3.7 g/dL      ALT (SGPT) 47 U/L      AST (SGOT) 23 U/L      Alkaline Phosphatase 75 U/L      Total Bilirubin 0.8 mg/dL      Globulin 3.2 gm/dL      A/G Ratio 1.2 g/dL      BUN/Creatinine Ratio 16.8     Anion Gap 13.0 mmol/L      eGFR 47.6 mL/min/1.73     Narrative:      GFR Normal >60  Chronic Kidney Disease <60  Kidney Failure <15      CBC & Differential [[id number]]  (Abnormal) Collected: 08/24/23 1140    Specimen: Blood Updated: 08/24/23 1150    Narrative:      The following orders were created for panel order CBC & Differential.  Procedure                               Abnormality         Status                     ---------                               -----------         ------                     CBC Auto  Differential[[id number]]        Abnormal            Final result                 Please view results for these tests on the individual orders.    CBC Auto Differential [[id number]]  (Abnormal) Collected: 08/24/23 1140    Specimen: Blood Updated: 08/24/23 1150     WBC 12.17 10*3/mm3      RBC 4.82 10*6/mm3      Hemoglobin 14.2 g/dL      Hematocrit 42.0 %      MCV 87.1 fL      MCH 29.5 pg      MCHC 33.8 g/dL      RDW 14.0 %      RDW-SD 42.6 fl      MPV 10.0 fL      Platelets 299 10*3/mm3      Neutrophil % 70.2 %      Lymphocyte % 21.0 %      Monocyte % 5.9 %      Eosinophil % 1.6 %      Basophil % 0.6 %      Immature Grans % 0.7 %      Neutrophils, Absolute 8.54 10*3/mm3      Lymphocytes, Absolute 2.56 10*3/mm3      Monocytes, Absolute 0.72 10*3/mm3      Eosinophils, Absolute 0.20 10*3/mm3      Basophils, Absolute 0.07 10*3/mm3      Immature Grans, Absolute 0.08 10*3/mm3      nRBC 0.0 /100 WBC     Protime-INR [[id number]]  (Normal) Collected: 08/24/23 1601    Specimen: Blood Updated: 08/24/23 1632     Protime 13.4 Seconds      INR 1.01    aPTT [[id number]]  (Normal) Collected: 08/24/23 1601    Specimen: Blood Updated: 08/24/23 1632     PTT 26.4 seconds             CT Lumbar Spine Without Contrast    Result Date: 8/16/2023  Narrative: CT LUMBAR SPINE WO CONTRAST- 8/16/2023 12:41 PM CDT  HISTORY: worsening low back pain  COMPARISON: None  DOSE LENGTH PRODUCT: 235 mGy cm. Automated exposure control was also utilized to decrease patient radiation dose.  TECHNIQUE: Axial images lumbar spine obtained with sagittal coronal reconstructions  FINDINGS:  The alignment of lumbar spine is appropriate. There is osteopenia. There is advanced degenerative disc change along the right L4/L5 disc with a slight left convexity of the lumbar curvature measuring 10 degrees with apex at the L4 level. There is a prominent right L5 transverse process articulates with the sacrum. There is no compression deformity or acute lumbar vertebral fracture.  There is moderate lower lumbar facet arthropathy.  At L3/L4, there is mild broad-based disc bulging with mild to moderate facet arthropathy. Mild to moderate central canal stenosis with no prominent foraminal narrowing.  At L4/L5, there is posterior endplate spurring and disc bulging with a right foraminal predominance with moderate right mild left facet arthropathy. There is moderate central canal stenosis with moderate right foraminal narrowing.  At L5-S1, no prominent central canal stenosis. There is moderate left foraminal narrowing related to posterior lateral endplate spurring and mild to moderate facet arthropathy.  Mild to moderate vascular calcification.      Impression: 1. Osteopenia with mild left convexity of the degenerative lumbar spine. There is moderate central canal stenosis at the L4/L5 level with moderate right L4/L5 and left L5-S1 foraminal narrowing as described above. No acute lumbar vertebral fracture or traumatic malalignment. This report was finalized on 08/16/2023 13:00 by Dr. Kylie Toro MD.    CT Angiogram Abdomen Pelvis    Result Date: 8/9/2023  Narrative: CT ANGIOGRAM ABDOMEN PELVIS- 8/9/2023 1:19 PM CDT  HISTORY: r/o dissection/sudden onset abd and chest pain with radiation into back  COMPARISON: CT abdomen and pelvis 9/4/2018.  DOSE LENGTH PRODUCT: 265 mGy cm. Automated exposure control was also utilized to decrease patient radiation dose.  TECHNIQUE: Helical tomographic images of the abdomen and pelvis utilizing angiographic protocol were obtained following the intravenous infusion of contrast. Multiplanar and 3 D reformatted images were provided for review.  FINDINGS:  Lower chest: Please see separate dictated report.  Liver: No arterially enhancing lesion.  No other system: Unremarkable.  Spleen: Unremarkable.  Pancreas: Unremarkable.  Adrenal glands: Unremarkable.  Kidneys/ureters/bladder: Mildly distended bladder.  Reproductive organs: Status post hysterectomy. No adnexal  masses.  GI system: Borderline circumferential wall thickening of the transverse colon which is underdistended. No significant adjacent inflammatory changes.  Peritoneum/retroperitoneum: Unremarkable.  Lymph nodes: Unremarkable.  Vasculature: Moderate calcified atherosclerosis of the aorta and branching vessels. No aneurysm. No dissection. Celiac axis, SMA, and JEFE are patent. 2 right and 2 left renal arteries which are patent.  Soft tissues: Unremarkable.  Osseous structures: No acute or suspicious osseous finding. Degenerative disc disease.      Impression:  No aortic aneurysm or dissection.  No definite acute findings in the abdomen/pelvis.  Mild circumferential thickening of the transverse colon which may be due to partially collapsed state. Correlate for mild colitis.  Mildly distended bladder.  This report was finalized on 08/09/2023 13:53 by  Parminder Sanchez DO.    MRI Thoracic Spine Without Contrast    Result Date: 8/24/2023  Narrative: MRI THORACIC SPINE WO CONTRAST- 8/24/2023 12:41 PM CDT  HISTORY: severe back pain numbness fall  COMPARISON : NONE  TECHNIQUE: Multiplanar, multisequence MRI of the thoracic spine was obtained without the use of contrast.  FINDINGS:  The spine is imaged from C7 through L1. Thoracic kyphosis is preserved. No acute fracture identified. Vertebral body heights are well-maintained. Underlying apex left thoracic curvature. Multilevel loss of disc height. No discrete disc herniation identified. There is intradural blood product identified at several levels, notably the T5-T6 vertebral levels (series 601-image 13, series 701-image 14) and again at and below the T10 vertebral level (series 601-image 10, series 701-image 10). This images as early to late subacute blood product based on the signal characteristics. The intrinsically T1 hyperintense blood products at T11-T12 with the most notable area. Notable mass effect on the left dorsal lateral cord at the T11 level (series 1201-image  15), displacing the cord anteriorly. No compressive myelopathy identified. Posterior elements are intact. No ligamentous injury identified. Paraspinal soft tissues are unremarkable.      Impression: 1. Multiple segments of early-to-late subacute intradural blood product identified in the thoracic and lumbar spine, seen as high as the T5 vertebral level and as low as the S1 level. Most of this blood product appears to be in the subdural space, although it is possible that there is both subarachnoid space and subdural blood product. There is a notable compression on the left dorsal lateral cord at the T11 vertebral level without obvious compressive myelopathy. 2. No visualized acute fracture, ligamentous injury or subluxation.  Findings were discussed with LINA CUNNINGHAM on 8/24/2023 at 2:46 PM CDT.   This report was finalized on 08/24/2023 14:46 by Dr Brett Reyes, .    MRI Lumbar Spine Without Contrast    Result Date: 8/24/2023  Narrative: EXAMINATION: MRI LUMBAR SPINE WO CONTRAST-  8/24/2023 1:25 PM CDT  HISTORY: severe back pain numbness fall  The multiplanar, multisequence MR imaging of the lumbar spine is performed without intravenous contrast enhancement.  There is no previous similar study for comparison.  The correlation made with CT scan of the lumbar spine dated 08/16/2023 and MR imaging of the thoracic spine obtained earlier today.  There is a layering the T1 high density fluid along the anterior aspect of the thecal sac seen extending from the vertebra T12-S1 which measures approximately 4 mm opposite L1, several millimeter opposite L4 and 7 mm opposite S1. There is extrinsic pressure on thecal sac and cauda equina nerve roots with bundling/bunching of the nerve roots.  Also noted is similar T1 high density material/fluid located posteriorly in the limited visualized thoracic spine, which measures 6 mm opposite and posterior to the disc space T12-L1. Correlation with imaging of the thoracic spine suggest  this to be involving the thoracic thecal sac. These represent an intrathecal/subdural hemorrhage/hematoma.  There is normal curvature and alignment.  The vertebral body heights are normal. No fracture noted.  There is loss of height and signal of the intervertebral discs at L4-5 and L5-S1 representing degenerative disc disease.  There are type II discogenic degeneration of the endplates at L4-5.  The bone marrow signal of the vertebral bodies and the posterior elements are normal. No focal edema or contusion.  L1-2: No disc bulging or herniation. There is moderate bilateral facet arthropathy. Neural foramina or spinal canal are patent.  L2-3: Small posterior osteophytes. Mild diffuse disc bulging. There is bilateral facet arthropathy and ligamentum hypertrophy. The neural foramina or spinal canal are patent.  L3-4: Moderate diffuse disc bulging. Small posterior osteophytes. There is bilateral facet arthropathy and ligamentum hypertrophy. There is moderate spinal stenosis. Neural foramina are patent.  L4-5: Prominent posterior osteophytes. Mild disc bulging. Moderate is severe facet arthropathy. Moderate ligamentous hypertrophy. There is moderately severe spinal stenosis. Moderate narrowing of the neural foramina bilaterally.  L5-S1: Mild diffuse disc bulging. There is bilateral facet arthropathy. There is moderate left neural foraminal stenosis. Spinal canal is patent.  The thoracic spine is separately reviewed and reported.  Limited visualized conus medullaris appear unremarkable. The status of the thecal sac have been described above.      Impression: 1. Evidence of intrathecal/subdural hemorrhage/hematoma extending throughout the lumbosacral spine with significant compression on the cauda equina nerve roots as detailed above. This may be due to previous trauma, iatrogenic or accidental. 2. Lumbar spondylosis. 3. No acute fracture, bone contusion or edema. 4. Multilevel prominent disc osteophyte complexes, facet  arthropathy and ligamentous hypertrophy and resultant neural foramina or spinal canal stenosis.  The above report was communicated with the ER physician.   This report was finalized on 08/24/2023 14:48 by Dr. Yumi Overton MD.    CT Abdomen Pelvis With Contrast    Result Date: 8/16/2023  Narrative: EXAM/TECHNIQUE: CT abdomen pelvis with IV contrast  INDICATION: Abdominal pain, constipation, vomiting  COMPARISON: 08/09/2023  DLP: 218 mGy cm. Automated exposure control was also utilized to decrease patient radiation dose.  FINDINGS:  The lung bases are clear.  No suspicious focal liver lesion. No gallstones or gallbladder wall thickening. No biliary ductal dilatation. Spleen, pancreas, and adrenal glands are unremarkable. No solid renal mass. No urolithiasis or hydronephrosis. No focal urinary bladder wall thickening.  No colonic wall thickening or pericolonic fat stranding. Normal appendix. Moderate volume stool throughout the colon. No small bowel distention or evidence of active small bowel inflammation.  No ascites or free pelvic fluid. No pelvic mass or pelvic collection. Prior hysterectomy.  Nonaneurysmal atherosclerotic abdominal aorta. There is new eccentric wall thickening along the anterior abdominal aorta beginning at the mid abdominal aorta at the level of the renal vein, extending to the distal abdominal aorta and into the RIGHT common iliac artery, highly concerning for new intramural hematoma. This area of thickening measures up to 6 mm thickness. No aortic dissection flap identified. No penetrating atherosclerotic ulcer identified. The celiac artery SMA, and JEFE are widely patent. Renal arteries are widely patent.  No enlarged retroperitoneal, mesenteric, pelvic, or inguinal lymph nodes.  No acute abdominal wall soft tissue abnormality. Multilevel lumbar spine degenerative change.      Impression:  1.  New eccentric thickening along the anterior abdominal aorta beginning at the mid abdominal aorta  and extending distally into the RIGHT common iliac artery. This is favored to represent an acute intramural hematoma. Of note, this finding was not present on a CT performed recently on 08/09/2023.  2.  Moderate volume stool throughout the colon. Correlate for constipation.  --- These findings were discussed with SRIDHAR SHARMA on 8/16/2023 1:18 PM CDT by Dr. Chris Us. This report was finalized on 08/16/2023 13:21 by Dr. Chris Us MD.    XR Chest 1 View    Result Date: 8/9/2023  Narrative: EXAMINATION:  XR CHEST 1 VW-  8/9/2023 12:15 PM CDT  HISTORY: Shortness of air. Chest tightness.  COMPARISON: 01/11/2022.  TECHNIQUE: Single view AP image.  FINDINGS:  The lungs are expanded bilaterally and clear. There is stable mild bronchial wall thickening. The pleural spaces are clear. Heart size is normal. No acute bony abnormality is seen.       Impression: No active disease is seen.   This report was finalized on 08/09/2023 12:36 by Dr. Adi Christine MD.    CT Angiogram Chest    Result Date: 8/16/2023  Narrative: CT ANGIOGRAM CHEST- 8/16/2023 1:40 PM CDT  HISTORY: abnormal abd CT, r/o dissection  COMPARISON: CT angiogram dated 08/09/2023.  DOSE LENGTH PRODUCT: 218 mGy cm. Automated exposure control was also utilized to decrease patient radiation dose.  TECHNIQUE: Helical tomographic images of the chest were obtained after the administration of intravenous contrast following angiogram protocol. Additionally, 3D and multiplanar reformatted images were provided.    FINDINGS:  Pulmonary arteries: There is adequate enhancement of the pulmonary arteries to evaluate for central and segmental pulmonary emboli. There are no filling defects within the main, lobar, segmental or visualized subsegmental pulmonary arteries. The pulmonary arteries are within normal limits for size.  Aorta and great vessels: Thoracic aorta is normal in caliber. No dissection identified. No flow-limiting stenosis identified at the great vessel  origins. No intramural hematoma.  Visualized neck base: The imaged portion of the base of the neck appears unremarkable.  Lungs: Paraseptal emphysema. Lungs are clear and well expanded. No pleural effusion. Airways are clear.  Heart: The heart is normal in size. There is no pericardial effusion. Mild coronary atheromatous calcification.  Mediastinum and lymph nodes: No suspicious hilar or mediastinal adenopathy.  Skeletal and soft tissues: Chest wall soft tissues are unremarkable. Osteopenia. Exaggerated thoracic kyphosis. Vertebral body heights are well-maintained.       Impression: 1. No acute aortic pathology or other acute cardiopulmonary process. Thoracic aorta is nondilated. 2. Paraseptal emphysema.   This report was finalized on 08/16/2023 14:16 by Dr Brett Reyes, .    CT Angiogram Chest    Result Date: 8/9/2023  Narrative: CT ANGIOGRAM CHEST- 8/9/2023 1:19 PM CDT  HISTORY: r/o dissection/ sudden onset dyspnea/chest pressure/ radiating into back  COMPARISON: CT abdomen and pelvis 9/4/2018.  DOSE LENGTH PRODUCT: 265 mGy cm. Automated exposure control was also utilized to decrease patient radiation dose.  TECHNIQUE: Serial helical tomographic images of the chest were acquired following the intravenous infusion of contrast according to angiographic protocol. Multiplanar reformatted images were provided for review.  FINDINGS:  Central airways: Patent.  LUNGS: Scarring/atelectasis in the right lung base. Mild paraseptal emphysema changes.  Pleura: No pleural effusion or pneumothorax.  Lower neck: Unremarkable.  Mediastinum/hilum: No enlarged lymph adenopathy.  HEART: Normal in size. No pericardial effusion.  Vasculature: The aorta is normal in caliber. No dissection. Main pulmonary artery is normal in caliber. No pulmonary artery filling defect.  Soft tissues: Unremarkable.  Upper abdomen: Please see separately dictated report.  Osseous structures: No acute or suspicious osseous finding.      Impression:  No  aneurysm, dissection, or pulmonary embolism.  No acute findings in the chest.   This report was finalized on 08/09/2023 13:46 by  Parminder Sanchez DO.     ED Course  ED Course as of 08/24/23 1950   Thu Aug 24, 2023   1620 I did speak with KYRA De eJsus, covering neurosurgical nurse practitioner with Dr. Horner about the case.  Dr. Horner came down and saw the patient as well.  Dr. Horner states he does not do the procedure that the patient needs and recommends a higher level of care.  Patient and daughter, xochitl, have been educated of the abnormal findings on her MRI consistent with spinal equina syndrome and subdural spinal hemorrhage.  She requests that we call [place].   [TK]   1637 I did speak with the [place] transfer center.  They are at capacity.  They will connect this with [facility] and I spoke with Dr. Vann, hospitalist, and Dr. Haney, neurosurgeon, who are both gracious to accept and consult the patient respectively at their facility.  Patient will be placed in the ICU. [TK]   1706 Kylie with [facility] transfer [place] did call back and said that the ICU was at full capacity.  They request any change in neuro status be informed immediately.  I have did inform them when Dr. Horner evaluated patient, he requested Barbosa catheter replacement.  That was completed and the patient did have 900 cc of urine returned.  She still has a catheter in place.  Her blood pressure has improved with labetalol.  We will do neurochecks every hour and contact them if there are any changes. [TK]      ED Course User Index  [TK] Jc Campos PA MDM     Amount and/or Complexity of Data Reviewed  Decide to obtain previous medical records or to obtain history from someone other than the patient: yes        Final diagnoses:   Cauda equina spinal cord injury, initial encounter   Subdural hemorrhage   Essential hypertension   ALEXANDRA (acute kidney injury)     Position: Patient will be transferred  to a higher level care in critical condition     Jc Campos PA  08/24/23 1638       Jc Campos PA  08/24/23 2008

## 2023-08-24 NOTE — ED NOTES
"Pt states she is \"not able to feel anything down there\" in reference to her bilateral legs. Pt says touch feels the same on thighs, but when touching the calves at the same time, the right side \"feels like it's spasming in my leg\". The left side felt normal. The feet \"feel tingly.\" Patient is saying her upper back and shoulders are not hurting. Family at bedside.   "

## 2023-08-24 NOTE — CONSULTS
NEUROSURGERY INITIAL HOSPITAL ENCOUNTER    Assessment/Plan:   Sol Fontenot is a 58 y.o. female with a significant medical history of uncontrolled hypertension (recently admitted for hypertension urgency and found to have an intramural hematoma from aorta to right iliac), COPD asthma, and tobacco abuse.  She presents with a new problem of progressively worsening lumbar back and right leg pain and recurrent falls. Physical exam findings of increased lower extremity tone, lower extremity hyperreflexia, and right Babinski.  Their imaging shows intradural blood products from T5-S1; no significant central canal  stenosis throughout the thoracic or lumbar spine, and bladder distention.    Differential Diagnosis:   Spinal dural AVF or spinal AVM  Less likely, intramedullary neoplasm    Recommendations:  Considering her potential need for endovascular treatment, for continuity of care we highly recommend urgent referral to a tertiary care center; [facility], [facility].  Place Barbosa catheter for urinary retention.    I discussed the patients findings and my recommendations with patient, family, and consulting provider    Thank you very much for this interesting consult.     Level of Risk: High  MDM: High  Mod = 03788, Bkog=00082  ___________________________________________________________________    Reason for consult: Abnormal imaging of the thoracic and lumbar spine, lumbar back pain, lower extremity weakness and recurrent falls  Consultation at the request of ED provider, SWETA Howard    Chief Complaint:   Chief Complaint   Patient presents with    Back Pain    Numbness     HPI: Sol Fontenot is a 58 y.o. female with a significant medical history of uncontrolled hypertension (recently admitted for hypertension urgency and found to have an intramural hematoma from aorta to right iliac), COPD asthma, and tobacco abuse.  She presents with a new problem of progressively worsening lumbar back and right leg pain and  recurrent falls.  No previous spine surgeries.  No known injury.    Gradual onset of lumbar back pain over the past year that progressively worsened 1 month ago while attempting to have a bowel movement.  Her lumbar back pain is currently constant, waxing and waning in severity.  She additionally reports radicular pain to the lateral right thigh with numbness and tingling to the posterior right leg and plantar surface of her foot.  Her back pain worsens with physical activity to include standing and walking and decreases to some extent with lying down.  She does state that when she stands to walk her legs become weak resulting in recurrent falls.  Her last fall occurred this morning while at her PCPs office.  She denies fevers, chills, night sweats, or bowel or bladder incontinence, however reports a decrease in rectal stimulation, as well as the decreased sensation to void or have a bowel movement.  She currently rates the severity of her symptoms 8/10.  No additional concerns at this time.    Review of Systems   HENT: Negative.     Eyes: Negative.    Respiratory: Negative.     Cardiovascular: Negative.    Gastrointestinal: Negative.    Endocrine: Negative.    Genitourinary: Negative.    Musculoskeletal:  Positive for back pain.   Skin: Negative.    Allergic/Immunologic: Negative.    Neurological:  Positive for weakness and numbness.   Hematological: Negative.    Psychiatric/Behavioral: Negative.     All other systems reviewed and are negative.     Past Medical History:  has a past medical history of Arthritis, Asthma, COPD (chronic obstructive pulmonary disease), and Kidney stone.    Past Surgical History:  has a past surgical history that includes Oophorectomy; Hysterectomy (1996); Colonoscopy (N/A, 04/20/2021); and Colonoscopy (N/A, 04/21/2021).    Family History: family history includes Alcohol abuse in her father; Breast cancer in her sister; Cancer in her brother and father; Colon polyps in her brother and  sister; Heart disease in her brother and mother; Osteoporosis in her brother, father, mother, and sister.    Social History:  reports that she has been smoking cigarettes. She has a 30.00 pack-year smoking history. She has never used smokeless tobacco. She reports that she does not drink alcohol and does not use drugs.    Allergies: Codeine, Keflex [cephalexin], and Ciprofloxacin    Home Medications:   Current Facility-Administered Medications:     fentaNYL citrate (PF) (SUBLIMAZE) injection 25 mcg, 25 mcg, Intravenous, Once, Connie Arora MD    labetalol (NORMODYNE,TRANDATE) injection 20 mg, 20 mg, Intravenous, Once, Connie Arora MD    lactated ringers bolus 500 mL, 500 mL, Intravenous, Once, Connie Arora MD    lactated ringers infusion, 125 mL/hr, Intravenous, Continuous, Connie Arora MD, Last Rate: 125 mL/hr at 08/24/23 1150, 125 mL/hr at 08/24/23 1150    [COMPLETED] Insert Peripheral IV, , , Once **AND** sodium chloride 0.9 % flush 10 mL, 10 mL, Intravenous, PRN, Connie Arora MD    Current Outpatient Medications:     amLODIPine (NORVASC) 10 MG tablet, Take 1 tablet by mouth Daily., Disp: 30 tablet, Rfl: 0    B Complex Vitamins (Vitamin B Complex) tablet, Take 1 tablet by mouth Daily., Disp: , Rfl:     cyclobenzaprine (FLEXERIL) 10 MG tablet, Take 1 tablet by mouth 3 (Three) Times a Day As Needed for Muscle Spasms., Disp: 20 tablet, Rfl: 0    Humira Pen 40 MG/0.8ML Pen-injector Kit, Inject 40 mg under the skin into the appropriate area as directed Every 14 (Fourteen) Days., Disp: , Rfl:     HYDROcodone-acetaminophen (NORCO) 5-325 MG per tablet, Take 1 tablet by mouth Every 6 (Six) Hours As Needed for Moderate Pain., Disp: 12 tablet, Rfl: 0    ketoconazole (NIZORAL) 2 % cream, Apply 1 application  topically to the appropriate area as directed 2 (Two) Times a Day., Disp: , Rfl:     lisinopril (PRINIVIL,ZESTRIL) 20 MG tablet, Take 1 tablet by mouth Daily., Disp: 30 tablet, Rfl: 0    Naloxegol Oxalate  (MOVANTIK) 25 MG tablet, Take 1 tablet by mouth Daily., Disp: 30 tablet, Rfl: 0    sennosides-docusate (PERICOLACE) 8.6-50 MG per tablet, Take 2 tablets by mouth Every Night. Obtain OTC, Disp: , Rfl:     vitamin B-12 (CYANOCOBALAMIN) 1000 MCG tablet, Take 1 tablet by mouth Daily., Disp: , Rfl:     vitamin D (ERGOCALCIFEROL) 1.25 MG (41426 UT) capsule capsule, Take 1 capsule by mouth Every 7 (Seven) Days., Disp: , Rfl:     vitamin D3 125 MCG (5000 UT) capsule capsule, Take 1 capsule by mouth Daily., Disp: , Rfl:     Medications: Scheduled Meds:fentaNYL citrate (PF), 25 mcg, Intravenous, Once  labetalol, 20 mg, Intravenous, Once  lactated ringers, 500 mL, Intravenous, Once      Continuous Infusions:lactated ringers, 125 mL/hr, Last Rate: 125 mL/hr (08/24/23 1150)      PRN Meds:.  [COMPLETED] Insert Peripheral IV **AND** sodium chloride    Vital Signs  Temp:  [98.3 øF (36.8 øC)] 98.3 øF (36.8 øC)  Heart Rate:  [] 85  Resp:  [18] 18  BP: (113-177)/() 177/111    Physical Exam  Physical Exam  Vitals and nursing note reviewed.   Constitutional:       General: She is not in acute distress.     Appearance: Normal appearance. She is well-developed, well-groomed and normal weight. She is not ill-appearing, toxic-appearing or diaphoretic.   HENT:      Head: Normocephalic and atraumatic.      Right Ear: Hearing normal.      Left Ear: Hearing normal.   Eyes:      Extraocular Movements: EOM normal.      Conjunctiva/sclera: Conjunctivae normal.      Pupils: Pupils are equal, round, and reactive to light.   Neck:      Trachea: Trachea normal.   Cardiovascular:      Rate and Rhythm: Normal rate and regular rhythm.   Pulmonary:      Effort: Pulmonary effort is normal. No tachypnea, bradypnea, accessory muscle usage or respiratory distress.   Abdominal:      Palpations: Abdomen is soft.   Musculoskeletal:      Cervical back: Full passive range of motion without pain and neck supple.   Skin:     General: Skin is warm and  dry.   Neurological:      Mental Status: She is alert and oriented to person, place, and time.      GCS: GCS eye subscore is 4. GCS verbal subscore is 5. GCS motor subscore is 6.      Deep Tendon Reflexes:      Reflex Scores:       Tricep reflexes are 2+ on the right side and 2+ on the left side.       Bicep reflexes are 2+ on the right side and 2+ on the left side.       Brachioradialis reflexes are 2+ on the right side and 2+ on the left side.       Patellar reflexes are 3+ on the right side and 3+ on the left side.       Achilles reflexes are 3+ on the right side and 3+ on the left side.  Psychiatric:         Speech: Speech normal.         Behavior: Behavior normal. Behavior is cooperative.       Neurologic Exam     Mental Status   Oriented to person, place, and time.   Attention: normal. Concentration: normal.   Speech: speech is normal   Level of consciousness: alert    Cranial Nerves     CN II   Visual fields full to confrontation.     CN III, IV, VI   Pupils are equal, round, and reactive to light.  Extraocular motions are normal.     CN V   Facial sensation intact.     CN VII   Facial expression full, symmetric.     CN VIII   CN VIII normal.     CN IX, X   CN IX normal.     CN XI   CN XI normal.     Motor Exam   Right arm tone: normal  Left arm tone: normal  Right leg tone: increased  Left leg tone: increased    Strength   Right deltoid: 5/5  Left deltoid: 5/5  Right biceps: 5/5  Left biceps: 5/5  Right triceps: 5/5  Left triceps: 5/5  Right wrist extension: 5/5  Left wrist extension: 5/5  Right iliopsoas: 5/5  Left iliopsoas: 5/5  Right quadriceps: 5/5  Left quadriceps: 5/5  Right anterior tibial: 5/5  Left anterior tibial: 5/5  Right gastroc: 5/5  Left gastroc: 5/5  Right EHL 5/5  Left EHL 5/5       Sensory Exam   Right arm light touch: normal  Left arm light touch: normal  Right leg light touch: normal  Left leg light touch: normal    Gait, Coordination, and Reflexes     Tremor   Resting tremor:  absent  Intention tremor: absent  Action tremor: absent    Reflexes   Right brachioradialis: 2+  Left brachioradialis: 2+  Right biceps: 2+  Left biceps: 2+  Right triceps: 2+  Left triceps: 2+  Right patellar: 3+  Left patellar: 3+  Right achilles: 3+  Left achilles: 3+  Right plantar: upgoing  Left plantar: normal  Right Holguin: absent  Left Holguin: absent  Right ankle clonus: absent  Left ankle clonus: absent  Right pendular knee jerk: absent  Left pendular knee jerk: absent    Results Review:   Independent review and interpretation of imaging  Imaging Results (Last 24 Hours)       Procedure Component Value Units Date/Time    MRI Lumbar Spine Without Contrast [[id number]] Collected: 08/24/23 1353     Updated: 08/24/23 1451    Narrative:      EXAMINATION: MRI LUMBAR SPINE WO CONTRAST-     8/24/2023 1:25 PM CDT     HISTORY: severe back pain numbness fall     The multiplanar, multisequence MR imaging of the lumbar spine is  performed without intravenous contrast enhancement.     There is no previous similar study for comparison.     The correlation made with CT scan of the lumbar spine dated 08/16/2023  and MR imaging of the thoracic spine obtained earlier today.     There is a layering the T1 high density fluid along the anterior aspect  of the thecal sac seen extending from the vertebra T12-S1 which measures  approximately 4 mm opposite L1, several millimeter opposite L4 and 7 mm  opposite S1. There is extrinsic pressure on thecal sac and cauda equina  nerve roots with bundling/bunching of the nerve roots.     Also noted is similar T1 high density material/fluid located posteriorly  in the limited visualized thoracic spine, which measures 6 mm opposite  and posterior to the disc space T12-L1. Correlation with imaging of the  thoracic spine suggest this to be involving the thoracic thecal sac.  These represent an intrathecal/subdural hemorrhage/hematoma.     There is normal curvature and alignment.     The  vertebral body heights are normal. No fracture noted.     There is loss of height and signal of the intervertebral discs at L4-5  and L5-S1 representing degenerative disc disease.     There are type II discogenic degeneration of the endplates at L4-5.     The bone marrow signal of the vertebral bodies and the posterior  elements are normal. No focal edema or contusion.     L1-2: No disc bulging or herniation. There is moderate bilateral facet  arthropathy. Neural foramina or spinal canal are patent.     L2-3: Small posterior osteophytes. Mild diffuse disc bulging. There is  bilateral facet arthropathy and ligamentum hypertrophy. The neural  foramina or spinal canal are patent.     L3-4: Moderate diffuse disc bulging. Small posterior osteophytes. There  is bilateral facet arthropathy and ligamentum hypertrophy. There is  moderate spinal stenosis. Neural foramina are patent.     L4-5: Prominent posterior osteophytes. Mild disc bulging. Moderate is  severe facet arthropathy. Moderate ligamentous hypertrophy. There is  moderately severe spinal stenosis. Moderate narrowing of the neural  foramina bilaterally.     L5-S1: Mild diffuse disc bulging. There is bilateral facet arthropathy.  There is moderate left neural foraminal stenosis. Spinal canal is  patent.     The thoracic spine is separately reviewed and reported.     Limited visualized conus medullaris appear unremarkable. The status of  the thecal sac have been described above.       Impression:      1. Evidence of intrathecal/subdural hemorrhage/hematoma extending  throughout the lumbosacral spine with significant compression on the  cauda equina nerve roots as detailed above. This may be due to previous  trauma, iatrogenic or accidental.  2. Lumbar spondylosis.  3. No acute fracture, bone contusion or edema.  4. Multilevel prominent disc osteophyte complexes, facet arthropathy and  ligamentous hypertrophy and resultant neural foramina or spinal canal  stenosis.      The above report was communicated with the ER physician.        This report was finalized on 08/24/2023 14:48 by Dr. Yumi Overton MD.    MRI Thoracic Spine Without Contrast [[id number]] Collected: 08/24/23 1425     Updated: 08/24/23 1449    Narrative:      MRI THORACIC SPINE WO CONTRAST- 8/24/2023 12:41 PM CDT     HISTORY: severe back pain numbness fall     COMPARISON : NONE      TECHNIQUE: Multiplanar, multisequence MRI of the thoracic spine was  obtained without the use of contrast.      FINDINGS:      The spine is imaged from C7 through L1. Thoracic kyphosis is preserved.  No acute fracture identified. Vertebral body heights are  well-maintained. Underlying apex left thoracic curvature. Multilevel  loss of disc height. No discrete disc herniation identified. There is  intradural blood product identified at several levels, notably the T5-T6  vertebral levels (series 601-image 13, series 701-image 14) and again at  and below the T10 vertebral level (series 601-image 10, series 701-image  10). This images as early to late subacute blood product based on the  signal characteristics. The intrinsically T1 hyperintense blood products  at T11-T12 with the most notable area. Notable mass effect on the left  dorsal lateral cord at the T11 level (series 1201-image 15), displacing  the cord anteriorly. No compressive myelopathy identified. Posterior  elements are intact. No ligamentous injury identified. Paraspinal soft  tissues are unremarkable.       Impression:      1. Multiple segments of early-to-late subacute intradural blood product  identified in the thoracic and lumbar spine, seen as high as the T5  vertebral level and as low as the S1 level. Most of this blood product  appears to be in the subdural space, although it is possible that there  is both subarachnoid space and subdural blood product. There is a  notable compression on the left dorsal lateral cord at the T11 vertebral  level without obvious  compressive myelopathy.  2. No visualized acute fracture, ligamentous injury or subluxation.     Findings were discussed with LINA CUNNINGHAM on 8/24/2023 at 2:46 PM CDT.        This report was finalized on 08/24/2023 14:46 by Dr Brett Reyes, .          MRI brain:  MRI spine:           CT Head:  CT c-spine:  CT t-spine:  CT l-spine:  X-ray:    I reviewed the patient's new clinical results.  Lab Results (last 24 hours)       Procedure Component Value Units Date/Time    Comprehensive Metabolic Panel [[id number]]  (Abnormal) Collected: 08/24/23 1140    Specimen: Blood Updated: 08/24/23 1212     Glucose 110 mg/dL      BUN 22 mg/dL      Creatinine 1.31 mg/dL      Sodium 130 mmol/L      Potassium 3.5 mmol/L      Chloride 93 mmol/L      CO2 24.0 mmol/L      Calcium 9.7 mg/dL      Total Protein 6.9 g/dL      Albumin 3.7 g/dL      ALT (SGPT) 47 U/L      AST (SGOT) 23 U/L      Alkaline Phosphatase 75 U/L      Total Bilirubin 0.8 mg/dL      Globulin 3.2 gm/dL      A/G Ratio 1.2 g/dL      BUN/Creatinine Ratio 16.8     Anion Gap 13.0 mmol/L      eGFR 47.6 mL/min/1.73     Narrative:      GFR Normal >60  Chronic Kidney Disease <60  Kidney Failure <15      CBC & Differential [[id number]]  (Abnormal) Collected: 08/24/23 1140    Specimen: Blood Updated: 08/24/23 1150    Narrative:      The following orders were created for panel order CBC & Differential.  Procedure                               Abnormality         Status                     ---------                               -----------         ------                     CBC Auto Differential[[id number]]        Abnormal            Final result                 Please view results for these tests on the individual orders.    CBC Auto Differential [[id number]]  (Abnormal) Collected: 08/24/23 1140    Specimen: Blood Updated: 08/24/23 1150     WBC 12.17 10*3/mm3      RBC 4.82 10*6/mm3      Hemoglobin 14.2 g/dL      Hematocrit 42.0 %      MCV 87.1 fL      MCH 29.5 pg      MCHC 33.8 g/dL       RDW 14.0 %      RDW-SD 42.6 fl      MPV 10.0 fL      Platelets 299 10*3/mm3      Neutrophil % 70.2 %      Lymphocyte % 21.0 %      Monocyte % 5.9 %      Eosinophil % 1.6 %      Basophil % 0.6 %      Immature Grans % 0.7 %      Neutrophils, Absolute 8.54 10*3/mm3      Lymphocytes, Absolute 2.56 10*3/mm3      Monocytes, Absolute 0.72 10*3/mm3      Eosinophils, Absolute 0.20 10*3/mm3      Basophils, Absolute 0.07 10*3/mm3      Immature Grans, Absolute 0.08 10*3/mm3      nRBC 0.0 /100 WBC             Osmar Rosales, APRN

## 2023-08-31 ENCOUNTER — READMISSION MANAGEMENT (OUTPATIENT)
Dept: CALL CENTER | Facility: HOSPITAL | Age: 58
End: 2023-08-31
Payer: COMMERCIAL

## 2023-08-31 NOTE — OUTREACH NOTE
Medical Week 2 Survey      Flowsheet Row Responses   [facility] patient discharged from? [facility]   Does the patient have one of the following disease processes/diagnoses(primary or secondary)? Other   Week 2 attempt successful? Yes   Call start time 1141   Discharge diagnosis back pain, vomitingHypertensive urgency   Revoke Readmitted  [Readmitted at [facility] in [place]]   Call end time 1145   Meds reviewed with patient/caregiver? Yes   Does the patient have all medications ordered at discharge? No   Nursing Interventions No intervention needed   Prescription comments Patient currently admitted at hospital in [facility]   Is the patient taking all medications as directed (includes completed medication regime)? No   Does the patient have a primary care provider?  Yes   Does the patient have an appointment with their PCP within 7 days of discharge? No   What is preventing the patient from scheduling follow up appointments within 7 days of discharge? Haven't had time   Has the patient kept scheduled appointments due by today? N/A   Week 2 Call Completed? Yes   Call end time 1145            Berna FLOOD - Registered Nurse

## 2023-09-01 ENCOUNTER — HOSPITAL ENCOUNTER (INPATIENT)
Age: 58
LOS: 5 days | Discharge: HOME | DRG: 074 | End: 2023-09-06
Attending: PSYCHIATRY & NEUROLOGY | Admitting: PSYCHIATRY & NEUROLOGY
Payer: MEDICAID

## 2023-09-01 DIAGNOSIS — G83.4 CAUDA EQUINA COMPRESSION (HCC): Primary | ICD-10-CM

## 2023-09-01 PROCEDURE — 1180000000 HC REHAB R&B

## 2023-09-01 PROCEDURE — 94150 VITAL CAPACITY TEST: CPT

## 2023-09-01 PROCEDURE — 94760 N-INVAS EAR/PLS OXIMETRY 1: CPT

## 2023-09-01 PROCEDURE — 6370000000 HC RX 637 (ALT 250 FOR IP): Performed by: PSYCHIATRY & NEUROLOGY

## 2023-09-01 RX ORDER — OXYCODONE HYDROCHLORIDE AND ACETAMINOPHEN 5; 325 MG/1; MG/1
1 TABLET ORAL EVERY 6 HOURS PRN
Status: DISCONTINUED | OUTPATIENT
Start: 2023-09-01 | End: 2023-09-06 | Stop reason: HOSPADM

## 2023-09-01 RX ORDER — SENNA AND DOCUSATE SODIUM 50; 8.6 MG/1; MG/1
1 TABLET, FILM COATED ORAL 2 TIMES DAILY
Status: DISCONTINUED | OUTPATIENT
Start: 2023-09-01 | End: 2023-09-06 | Stop reason: HOSPADM

## 2023-09-01 RX ORDER — SENNA AND DOCUSATE SODIUM 50; 8.6 MG/1; MG/1
1 TABLET, FILM COATED ORAL 2 TIMES DAILY
Status: ON HOLD | COMMUNITY
End: 2023-09-06 | Stop reason: SDUPTHER

## 2023-09-01 RX ORDER — BISACODYL 10 MG
10 SUPPOSITORY, RECTAL RECTAL DAILY PRN
Status: DISCONTINUED | OUTPATIENT
Start: 2023-09-01 | End: 2023-09-06 | Stop reason: HOSPADM

## 2023-09-01 RX ORDER — ERGOCALCIFEROL 1.25 MG/1
50000 CAPSULE ORAL WEEKLY
COMMUNITY

## 2023-09-01 RX ORDER — POLYETHYLENE GLYCOL 3350 17 G/17G
17 POWDER, FOR SOLUTION ORAL DAILY PRN
Status: DISCONTINUED | OUTPATIENT
Start: 2023-09-01 | End: 2023-09-06 | Stop reason: HOSPADM

## 2023-09-01 RX ORDER — VITAMIN C
1 TAB ORAL DAILY
Status: DISCONTINUED | OUTPATIENT
Start: 2023-09-01 | End: 2023-09-06 | Stop reason: HOSPADM

## 2023-09-01 RX ORDER — ACETAMINOPHEN 325 MG/1
650 TABLET ORAL EVERY 4 HOURS PRN
Status: DISCONTINUED | OUTPATIENT
Start: 2023-09-01 | End: 2023-09-06 | Stop reason: HOSPADM

## 2023-09-01 RX ORDER — SENNA AND DOCUSATE SODIUM 50; 8.6 MG/1; MG/1
1 TABLET, FILM COATED ORAL 2 TIMES DAILY
Status: DISCONTINUED | OUTPATIENT
Start: 2023-09-01 | End: 2023-09-01

## 2023-09-01 RX ORDER — OXYCODONE HYDROCHLORIDE AND ACETAMINOPHEN 5; 325 MG/1; MG/1
1 TABLET ORAL EVERY 6 HOURS PRN
Status: ON HOLD | COMMUNITY
End: 2023-09-06 | Stop reason: HOSPADM

## 2023-09-01 RX ORDER — NICOTINE 21 MG/24HR
1 PATCH, TRANSDERMAL 24 HOURS TRANSDERMAL EVERY 24 HOURS
Status: DISCONTINUED | OUTPATIENT
Start: 2023-09-01 | End: 2023-09-02

## 2023-09-01 RX ORDER — NICOTINE 21 MG/24HR
1 PATCH, TRANSDERMAL 24 HOURS TRANSDERMAL EVERY 24 HOURS
Status: ON HOLD | COMMUNITY
End: 2023-09-06 | Stop reason: HOSPADM

## 2023-09-01 RX ORDER — VITAMIN B COMPLEX
1 CAPSULE ORAL DAILY
COMMUNITY

## 2023-09-01 RX ORDER — ALBUTEROL SULFATE 90 UG/1
2 AEROSOL, METERED RESPIRATORY (INHALATION) EVERY 6 HOURS PRN
Status: DISCONTINUED | OUTPATIENT
Start: 2023-09-01 | End: 2023-09-06 | Stop reason: HOSPADM

## 2023-09-01 RX ORDER — ERGOCALCIFEROL 1.25 MG/1
50000 CAPSULE ORAL WEEKLY
Status: DISCONTINUED | OUTPATIENT
Start: 2023-09-01 | End: 2023-09-06 | Stop reason: HOSPADM

## 2023-09-01 RX ORDER — CHOLECALCIFEROL (VITAMIN D3) 125 MCG
1000 CAPSULE ORAL DAILY
Status: DISCONTINUED | OUTPATIENT
Start: 2023-09-01 | End: 2023-09-06 | Stop reason: HOSPADM

## 2023-09-01 RX ORDER — POLYETHYLENE GLYCOL 3350 17 G/17G
17 POWDER, FOR SOLUTION ORAL DAILY PRN
Status: ON HOLD | COMMUNITY
End: 2023-09-06 | Stop reason: HOSPADM

## 2023-09-01 RX ADMIN — Medication 5000 UNITS: at 21:15

## 2023-09-01 RX ADMIN — ERGOCALCIFEROL 50000 UNITS: 1.25 CAPSULE ORAL at 21:20

## 2023-09-01 RX ADMIN — Medication 1 TABLET: at 21:15

## 2023-09-01 RX ADMIN — CYANOCOBALAMIN TAB 500 MCG 1000 MCG: 500 TAB at 21:15

## 2023-09-01 RX ADMIN — SENNOSIDES AND DOCUSATE SODIUM 1 TABLET: 50; 8.6 TABLET ORAL at 21:15

## 2023-09-01 RX ADMIN — OXYCODONE HYDROCHLORIDE AND ACETAMINOPHEN 1 TABLET: 5; 325 TABLET ORAL at 21:15

## 2023-09-01 ASSESSMENT — PAIN DESCRIPTION - LOCATION: LOCATION: BACK

## 2023-09-01 ASSESSMENT — PAIN - FUNCTIONAL ASSESSMENT: PAIN_FUNCTIONAL_ASSESSMENT: ACTIVITIES ARE NOT PREVENTED

## 2023-09-01 ASSESSMENT — PAIN DESCRIPTION - ORIENTATION: ORIENTATION: LOWER

## 2023-09-01 ASSESSMENT — PAIN SCALES - GENERAL
PAINLEVEL_OUTOF10: 6
PAINLEVEL_OUTOF10: 2

## 2023-09-01 ASSESSMENT — PAIN DESCRIPTION - DESCRIPTORS: DESCRIPTORS: DULL

## 2023-09-02 LAB
BASOPHILS # BLD: 0.1 K/UL (ref 0–0.2)
BASOPHILS NFR BLD: 1 % (ref 0–1)
EOSINOPHIL # BLD: 0.2 K/UL (ref 0–0.6)
EOSINOPHIL NFR BLD: 2.3 % (ref 0–5)
ERYTHROCYTE [DISTWIDTH] IN BLOOD BY AUTOMATED COUNT: 13.6 % (ref 11.5–14.5)
HCT VFR BLD AUTO: 42.4 % (ref 37–47)
HGB BLD-MCNC: 14.3 G/DL (ref 12–16)
IMM GRANULOCYTES # BLD: 0.2 K/UL
LYMPHOCYTES # BLD: 3.3 K/UL (ref 1.1–4.5)
LYMPHOCYTES NFR BLD: 32.8 % (ref 20–40)
MCH RBC QN AUTO: 30.7 PG (ref 27–31)
MCHC RBC AUTO-ENTMCNC: 33.7 G/DL (ref 33–37)
MCV RBC AUTO: 91 FL (ref 81–99)
MONOCYTES # BLD: 0.7 K/UL (ref 0–0.9)
MONOCYTES NFR BLD: 6.8 % (ref 0–10)
NEUTROPHILS # BLD: 5.4 K/UL (ref 1.5–7.5)
NEUTS SEG NFR BLD: 54.8 % (ref 50–65)
PLATELET # BLD AUTO: 278 K/UL (ref 130–400)
PMV BLD AUTO: 10.7 FL (ref 9.4–12.3)
PREALB SERPL-MCNC: 21 MG/DL (ref 20–40)
RBC # BLD AUTO: 4.66 M/UL (ref 4.2–5.4)
WBC # BLD AUTO: 9.9 K/UL (ref 4.8–10.8)

## 2023-09-02 PROCEDURE — 36415 COLL VENOUS BLD VENIPUNCTURE: CPT

## 2023-09-02 PROCEDURE — 6370000000 HC RX 637 (ALT 250 FOR IP): Performed by: PSYCHIATRY & NEUROLOGY

## 2023-09-02 PROCEDURE — 84134 ASSAY OF PREALBUMIN: CPT

## 2023-09-02 PROCEDURE — 97116 GAIT TRAINING THERAPY: CPT

## 2023-09-02 PROCEDURE — 1180000000 HC REHAB R&B

## 2023-09-02 PROCEDURE — 85025 COMPLETE CBC W/AUTO DIFF WBC: CPT

## 2023-09-02 PROCEDURE — 99223 1ST HOSP IP/OBS HIGH 75: CPT | Performed by: PSYCHIATRY & NEUROLOGY

## 2023-09-02 PROCEDURE — 97110 THERAPEUTIC EXERCISES: CPT

## 2023-09-02 PROCEDURE — 97535 SELF CARE MNGMENT TRAINING: CPT

## 2023-09-02 PROCEDURE — 97161 PT EVAL LOW COMPLEX 20 MIN: CPT

## 2023-09-02 PROCEDURE — 97165 OT EVAL LOW COMPLEX 30 MIN: CPT

## 2023-09-02 RX ORDER — NICOTINE 21 MG/24HR
1 PATCH, TRANSDERMAL 24 HOURS TRANSDERMAL DAILY
Status: DISCONTINUED | OUTPATIENT
Start: 2023-09-02 | End: 2023-09-06 | Stop reason: HOSPADM

## 2023-09-02 RX ADMIN — MAGNESIUM HYDROXIDE 30 ML: 400 SUSPENSION ORAL at 19:12

## 2023-09-02 RX ADMIN — Medication 5000 UNITS: at 09:19

## 2023-09-02 RX ADMIN — CYANOCOBALAMIN TAB 500 MCG 1000 MCG: 500 TAB at 09:19

## 2023-09-02 RX ADMIN — SENNOSIDES AND DOCUSATE SODIUM 1 TABLET: 50; 8.6 TABLET ORAL at 23:05

## 2023-09-02 RX ADMIN — SENNOSIDES AND DOCUSATE SODIUM 1 TABLET: 50; 8.6 TABLET ORAL at 09:19

## 2023-09-02 RX ADMIN — OXYCODONE HYDROCHLORIDE AND ACETAMINOPHEN 1 TABLET: 5; 325 TABLET ORAL at 10:48

## 2023-09-02 RX ADMIN — Medication 1 TABLET: at 09:19

## 2023-09-02 RX ADMIN — OXYCODONE HYDROCHLORIDE AND ACETAMINOPHEN 1 TABLET: 5; 325 TABLET ORAL at 19:12

## 2023-09-02 ASSESSMENT — PAIN DESCRIPTION - LOCATION
LOCATION: BACK
LOCATION: BACK;HIP

## 2023-09-02 ASSESSMENT — PAIN SCALES - GENERAL
PAINLEVEL_OUTOF10: 5
PAINLEVEL_OUTOF10: 8
PAINLEVEL_OUTOF10: 8

## 2023-09-02 ASSESSMENT — PAIN DESCRIPTION - DESCRIPTORS
DESCRIPTORS: ACHING;SHARP
DESCRIPTORS: ACHING;DISCOMFORT

## 2023-09-02 ASSESSMENT — PAIN DESCRIPTION - ORIENTATION
ORIENTATION: LOWER
ORIENTATION: LOWER;RIGHT

## 2023-09-02 ASSESSMENT — PAIN - FUNCTIONAL ASSESSMENT: PAIN_FUNCTIONAL_ASSESSMENT: ACTIVITIES ARE NOT PREVENTED

## 2023-09-02 NOTE — H&P
[facility]   History and Physical        Patient:   Rodrigo Crook  MR#:    [id number]  Account Number:                   [id number]      Room:    27/827-02   YOB: 1965  Date of Progress Note: 9/2/2023  Time of Note                           7:39 AM  Attending Physician:  Smooth Castrejon MD        Admitting diagnosis: Cauda equina syndrome, spinal cord injury    Secondary diagnoses: Hypertension, smoker, COPD    CHIEF COMPLAINT: Back pain and leg weakness      HISTORY OF PRESENT ILLNESS:   This 63 y.o. 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 She presented to [facility] ER on 8/24/23 with a new problem of progressively worsening lumbar back and right leg pain and recurrent falls. Physical exam findings of increased lower extremity tone, lower extremity hyperreflexia, and right Babinski. Imaging showed intradural blood products from T5-S1; no significant central canal stenosis throughout the thoracic or lumbar spine, and bladder distention. Santos catheter was placed for urinary retention. Patient also reports having very watery stools and not being able to control her bowels. Neurosurgery was consulted. He was seen by Dr. Ann Rodriguez who felt diffiretial diagnosis of spinal dural AVF or Spinal AVM. Due to her potential need for endovascular treatmetn, he recommended a tertiary center. Patient was accepted for transfer to [facility] for cauda equina work-up and concern for thrombus around her aorta. CTA of abdomen done shoted no evidence of arterial stenosis or acute arterial occlusion. Spinal angiogram done showed no acute abnormality. Patient not felt to need any surgical intervention. Santos catheter has been discontinued and patient is urinating on her own.  Patient

## 2023-09-02 NOTE — PLAN OF CARE
[facility] TREATMENT PLAN      [facility]    : 1965  Acct #: [de-identified]  MRN: [id number]   PHYSICIAN:  Eva Iverson MD  Primary Problem    Patient Active Problem List   Diagnosis    Cauda equina compression [facility])       Rehabilitation Diagnosis:     Cauda equina compression ([address]) [G83.4]       ADMIT DATE:2023   CARE PLAN     NURSING:  Kathryn Alamo while on this unit will:     [x] Be continent of bowel and bladder      [x] Have an adequate number of bowel movements   [] Urinate with no urinary retention >300ml in bladder   [] Complete bladder protocol with salomon removal   [] Maintain O2 SATs at ___%   [] Have pain managed while on ARU        [] Be pain free by discharge    [x] Have no skin breakdown while on ARU   [] Have improved skin integrity via wound measurements   [] Have no signs/symptoms of infection at the wound site  [x] Be free from injury during hospitalization   [x] Complete education with patient/family with understanding demonstrated for:  [x] Adjustment   [x] Other:   Nursing interventions may include bowel/bladder training, education for medical assistive devices, medication education, O2 saturation management, energy conservation, stress management techniques, fall prevention, alarms protocol, seating and positioning, skin/wound care, pressure relief instruction,dressing changes,  infection protection, DVT prophylaxis, and/or assistance with in room safety with transfers to bed, toilet, wheelchair, shower as well as bathroom activities and hygiene.      Patient/caregiver education for:   [] Disease/sustained injury/management      [] Medication Use   [] Surgical intervention   [] Safety   [] Body mechanics and or joint protection   [] Health maintenance       IRF-KAITLIN  Bladder and Bowel Continence  Bladder Continence: Always continent  Bowel Continence: Always continent       PHYSICAL THERAPY:  Goals:                  Short Term Goals  Time Frame for Short Term Goals: 1

## 2023-09-02 NOTE — PLAN OF CARE
Problem: Discharge Planning  Goal: Discharge to home or other facility with appropriate resources  Outcome: Progressing  Flowsheets (Taken 9/1/2023 2031)  Discharge to home or other facility with appropriate resources: Identify barriers to discharge with patient and caregiver     Problem: Safety - Adult  Goal: Free from fall injury  Outcome: Progressing     Problem: Pain  Goal: Verbalizes/displays adequate comfort level or baseline comfort level  Outcome: Progressing

## 2023-09-03 PROCEDURE — 1180000000 HC REHAB R&B

## 2023-09-03 PROCEDURE — 6370000000 HC RX 637 (ALT 250 FOR IP): Performed by: PSYCHIATRY & NEUROLOGY

## 2023-09-03 PROCEDURE — 94760 N-INVAS EAR/PLS OXIMETRY 1: CPT

## 2023-09-03 RX ADMIN — BISACODYL 10 MG: 10 SUPPOSITORY RECTAL at 00:30

## 2023-09-03 RX ADMIN — Medication 1 TABLET: at 08:49

## 2023-09-03 RX ADMIN — ACETAMINOPHEN 650 MG: 325 TABLET ORAL at 10:27

## 2023-09-03 RX ADMIN — CYANOCOBALAMIN TAB 500 MCG 1000 MCG: 500 TAB at 08:50

## 2023-09-03 RX ADMIN — Medication 5000 UNITS: at 08:49

## 2023-09-03 RX ADMIN — POLYETHYLENE GLYCOL 3350 17 G: 17 POWDER, FOR SOLUTION ORAL at 04:51

## 2023-09-03 ASSESSMENT — PAIN SCALES - GENERAL
PAINLEVEL_OUTOF10: 5
PAINLEVEL_OUTOF10: 2

## 2023-09-03 ASSESSMENT — PAIN DESCRIPTION - DESCRIPTORS: DESCRIPTORS: ACHING

## 2023-09-03 ASSESSMENT — PAIN - FUNCTIONAL ASSESSMENT: PAIN_FUNCTIONAL_ASSESSMENT: ACTIVITIES ARE NOT PREVENTED

## 2023-09-03 ASSESSMENT — PAIN DESCRIPTION - ORIENTATION: ORIENTATION: LOWER

## 2023-09-03 ASSESSMENT — PAIN DESCRIPTION - LOCATION: LOCATION: BACK

## 2023-09-03 NOTE — PLAN OF CARE
Problem: Discharge Planning  Goal: Discharge to home or other facility with appropriate resources  9/3/2023 0210 by Mikey Bravo RN  Outcome: Progressing  Flowsheets (Taken 9/2/2023 1915)  Discharge to home or other facility with appropriate resources: Identify barriers to discharge with patient and caregiver  9/2/2023 1529 by Ida Merlos RN  Outcome: Progressing     Problem: Safety - Adult  Goal: Free from fall injury  9/3/2023 0210 by Mikey Bravo RN  Outcome: Progressing  9/2/2023 1529 by Ida Merlos RN  Outcome: Progressing     Problem: Pain  Goal: Verbalizes/displays adequate comfort level or baseline comfort level  9/3/2023 0210 by Mikey Bravo RN  Outcome: Progressing  9/2/2023 1529 by Ida Merlos RN  Outcome: Progressing

## 2023-09-04 LAB
ANION GAP SERPL CALCULATED.3IONS-SCNC: 11 MMOL/L (ref 7–19)
BASOPHILS # BLD: 0.1 K/UL (ref 0–0.2)
BASOPHILS NFR BLD: 1.4 % (ref 0–1)
BUN SERPL-MCNC: 13 MG/DL (ref 6–20)
CALCIUM SERPL-MCNC: 9.5 MG/DL (ref 8.6–10)
CHLORIDE SERPL-SCNC: 103 MMOL/L (ref 98–111)
CO2 SERPL-SCNC: 22 MMOL/L (ref 22–29)
CREAT SERPL-MCNC: 0.7 MG/DL (ref 0.5–0.9)
EOSINOPHIL # BLD: 0.2 K/UL (ref 0–0.6)
EOSINOPHIL NFR BLD: 1.9 % (ref 0–5)
ERYTHROCYTE [DISTWIDTH] IN BLOOD BY AUTOMATED COUNT: 13.6 % (ref 11.5–14.5)
GLUCOSE SERPL-MCNC: 103 MG/DL (ref 74–109)
HCT VFR BLD AUTO: 42.1 % (ref 37–47)
HGB BLD-MCNC: 14.1 G/DL (ref 12–16)
IMM GRANULOCYTES # BLD: 0.1 K/UL
LYMPHOCYTES # BLD: 3 K/UL (ref 1.1–4.5)
LYMPHOCYTES NFR BLD: 29.5 % (ref 20–40)
MCH RBC QN AUTO: 30.6 PG (ref 27–31)
MCHC RBC AUTO-ENTMCNC: 33.5 G/DL (ref 33–37)
MCV RBC AUTO: 91.3 FL (ref 81–99)
MONOCYTES # BLD: 0.9 K/UL (ref 0–0.9)
MONOCYTES NFR BLD: 8.5 % (ref 0–10)
NEUTROPHILS # BLD: 5.9 K/UL (ref 1.5–7.5)
NEUTS SEG NFR BLD: 57.5 % (ref 50–65)
PLATELET # BLD AUTO: 257 K/UL (ref 130–400)
PMV BLD AUTO: 10.2 FL (ref 9.4–12.3)
POTASSIUM SERPL-SCNC: 4.1 MMOL/L (ref 3.5–5)
RBC # BLD AUTO: 4.61 M/UL (ref 4.2–5.4)
SODIUM SERPL-SCNC: 136 MMOL/L (ref 136–145)
WBC # BLD AUTO: 10.3 K/UL (ref 4.8–10.8)

## 2023-09-04 PROCEDURE — 36415 COLL VENOUS BLD VENIPUNCTURE: CPT

## 2023-09-04 PROCEDURE — 97116 GAIT TRAINING THERAPY: CPT

## 2023-09-04 PROCEDURE — 6370000000 HC RX 637 (ALT 250 FOR IP): Performed by: PSYCHIATRY & NEUROLOGY

## 2023-09-04 PROCEDURE — 94760 N-INVAS EAR/PLS OXIMETRY 1: CPT

## 2023-09-04 PROCEDURE — 1180000000 HC REHAB R&B

## 2023-09-04 PROCEDURE — 80048 BASIC METABOLIC PNL TOTAL CA: CPT

## 2023-09-04 PROCEDURE — 99232 SBSQ HOSP IP/OBS MODERATE 35: CPT | Performed by: PSYCHIATRY & NEUROLOGY

## 2023-09-04 PROCEDURE — 97530 THERAPEUTIC ACTIVITIES: CPT

## 2023-09-04 PROCEDURE — 85025 COMPLETE CBC W/AUTO DIFF WBC: CPT

## 2023-09-04 PROCEDURE — 97535 SELF CARE MNGMENT TRAINING: CPT

## 2023-09-04 PROCEDURE — 97110 THERAPEUTIC EXERCISES: CPT

## 2023-09-04 RX ORDER — LACTULOSE 10 G/15ML
10 SOLUTION ORAL 3 TIMES DAILY
Status: DISCONTINUED | OUTPATIENT
Start: 2023-09-04 | End: 2023-09-06 | Stop reason: HOSPADM

## 2023-09-04 RX ORDER — LOSARTAN POTASSIUM 50 MG/1
50 TABLET ORAL DAILY
Status: DISCONTINUED | OUTPATIENT
Start: 2023-09-04 | End: 2023-09-06 | Stop reason: HOSPADM

## 2023-09-04 RX ADMIN — OXYCODONE HYDROCHLORIDE AND ACETAMINOPHEN 1 TABLET: 5; 325 TABLET ORAL at 05:08

## 2023-09-04 RX ADMIN — Medication 1 TABLET: at 08:07

## 2023-09-04 RX ADMIN — LOSARTAN POTASSIUM 50 MG: 50 TABLET, FILM COATED ORAL at 10:04

## 2023-09-04 RX ADMIN — Medication 5000 UNITS: at 08:07

## 2023-09-04 RX ADMIN — OXYCODONE HYDROCHLORIDE AND ACETAMINOPHEN 1 TABLET: 5; 325 TABLET ORAL at 20:17

## 2023-09-04 RX ADMIN — CYANOCOBALAMIN TAB 500 MCG 1000 MCG: 500 TAB at 08:07

## 2023-09-04 RX ADMIN — SENNOSIDES AND DOCUSATE SODIUM 1 TABLET: 50; 8.6 TABLET ORAL at 20:17

## 2023-09-04 RX ADMIN — SENNOSIDES AND DOCUSATE SODIUM 1 TABLET: 50; 8.6 TABLET ORAL at 08:07

## 2023-09-04 ASSESSMENT — PAIN DESCRIPTION - DESCRIPTORS
DESCRIPTORS: ACHING;THROBBING
DESCRIPTORS: ACHING

## 2023-09-04 ASSESSMENT — PAIN SCALES - GENERAL
PAINLEVEL_OUTOF10: 8
PAINLEVEL_OUTOF10: 4
PAINLEVEL_OUTOF10: 0

## 2023-09-04 ASSESSMENT — PAIN DESCRIPTION - LOCATION
LOCATION: BACK
LOCATION: BACK

## 2023-09-04 ASSESSMENT — PAIN DESCRIPTION - ORIENTATION
ORIENTATION: LOWER
ORIENTATION: LOWER

## 2023-09-04 ASSESSMENT — PAIN - FUNCTIONAL ASSESSMENT: PAIN_FUNCTIONAL_ASSESSMENT: ACTIVITIES ARE NOT PREVENTED

## 2023-09-04 NOTE — PATIENT CARE CONFERENCE
[facility] ACUTE INPATIENT REHABILITATION  TEAM CONFERENCE NOTE    Date: 2023  Patient Name: Madelaine Stewart        MRN: [id number]    : 1965  (63 y.o.)  Gender: female      Diagnosis: Cauda Equina Syndrome      PHYSICAL THERAPY  GROSS ASSESSMENT       BED MOBILITY  Bed mobility  Rolling to Left: Independent  Rolling to Right: Independent  Supine to Sit: Independent  Sit to Supine: Independent       TRANSFERS  Transfers  Sit to Stand: Independent  Stand to Sit: Modified independent  Bed to Chair: Modified independent  Car Transfer: Independent (pt able to step into car without difficulty)  WHEELCHAIR PROPULSION  Propulsion 1  Propulsion: Manual  Level: Level Tile  Method: ROCIO YUSUF  Level of Assistance: Independent  Description/ Details: PT TURNED R & L THROUGH MULTIPLE HALLWAYS, DEMONSTRATED SMOOTH TAKEOFF AND TURNING.  NO ISSUES NOTED  Distance: 200+ FT  AMBULATION  Ambulation  Surface: Level tile  Device: No Device  Other Apparatus: Wheelchair follow  Assistance: Independent, Supervision  Quality of Gait: NO ISSUES  Gait Deviations: None  Distance: 1000+ '  Comments: amb with frequent direction changes,speed changes,  turns, sidestepping, head turns, head nods and door management, all without difficulty or complaint  More Ambulation?: Yes  STAIRS  Stairs  # Steps : 35  Stairs Height: 6\" (both 4\" and 6\" performed)  Rails: Left ascending, Right ascending (INTERMITTENT USE OF RAIL)  Assistance: Modified independent   Comment: started with step to pattern but quickly transitioned to reciprocal pattern with good confidence and stability and little use of UE for wbing on HR  GOALS:  Short Term Goals  Time Frame for Short Term Goals: 1 week  Short Term Goal 1: Independent bed mobility  Short Term Goal 2: 150 ft ambulation with CGA  Short Term Goal 3: 150 ft wheelchair propulsion with supervision  Short Term Goal 4: Ascend/descend curb with CGA    Long Term Goals  Time Frame for Long Term Goals : 2 Weeks  Long

## 2023-09-04 NOTE — PLAN OF CARE
Problem: Discharge Planning  Goal: Discharge to home or other facility with appropriate resources  9/4/2023 0036 by Kevin Craven RN  Outcome: Progressing  9/3/2023 1511 by Chata Ya RN  Outcome: Progressing     Problem: Safety - Adult  Goal: Free from fall injury  9/4/2023 0036 by Kevin Craven RN  Outcome: Progressing  9/3/2023 1511 by Chata Ya RN  Outcome: Progressing     Problem: Pain  Goal: Verbalizes/displays adequate comfort level or baseline comfort level  9/4/2023 0036 by Kevin Craven RN  Outcome: Progressing  9/3/2023 1511 by Chata Ya RN  Outcome: Progressing

## 2023-09-05 PROCEDURE — 1180000000 HC REHAB R&B

## 2023-09-05 PROCEDURE — 97116 GAIT TRAINING THERAPY: CPT

## 2023-09-05 PROCEDURE — 99232 SBSQ HOSP IP/OBS MODERATE 35: CPT | Performed by: PSYCHIATRY & NEUROLOGY

## 2023-09-05 PROCEDURE — 97110 THERAPEUTIC EXERCISES: CPT

## 2023-09-05 PROCEDURE — 97530 THERAPEUTIC ACTIVITIES: CPT

## 2023-09-05 PROCEDURE — 6370000000 HC RX 637 (ALT 250 FOR IP): Performed by: PSYCHIATRY & NEUROLOGY

## 2023-09-05 RX ADMIN — LOSARTAN POTASSIUM 50 MG: 50 TABLET, FILM COATED ORAL at 07:50

## 2023-09-05 RX ADMIN — OXYCODONE HYDROCHLORIDE AND ACETAMINOPHEN 1 TABLET: 5; 325 TABLET ORAL at 10:48

## 2023-09-05 RX ADMIN — OXYCODONE HYDROCHLORIDE AND ACETAMINOPHEN 1 TABLET: 5; 325 TABLET ORAL at 20:19

## 2023-09-05 RX ADMIN — LACTULOSE 10 G: 20 SOLUTION ORAL at 14:05

## 2023-09-05 RX ADMIN — SENNOSIDES AND DOCUSATE SODIUM 1 TABLET: 50; 8.6 TABLET ORAL at 07:50

## 2023-09-05 RX ADMIN — Medication 5000 UNITS: at 07:50

## 2023-09-05 RX ADMIN — CYANOCOBALAMIN TAB 500 MCG 1000 MCG: 500 TAB at 07:50

## 2023-09-05 RX ADMIN — Medication 1 TABLET: at 07:50

## 2023-09-05 RX ADMIN — SENNOSIDES AND DOCUSATE SODIUM 1 TABLET: 50; 8.6 TABLET ORAL at 20:19

## 2023-09-05 ASSESSMENT — PAIN - FUNCTIONAL ASSESSMENT
PAIN_FUNCTIONAL_ASSESSMENT: ACTIVITIES ARE NOT PREVENTED
PAIN_FUNCTIONAL_ASSESSMENT: ACTIVITIES ARE NOT PREVENTED

## 2023-09-05 ASSESSMENT — PAIN DESCRIPTION - ORIENTATION
ORIENTATION: LOWER
ORIENTATION: LOWER

## 2023-09-05 ASSESSMENT — PAIN DESCRIPTION - LOCATION
LOCATION: BACK
LOCATION: BACK

## 2023-09-05 ASSESSMENT — PAIN DESCRIPTION - DESCRIPTORS
DESCRIPTORS: ACHING;SHARP
DESCRIPTORS: ACHING;DISCOMFORT

## 2023-09-05 ASSESSMENT — PAIN SCALES - GENERAL
PAINLEVEL_OUTOF10: 6
PAINLEVEL_OUTOF10: 2
PAINLEVEL_OUTOF10: 5
PAINLEVEL_OUTOF10: 5

## 2023-09-05 NOTE — PROGRESS NOTES
09/02/23 1300   Restrictions/Precautions   Restrictions/Precautions Fall Risk   General   Additional Pertinent Hx HTN, COPD, Tobacco abuse, chronic back pain   Diagnosis Cauda Equina Syndrome   Subjective   Subjective Pain is 6/10, she received pain medication earlier. Vitals   O2 Device None (Room air)   Ambulation   Surface Level tile   Device Rolling Walker   Assistance Contact guard assistance   Quality of Gait reciprocal pattern   Gait Deviations Slow Juan Manuel;Decreased step height   Distance 10' in room   More Ambulation? Yes   Ambulation 2   Surface - 2 level tile   Device 2 No device   Assistance 2 Contact guard assistance   Gait Deviations Slow Juan Manuel;Decreased step length;Decreased step height   Distance 25'   Comments No LOB, however slower juan manuel   Ambulation 3   Surface - 3 level tile   Device 3 Rolling Walker   Assistance 3 Contact guard assistance   Gait Deviations Slow Juan Manuel;Decreased step height   Distance 100'   Comments Improved confidence with AD   Propulsion 1   Propulsion Manual   Level Level Tile   Method RUE;LUE   Level of Assistance Stand by assistance   Distance 150'   PT Exercises   Exercise Treatment seated exercises (hip flexion, LAQ, hip abd/add)   Assessment   Assessment Tolerated treatment well this afternoon. Patient able to ambulate without AD, however she appeared more stable and comfortable with AD. Required intermittent rest breaks, BLE weakness with exercises and no resistance. Discharge Recommendations Continue to assess pending progress; Patient would benefit from continued therapy after discharge   [address] [place] [address]  minutes of therapy at least 5 out of 7 days a week   Current Treatment Recommendations Strengthening;ROM;Balance training;Functional mobility training;Transfer training; Endurance training; Wheelchair mobility training;Gait training;Stair training;Home exercise program;Safety education & training;Patient/Caregiver education &
09/04/23 1430   Bed mobility   Rolling to Left Independent   Rolling to Right Independent   Supine to Sit Independent   Sit to Supine Independent   Transfers   Stand to Sit Modified independent   Bed to Chair Modified independent   Ambulation   Device No Device   Assistance Stand by assistance;Modified Independent   Quality of Gait proper form, arm swing   Distance 300 ft   Ambulation 2   Device 2 Rollator   Assistance 2 Modified Independent   Quality of Gait 2 PROPER FORM   Distance 250 ft   Ambulation 3   Device 3 No device   Assistance 3 Independent   Quality of Gait 3 NO IMPAIRMENTS NOTED   Distance 300 FT   Stairs   # Steps  35   Rails Left ascending;Right ascending  (INTERMITTENT USE OF RAIL)   Assistance Modified independent    PT Exercises   Exercise Treatment STANDING BILAT HIP FLEX/ABD/EXT; CALF RAISES; MINI SQUATS X 10 EA   PROM Exercises SIDESTEPPING/BACKWARDS WALKING  12 FT EA DIRECTION   A/AROM Exercises TANDEM STANDING 3X30 SEC EA   Assessment   Assessment CLEARED FOR UP AD GRIS. AWAITING OT AND NSG SIGNATURES. WOULD BENEFIT FROM OUTDOOR AMBULATION/MOBLITY TOMORROW.
09/05/23 1100   Restrictions/Precautions   Restrictions/Precautions Up Ad Gris   General   Chart Reviewed Yes   Patient assessed for rehabilitation services? Yes   Additional Pertinent Hx HTN, COPD, Tobacco abuse, chronic back pain   Diagnosis Cauda Equina Syndrome   General Comment   Comments pt in recliner upon arrival, aggreable to therapy   Subjective   Subjective minor back pain from previous OT session (standing up from squat), pt reported taking a pain pill ~15 min ago. Pain mild; did not numerically rate   Ambulation   Surface Level tile   Device No Device   Assistance Independent;Supervision   Quality of Gait NO ISSUES   Gait Deviations None   Distance 1000+ '   Ambulation 2   Surface - 2 level tile   Device 2 Rollator   Assistance 2 Modified Independent   Quality of Gait 2 APPROPRIATE USE AND AMBULATION ABILITY; NO DEVIATIONS NOTED   Distance 1000~ FT   Comments AMBULATED WITH MULTIPLE L&R TURNS, NO BALANCE ISSUES NOTED   Propulsion 1   Propulsion Manual   Level Level Tile   Method RUE;LUE   Level of Assistance Independent   Description/ Details PT TURNED R & L THROUGH MULTIPLE HALLWAYS, DEMONSTRATED SMOOTH TAKEOFF AND TURNING. NO ISSUES NOTED   Distance 200+ FT   PT Exercises   PROM Exercises // BARS: SIDESTEPS, BACKWARD WALKING KNEE MARCHES ALL DOWN AND BACK LENGTH OF PARALLEL BARS X2   Static Standing Balance Exercises RET TBAND:MULTIFIUS WALKS X5 PER SIDE   Assessment   Assessment PT HAS SHOWN AN INCREASE IN STRENGTH AND USE OF ABDOMINAL MUSCLES WITH ACTIVITIES DURING THIS SESSION. PT DEMONSTRATES APPROPRIATE USE OF AD AND AMBULATION ABILITY WITHOUT USE OF AD.    Safety Devices   Type of Devices Left in chair  (PT UP AD GRIS)   PT Individual Minutes   Time In 1100   Time Out 1200   Minutes 60
4 Eyes Skin Assessment    Kathryn Rice Courser is being assessed upon: Admission    I agree that Alicia Ramos LPN, along with Mila  RN have performed a thorough Head to Toe Skin Assessment on the patient. ALL assessment sites listed below have been assessed. Areas assessed by both nurses:     [x]   Head, Face, and Ears   [x]   Shoulders, Back, and Chest  [x]   Arms, Elbows, and Hands   [x]   Coccyx, Sacrum, and Ischium  [x]   Legs, Feet, and Heels    Does the Patient have Skin Breakdown? No    If yes, description of skin breakdown:    Does the Patient have Surgical Incision(s)?  No    Bonilla Prevention initiated: No  Wound Care Orders initiated: No    WOC nurse consulted for Pressure Injury (Stage 3,4, Unstageable, DTI, NWPT, and Complex wounds) and New or Established Ostomies: No        Primary Nurse eSignature: Adelina Simons LPN on 6/2/1458 at 44:78 PM      Co-Signer eSignature: Electronically signed by Kaylyn Jones RN on 9/2/23 at 12:08 AM CDT
[facility]  Test for Patient [place] in the Areas of Transfers/Ambulation    Ambulation/Transfers   Independent ambulation in room with assistive device:  Yes     -Device Type:  ROLLATOR  Independent transfers from wheelchair to surface in room:  Yes     Bathroom Transfers  Independent transfers in patient bathroom:  Yes         Inpatient Rehabilitation Nursing and Therapies feel as though the patient qualifies for an acute rehabilitation test for independence in the areas of transfers and ambulation prior to discharging from Inpatient Rehabilitation Unit at [facility].  This test for independence in the areas of transfers and ambulation must be agreed upon by the patient's physician, nurse, and therapists.         Nurse Approval:  Electronically signed by Sindi Nassar RN on 9/4/23 at 3:08 PM CDT     Physical Therapist Approval:  Electronically signed by Gerardo Ariza PT on 9/4/23 at 2:48 PM CDT      Occupational Therapist Approval: Electronically signed by India De León OT on 9/4/2023 at 3:36 PM
Comprehensive Nutrition Assessment    Type and Reason for Visit:  Initial    Nutrition Recommendations/Plan:   Obtain weight. Follow for po intake     Malnutrition Assessment:  Malnutrition Status:  No malnutrition (09/02/23 1345)    Context:  Acute Illness     Findings of the 6 clinical characteristics of malnutrition:  Energy Intake:  Mild decrease in energy intake (Comment)  Weight Loss:  No significant weight loss     Body Fat Loss:  Unable to assess     Muscle Mass Loss:  Unable to assess    Fluid Accumulation:  No significant fluid accumulation     Strength:  Not Performed    Nutrition Assessment:    Following patient for new admission to Rehab unit. Pt with therapies at time of visit. PO intake improving since breakfast--brkfast 25-50%, lunch %. Weight since admission is not available. But using 8/24/2023 information, pt is 5'9\" and weighed 135#. Nutrition Related Findings:      Wound Type: None       Current Nutrition Intake & Therapies:    Average Meal Intake: 26-50%, %  Average Supplements Intake: None Ordered  ADULT DIET; Regular    Anthropometric Measures:  Height: 5' 9\" (175.3 cm)  Ideal Body Weight (IBW): 145 lbs (66 kg)    Admission Body Weight:  (NA)  Current Body Weight:  (NA),   IBW. Current BMI (kg/m2):    Usual Body Weight: 135 lb (61.2 kg) (8/24/2023   140#  1/2022)     Weight Adjustment For: No Adjustment  BMI Categories: Normal Weight (BMI 18.5-24. 9)    Nutrition Diagnosis:   No nutrition diagnosis at this time related to   as evidenced by      Nutrition Interventions:   Food and/or Nutrient Delivery: Continue Current Diet  Nutrition Education/Counseling: No recommendation at this time  Coordination of Nutrition Care: Continue to monitor while inpatient       Goals:     Goals: Meet at least 75% of estimated needs, PO intake 50% or greater       Nutrition Monitoring and Evaluation:   Behavioral-Environmental Outcomes: None Identified  Food/Nutrient Intake Outcomes:
Durable Medical Equipment   Physician Order     Patient Name Dulce Conteh  Patient [facility]:[phone] (Home    Patient Address: [address] [place] [address]    Patient Height Height: 5' 9\" (175.3 cm)  Patient Weight     1965           DME NEEDED:      **ROLLATOR    **BEDSIDE COMMODE              Coverage Information (for Hospital Account [de-identified])    F/O Payor/Plan Precert #   [facility] MEDICAID/[facility] MEDICAID    Subscriber Subscriber #   Dulce Conteh [id number]   Address Phone   [address]   [place], [address]  [address]            Medical Problems  Comment        Hospital Problem List  Never Reviewed     ICD-10-CM Priority Class Noted POA    Cauda equina compression ([address]) G83.4   2023 Yes         [facility]   History and Physical           Patient:                                    Dulce Conteh  MR#:                                        [id number]  Account Number:                   [id number]              Room:                                      [address] [place] [address]      YOB: 1965  Date of Progress Note:           2023  Time of Note                           7:39 AM  Attending Physician:               Kevin Harding MD           Admitting diagnosis: Cauda equina syndrome, spinal cord injury     Secondary diagnoses: Hypertension, smoker, COPD     CHIEF COMPLAINT: Back pain and leg weakness        HISTORY OF PRESENT ILLNESS:   This 63 y.o. 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 She presented to [facility] ER on 23 with a new problem of progressively worsening lumbar back and right leg pain and recurrent falls. Physical exam findings of increased lower extremity tone, lower extremity hyperreflexia, and right Babinski.
Facility/Department: [facility] 8 REHAB UNIT  Occupational Therapy Treatment Note    Name: Anamika Mckenzie  : 1965  MRN: [id number]  Date of Service: 2023    Discharge Recommendations:  Home with assist PRN, Continue to assess pending progress, Home with Home health OT          Patient Diagnosis(es): There were no encounter diagnoses. Past Medical History:  has a past medical history of Cauda equina compression (HCC) and COPD (chronic obstructive pulmonary disease) ([address]). Past Surgical History:  has a past surgical history that includes Hysterectomy and ovarian cyst removal.           Assessment   Performance deficits / Impairments: Decreased functional mobility ; Decreased balance;Decreased ADL status; Decreased endurance;Decreased high-level IADLs  Activity Tolerance  Activity Tolerance: Patient Tolerated treatment well            Plan   Occupational Therapy Plan  Current Treatment Recommendations: Balance training, Functional mobility training, Endurance training, Strengthening, Patient/Caregiver education & training, Equipment evaluation, education, & procurement, Self-Care / ADL, Home management training, Coordination training, Safety education & training     Restrictions  Restrictions/Precautions  Restrictions/Precautions: Fall Risk     23 0900   Pre Treatment Pain Screening   Pain at present 0   Scale Used Numeric Score       Objective   Functional Mobility  Functional - Mobility Device: Rolling Walker  Activity: Other, To/from bathroom  Assist Level: Supervision     23 0900   Safety Devices   Type of Devices Call light within reach; Left in bed       Transfers  Stand Step Transfers: Supervision  Sit to stand: Supervision  Stand to sit: Supervision        Exercise Treatment: 2# dowel--all planes, 1 set, 15reps        Balance  Sitting Balance: Independent  Standing Balance: Supervision    Therapeutic Recreation   Stress Management/Relaxation Training Utilizes stress management/relaxation techniques
Facility/Department: [facility] 8 REHAB UNIT  Occupational Therapy Treatment Note    Name: Jasmine Reyes  : 1965  MRN: [id number]  Date of Service: 2023    Discharge Recommendations:  Home with assist PRN, Continue to assess pending progress, Home with Home health OT  OT Equipment Recommendations  Equipment Needed: Yes  Other: BSC and rollator---dme script started to be signed. .will need to communicate with patient on where to order DME       Patient Diagnosis(es): There were no encounter diagnoses. Past Medical History:  has a past medical history of Cauda equina compression (HCC) and COPD (chronic obstructive pulmonary disease) ([address]). Past Surgical History:  has a past surgical history that includes Hysterectomy and ovarian cyst removal.           Assessment   Performance deficits / Impairments: Decreased functional mobility ; Decreased endurance;Decreased high-level IADLs  Activity Tolerance  Activity Tolerance: Patient Tolerated treatment well            Plan   Occupational Therapy Plan  Current Treatment Recommendations: Balance training, Functional mobility training, Endurance training, Strengthening, Home management training, Equipment evaluation, education, & procurement     Restrictions  Restrictions/Precautions  Restrictions/Precautions: Up Ad Whit    Subjective   General  Patient assessed for rehabilitation services?: Yes  Family / Caregiver Present: No  Diagnosis: cauda equina compression       Objective   Functional Mobility  Functional - Mobility Device: 4-Wheeled Walker  Activity: Retrieve items, Transport items, To/From therapy gym, To/from bathroom, Other  Assist Level: Modified independent     Safety Devices  Type of Devices: Left in bed;Call light within reach    Bed mobility  Supine to Sit: Independent  Sit to Supine: Independent  Transfers  Stand Step Transfers: Independent  Sit to stand: Independent  Stand to sit:  Independent       Toileting Hygiene  Assistance needed: Independent  Comment:
Facility/Department: [facility] 8 REHAB UNIT  Occupational Therapy Treatment Note    Name: Magdaleno Conte  : 1965  MRN: [id number]  Date of Service: 2023    Discharge Recommendations:  Home with assist PRN, Continue to assess pending progress, Home with Home health OT          Patient Diagnosis(es): There were no encounter diagnoses. Past Medical History:  has a past medical history of Cauda equina compression (HCC) and COPD (chronic obstructive pulmonary disease) ([address] Past Surgical History:  has a past surgical history that includes Hysterectomy and ovarian cyst removal.           Assessment   Performance deficits / Impairments: Decreased functional mobility ; Decreased balance;Decreased ADL status; Decreased endurance;Decreased high-level IADLs  Activity Tolerance  Activity Tolerance: Patient Tolerated treatment well            Plan   Occupational Therapy Plan  Current Treatment Recommendations: Balance training, Functional mobility training, Endurance training, Strengthening, Patient/Caregiver education & training, Equipment evaluation, education, & procurement, Self-Care / ADL, Home management training, Coordination training, Safety education & training     Restrictions  Restrictions/Precautions  Restrictions/Precautions: Fall Risk    Subjective   General  Patient assessed for rehabilitation services?: Yes  Family / Caregiver Present: Yes (family)  Diagnosis: cauda equina compression     Objective   Functional Mobility  Functional - Mobility Device: 4-Wheeled Walker  Activity: Retrieve items, Transport items, To/from bathroom  Assist Level: Supervision    Bed mobility  Supine to Sit: Independent  Sit to Supine: Independent  Transfers  Stand Step Transfers: Supervision  Sit to stand: Supervision  Stand to sit: Supervision        [address] needed: Supervision or touching assistance  Comment: SBA  CARE Score: 4  Discharge Goal: Independent      Toilet Transfer  Assistance needed: Supervision or
Kathryn Alamo arrived to room # 917 [id number]. Presented with: Cauda Equina Syndrome  Mental Status: Patient is oriented, alert, logical, thought processes intact, and able to concentrate and follow conversation. Vitals:    09/01/23 2046   BP:    Pulse: 92   Resp:    Temp:    SpO2: 98%     Patient safety contract and falls prevention contract reviewed with patient Yes. Oriented Patient to room. Call light within reach. Yes.   Needs, issues or concerns expressed at this time: no.      Electronically signed by Gold Lilly LPN on 3/5/1235 at 39:23 PM
Nutrition Assessment     Type and Reason for Visit: Reassess    Nutrition Recommendations/Plan:   Continue POC. Malnutrition Assessment:  Malnutrition Status: No malnutrition    Nutrition Assessment:  Pt reports fair appetite. PO intake avg >50% of most meals. She states she is eating a similar amount here as she does at home. Current wt is consistent with her reported usual wt. She voices no preferences/aversions. Encouraged intake. Will continue to follow. Nutrition Related Findings:     Wound Type: None    Current Nutrition Therapies:    ADULT DIET;  Regular    Anthropometric Measures:  Height: 5' 9\" (175.3 cm)  Current Body Wt: 134 lb 14.4 oz (61.2 kg)   BMI: 19.9    Nutrition Diagnosis:   No nutrition diagnosis at this time     Nutrition Interventions:   Food and/or Nutrient Delivery: Continue Current Diet  Nutrition Education/Counseling: No recommendation at this time  Coordination of Nutrition Care: Continue to monitor while inpatient       Goals:  Previous Goal Met: Progressing toward Goal(s)  Goals: Meet at least 75% of estimated needs, PO intake 50% or greater       Nutrition Monitoring and Evaluation:   Behavioral-Environmental Outcomes: None Identified  Food/Nutrient Intake Outcomes: Food and Nutrient Intake  Physical Signs/Symptoms Outcomes: Biochemical Data, Fluid Status or Edema, Weight, Skin    Discharge Planning:    Continue current diet     Neyda Doherty, MS, RD, LD, [facility]  Contact: [phone]
Occupational Therapy  Facility/Department: [facility] 8 REHAB UNIT  Occupational Therapy Initial Assessment    Name: Dulce Conteh  : 1965  MRN: [id number]  Date of Service: 2023    Discharge Recommendations:  Home with assist PRN, Continue to assess pending progress, Home with Home health OT          Patient Diagnosis(es): There were no encounter diagnoses. Past Medical History:  has a past medical history of Cauda equina compression (HCC) and COPD (chronic obstructive pulmonary disease) ([address] Past Surgical History:  has a past surgical history that includes Hysterectomy and ovarian cyst removal.           Assessment   Performance deficits / Impairments: Decreased functional mobility ; Decreased balance;Decreased ADL status; Decreased endurance;Decreased high-level IADLs  Assessment: Pt CGA overall with functional mobility and ADL.  Pt demos good potential for functional improvement with skilled OT.  REQUIRES OT FOLLOW-UP: Yes  Activity Tolerance  Activity Tolerance: Patient Tolerated treatment well        Plan   Occupational Therapy Plan  Current Treatment Recommendations: Balance training, Functional mobility training, Endurance training, Strengthening, Patient/Caregiver education & training, Equipment evaluation, education, & procurement, Self-Care / ADL, Home management training, Coordination training, Safety education & training     Restrictions  Restrictions/Precautions  Restrictions/Precautions: Fall Risk    Subjective   General  Patient assessed for rehabilitation services?: Yes  Family / Caregiver Present: Yes (daughter)  Diagnosis: cauda equina compression     Social/Functional History  Social/Functional History  Lives With: Family, Daughter (2 grandkids)  Home Layout: One level  Home Access: Stairs to enter with rails  Entrance Stairs - Number of Steps: 3 (L handrail ascending)  Bathroom Shower/Tub: Tub/Shower unit, Walk-in shower  Bathroom Toilet: Standard  Bathroom Equipment: Shower chair  Has the
Occupational Therapy  Facility/Department: [facility] 8 REHAB UNIT  Occupational Therapy Treatment Note    Name: Lety Anderson  : 1965  MRN: [id number]  Date of Service: 2023    Discharge Recommendations:  Home with Home health OT, Home with assist PRN          Patient Diagnosis(es): There were no encounter diagnoses. Past Medical History:  has a past medical history of Cauda equina compression (HCC) and COPD (chronic obstructive pulmonary disease) ([address] Past Surgical History:  has a past surgical history that includes Hysterectomy and ovarian cyst removal.           Assessment      REQUIRES OT FOLLOW-UP: No  Activity Tolerance  Activity Tolerance: Patient Tolerated treatment well        Plan   Occupational Therapy Plan  Current Treatment Recommendations: Balance training, Functional mobility training, Endurance training, Strengthening, Home management training, Equipment evaluation, education, & procurement     Restrictions  Restrictions/Precautions  Restrictions/Precautions: Up Ad Whit    Subjective   General  Patient assessed for rehabilitation services?: Yes  Family / Caregiver Present: No  Diagnosis: cauda equina compression  Subjective  Subjective: pt reports R side lower back feels weak, especially when reaching toward floor.      Social/Functional History  Social/Functional History  Lives With: Family, Daughter (2 grandkids)  Home Layout: One level  Home Access: Stairs to enter with rails  Entrance Stairs - Number of Steps: 3 (L handrail ascending)  Bathroom Shower/Tub: Tub/Shower unit, Walk-in shower  Bathroom Toilet: Standard  Bathroom Equipment: Shower chair  Has the patient had two or more falls in the past year or any fall with injury in the past year?: Yes (related to dx- R leg weakness/giving out)  Receives Help From: Family  ADL Assistance: [id number] Independent  Homemaking Responsibilities: Yes  Ambulation Assistance: Independent  Transfer Assistance: Independent  Active
Occupational Therapy  Facility/Department: [facility] 8 REHAB UNIT  Occupational Therapy Treatment Note    Name: Norma Pleitez  : 1965  MRN: [id number]  Date of Service: 2023    Discharge Recommendations:  Home with Home health OT, Home with assist PRN          Patient Diagnosis(es): There were no encounter diagnoses. Past Medical History:  has a past medical history of Cauda equina compression (HCC) and COPD (chronic obstructive pulmonary disease) ([address] Past Surgical History:  has a past surgical history that includes Hysterectomy and ovarian cyst removal.           Assessment   Performance deficits / Impairments: Decreased functional mobility ; Decreased endurance;Decreased high-level IADLs  Assessment: Pt performing functional mobility activities with supervision, no AD. Pt fatigues after ~15 min activity, requiring 2-3 min seated rest. EC/WS strategies recommended for IADL  Activity Tolerance  Activity Tolerance: Patient Tolerated treatment well  Activity Tolerance Comments: pt takes a couple of short rest breaks during ther ex, after 15 min balance/mobility tasks        Plan   Occupational Therapy Plan  Current Treatment Recommendations: Balance training, Functional mobility training, Endurance training, Strengthening, Home management training, Equipment evaluation, education, & procurement     Restrictions  Restrictions/Precautions  Restrictions/Precautions: Up Ad Whit    Subjective   General  Patient assessed for rehabilitation services?: Yes  Family / Caregiver Present: No  Diagnosis: cauda equina compression  Subjective  Subjective: pt reports R side lower back feels weak, especially when reaching toward floor.   mild; did not numerically rate  Social/Functional History  Social/Functional History  Lives With: Family, Daughter (2 grandkids)  Home Layout: One level  Home Access: Stairs to enter with rails  Entrance Stairs - Number of Steps: 3 (L handrail ascending)  Bathroom Shower/Tub: Tub/Shower unit,
Patient:   Amrita Figueroa  MR#:    [id number]   Room:    0827/827-02   YOB: 1965  Date of Progress Note: 9/5/2023  Time of Note                           9:12 AM  Consulting Physician:   Rock Horn M.D. Attending Physician:  Rock Horn MD     CHIEF COMPLAINT: Back pain and leg weakness     Subjective  This 63 y.o. 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 She presented to [facility] ER on 8/24/23 with a new problem of progressively worsening lumbar back and right leg pain and recurrent falls. Physical exam findings of increased lower extremity tone, lower extremity hyperreflexia, and right Babinski. Imaging showed intradural blood products from T5-S1; no significant central canal stenosis throughout the thoracic or lumbar spine, and bladder distention. Santos catheter was placed for urinary retention. Patient also reports having very watery stools and not being able to control her bowels. Neurosurgery was consulted. He was seen by Dr. Davina Garcia who felt diffiretial diagnosis of spinal dural AVF or Spinal AVM. Due to her potential need for endovascular treatmetn, he recommended a tertiary center. Patient was accepted for transfer to [facility] for cauda equina work-up and concern for thrombus around her aorta. CTA of abdomen done shoted no evidence of arterial stenosis or acute arterial occlusion. Spinal angiogram done showed no acute abnormality. Patient not felt to need any surgical intervention. Santos catheter has been discontinued and patient is urinating on her own. Patient currently with constipation. Patient continues to have lower extremity weakness. She is participating in both PT/OT.   No complaints today  REVIEW OF SYSTEMS:  Constitutional: No fevers No
Patient:   Jyothi Katz  MR#:    [id number]   Room:    0827/827-02   YOB: 1965  Date of Progress Note: 9/4/2023  Time of Note                           1:03 PM  Consulting Physician:   Karma Vivar M.D. Attending Physician:  Karma Vivar MD     CHIEF COMPLAINT: Back pain and leg weakness     Subjective  This 63 y.o. 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 She presented to [facility] ER on 8/24/23 with a new problem of progressively worsening lumbar back and right leg pain and recurrent falls. Physical exam findings of increased lower extremity tone, lower extremity hyperreflexia, and right Babinski. Imaging showed intradural blood products from T5-S1; no significant central canal stenosis throughout the thoracic or lumbar spine, and bladder distention. Santos catheter was placed for urinary retention. Patient also reports having very watery stools and not being able to control her bowels. Neurosurgery was consulted. He was seen by Dr. Leela Garcia who felt diffiretial diagnosis of spinal dural AVF or Spinal AVM. Due to her potential need for endovascular treatmetn, he recommended a tertiary center. [facility] cauda equina work-up and concern for thrombus around her aorta. CTA of abdomen done shoted no evidence of arterial stenosis or acute arterial occlusion. Spinal angiogram done showed no acute abnormality. Patient not felt to need any surgical intervention. Santos catheter has been discontinued and patient is urinating on her own. Patient currently with constipation. Patient continues to have lower extremity weakness. She is participating in both PT/OT.   Complains of trouble moving her bowels although did have a good bowel
Physical Therapy  Name: Luisito Christensen  MRN:  [id number]  Date of service:  9/4/2023 09/04/23 0800   General Comment   Comments sitting eob upon entry   Subjective   Subjective Pt states she was a little groggy this am from pn meds early morning hours. Pt reports some pressure and mild discomfort. Subjective   Subjective pressure and mild discomfort   Pain mild but not formally quantified   Transfers   Sit to Stand Independent   Stand to Sit Independent   Bed to Chair Modified independent  (stand step with RW)   Ambulation   Surface Level tile   Device Rolling [employer] Stand by assistance   Quality of Gait good pace with reciprocal patttern   Gait Deviations Slow Patricia  (mildly slower than her usual according to pt report)   Distance 200'   Ambulation 2   Surface - 2 level tile   Device 2 Rollator   Assistance 2 Stand by assistance   Quality of Gait 2 as above   Distance 200'   Comments no balance concerns   Stairs/Curb   Stairs?  Yes   Stairs   # Steps  7   Stairs Height 6\"  (both 4\" and 6\" performed)   Rails Left ascending  (as per home)   Assistance Stand by assistance   Comment started with step to pattern but quickly transitioned to reciprocal pattern with good confidence and stability and little use of UE for wbing on HR   Wheelchair Activities   Wheelchair Parts Management Yes   Left Leg Rest Level of Assistance Contact guard assistance;Stand by assistance  (release and swing)   Right Leg Rest Level of Assistance Contact guard assistance;Stand by assistance  (release and swing)   Left Brakes Level of Assistance Supervision   Right Brakes Level of Assistance Supervision   Propulsion Yes   Propulsion 1   Propulsion Manual   Level Level Tile   Method RUE;LUE   Level of Assistance Independent   Description/ Details good maneuvering with fwd,bkwd and turning during propulsion   Distance 150'   PT Exercises   Resistive Exercises B LE seated manual resist x 20: laq, hip flex, hip ext, hip abd, hip add,
Physical Therapy  Name: Nora Gibbons  MRN:  [id number]  Date of service:  9/5/2023 09/05/23 0800   Restrictions/Precautions   Restrictions/Precautions Up Ad Whit   General Comment   Comments awake in bed   Subjective   Subjective Pt agreeable and w/o complaint. Hopes to return home tomorrow.    Subjective   Subjective denies any pain   Transfers   Car Transfer Independent  (pt able to step into car without difficulty)   Ambulation   Surface Level tile   Device No Device   Assistance Independent;Supervision   Quality of Gait good pace and stability with good arm swing and management of direction changes and obstacles   Gait Deviations None   Distance 1000'   Comments amb with frequent direction changes,speed changes,  turns, sidestepping, head turns, head nods and door management, all without difficulty or complaint   Wheelchair Activities   Left Leg Rest Level of Assistance Independent  (release and swing)   Right Leg Rest Level of Assistance Independent  (release and swing)   Left Brakes Level of Assistance Independent   Right Brakes Level of Assistance Independent   Propulsion Yes   Propulsion 1   Propulsion Manual   Level Level Tile   Method RUE;LUE   Level of Assistance Independent   Description/ Details good maneuvering with fwd,bkwd and turning during propulsion   Distance 150'   PT Exercises   Static Standing Balance Exercises red tband standing ex: paloff press x 15 ea, semi tandem stance shoulder ext x 15 ea  (pt noted with slight trembling and indicated noticeable difficulty though denies pain)   Dynamic Standing Balance Exercises tandem gt x 10' with slow deliberate movement and occ use of // bars to steady self   Motor Control/Coordination seated on therapy ball x 10: marching and LAQ with core contraction   Standing Open/Closed Kinetic Chain Exercises // bars with light to no use of rails x 15: heel raises, toe raises, marching in place, hip abd, hip ext   Assessment   Assessment Pt EDU engaging
IMPAIRMENTS.    PATIENT GOAL FOR REHAB:  RETURN TO PRIOR LEVEL OF FUNCTION       IRF/KAITLIN  Roll Left and Right  Assistance Needed: Supervision or touching assistance  CARE Score: 4  Discharge Goal: Independent    Sit to Lying  Assistance Needed: Supervision or touching assistance  CARE Score: 4  Discharge Goal: Independent    Lying to Sitting on Side of Bed  Assistance Needed: Supervision or touching assistance  CARE Score: 4  Discharge Goal: Independent    Sit to Stand  Assistance Needed: Supervision or touching assistance  CARE Score: 4  Discharge Goal: Independent    Chair/Bed-to-Chair Transfer  Assistance Needed: Supervision or touching assistance  CARE Score: 4  Discharge Goal: Independent    Car Transfer  Assistance Needed: Supervision or touching assistance  CARE Score: 4  Discharge Goal: Independent    Walk 10 Feet  Assistance Needed: Partial/moderate assistance  CARE Score: 3  Discharge Goal: Independent    Walk 50 Feet with Two Turns  Assistance Needed: Partial/moderate assistance  CARE Score: 3  Discharge Goal: Independent    Walk 150 Feet  Reason if not Attempted: Not attempted due to medical condition or safety concerns  CARE Score: 88  Discharge Goal: Independent    Walking 10 Feet on Uneven Surfaces  Reason if not Attempted: Not attempted due to medical condition or safety concerns  CARE Score: 88  Discharge Goal: Independent    1 Step (Curb)  Reason if not Attempted: Not attempted due to medical condition or safety concerns  CARE Score: 88  Discharge Goal: Independent    4 Steps  Reason if not Attempted: Not attempted due to medical condition or safety concerns  CARE Score: 88  Discharge Goal: Independent    12 Steps  Reason if not Attempted: Not attempted due to medical condition or safety concerns  CARE Score: 88  Discharge Goal: Not Attempted    Wheel 50 Feet with Two Turns  Assistance Needed: Supervision or touching assistance  CARE Score: 4  Discharge Goal: Independent    Wheel 150 Feet  Reason if

## 2023-09-05 NOTE — PLAN OF CARE
Problem: Discharge Planning  Goal: Discharge to home or other facility with appropriate resources  9/4/2023 2132 by Jacob Sykes RN  Outcome: Progressing  9/4/2023 1103 by Raul Ojeda RN  Outcome: Progressing     Problem: Safety - Adult  Goal: Free from fall injury  9/4/2023 2132 by Jacob Sykes RN  Outcome: Progressing  Flowsheets (Taken 9/4/2023 2132)  Free From Fall Injury: Instruct family/caregiver on patient safety  9/4/2023 1103 by Raul Ojeda RN  Outcome: Progressing     Problem: Pain  Goal: Verbalizes/displays adequate comfort level or baseline comfort level  9/4/2023 2132 by Jacob Sykes RN  Outcome: Progressing  9/4/2023 1103 by Raul Ojeda RN  Outcome: Progressing

## 2023-09-06 VITALS
DIASTOLIC BLOOD PRESSURE: 72 MMHG | OXYGEN SATURATION: 100 % | HEIGHT: 69 IN | RESPIRATION RATE: 18 BRPM | BODY MASS INDEX: 21.41 KG/M2 | TEMPERATURE: 97.3 F | SYSTOLIC BLOOD PRESSURE: 115 MMHG | HEART RATE: 80 BPM

## 2023-09-06 PROCEDURE — 99239 HOSP IP/OBS DSCHRG MGMT >30: CPT | Performed by: PSYCHIATRY & NEUROLOGY

## 2023-09-06 PROCEDURE — 6370000000 HC RX 637 (ALT 250 FOR IP): Performed by: PSYCHIATRY & NEUROLOGY

## 2023-09-06 PROCEDURE — 94760 N-INVAS EAR/PLS OXIMETRY 1: CPT

## 2023-09-06 RX ORDER — LOSARTAN POTASSIUM 50 MG/1
50 TABLET ORAL DAILY
Qty: 30 TABLET | Refills: 3 | Status: SHIPPED | OUTPATIENT
Start: 2023-09-06

## 2023-09-06 RX ORDER — SENNA AND DOCUSATE SODIUM 50; 8.6 MG/1; MG/1
1 TABLET, FILM COATED ORAL 2 TIMES DAILY
Qty: 60 TABLET | Refills: 0 | Status: SHIPPED | OUTPATIENT
Start: 2023-09-06

## 2023-09-06 RX ORDER — OXYCODONE HYDROCHLORIDE AND ACETAMINOPHEN 5; 325 MG/1; MG/1
1 TABLET ORAL EVERY 6 HOURS PRN
Qty: 15 TABLET | Refills: 0 | Status: SHIPPED | OUTPATIENT
Start: 2023-09-06 | End: 2023-09-13

## 2023-09-06 RX ADMIN — CYANOCOBALAMIN TAB 500 MCG 1000 MCG: 500 TAB at 08:12

## 2023-09-06 RX ADMIN — Medication 5000 UNITS: at 08:12

## 2023-09-06 RX ADMIN — LOSARTAN POTASSIUM 50 MG: 50 TABLET, FILM COATED ORAL at 08:12

## 2023-09-06 RX ADMIN — SENNOSIDES AND DOCUSATE SODIUM 1 TABLET: 50; 8.6 TABLET ORAL at 08:12

## 2023-09-06 RX ADMIN — Medication 1 TABLET: at 08:12

## 2023-09-06 SDOH — ECONOMIC STABILITY: FOOD INSECURITY: WITHIN THE PAST 12 MONTHS, YOU WORRIED THAT YOUR FOOD WOULD RUN OUT BEFORE YOU GOT MONEY TO BUY MORE.: NEVER TRUE

## 2023-09-06 SDOH — ECONOMIC STABILITY: INCOME INSECURITY: IN THE PAST 12 MONTHS, HAS THE ELECTRIC, GAS, OIL, OR WATER COMPANY THREATENED TO SHUT OFF SERVICE IN YOUR HOME?: NO

## 2023-09-06 SDOH — ECONOMIC STABILITY: INCOME INSECURITY: HOW HARD IS IT FOR YOU TO PAY FOR THE VERY BASICS LIKE FOOD, HOUSING, MEDICAL CARE, AND HEATING?: NOT VERY HARD

## 2023-09-06 ASSESSMENT — PATIENT HEALTH QUESTIONNAIRE - PHQ9
2. FEELING DOWN, DEPRESSED OR HOPELESS: 1
1. LITTLE INTEREST OR PLEASURE IN DOING THINGS: 0
SUM OF ALL RESPONSES TO PHQ QUESTIONS 1-9: 1
SUM OF ALL RESPONSES TO PHQ9 QUESTIONS 1 & 2: 1
SUM OF ALL RESPONSES TO PHQ QUESTIONS 1-9: 1

## 2023-09-06 NOTE — PLAN OF CARE
Problem: Discharge Planning  Goal: Discharge to home or other facility with appropriate resources  9/5/2023 2126 by Abundio Vo RN  Outcome: Progressing  9/5/2023 0936 by Gladis Sharp RN  Outcome: Progressing     Problem: Safety - Adult  Goal: Free from fall injury  9/5/2023 2126 by Abundio Vo RN  Outcome: Progressing  9/5/2023 0936 by Gladis Sharp RN  Outcome: Progressing     Problem: Pain  Goal: Verbalizes/displays adequate comfort level or baseline comfort level  9/5/2023 2126 by Abundio Vo RN  Outcome: Progressing  9/5/2023 0936 by Gladis Sharp RN  Outcome: Progressing

## 2023-09-06 NOTE — DISCHARGE SUMMARY
Physical Therapy Discharge Note  DATE:  2023  NAME:  Luisito Christensen  :  1965  (63 y.o.,female)  MRN:  [id number]    HEIGHT:  Height: 5' 9\" (175.3 cm)  WEIGHT:       PATIENT DIAGNOSIS(ES):    Diagnosis: Cauda Equina Syndrome    Additional Pertinent Hx: HTN, COPD, Tobacco abuse, chronic back pain  RESTRICTIONS/PRECAUTIONS:    Restrictions/Precautions  Restrictions/Precautions: Up Ad Whit     OVERALL  ORIENTATION STATUS:  Overall Orientation Status: Within Normal Limits  PAIN:  Pain Level: 2       Pain Location: Back     Pain Orientation: Lower      GROSS ASSESSMENT        POSTURE/BALANCE  Balance  Standing - Dynamic: Fair (Able to reach to flush toilet and turn on light)       ACTIVITY TOLERANCE  Activity Tolerance  Activity Tolerance: Patient tolerated evaluation without incident      BED MOBILITY  Bed mobility  Rolling to Left: Independent  Rolling to Right: Independent  Supine to Sit: Independent  Sit to Supine: Independent        TRANSFERS  Transfers  Sit to Stand: Independent  Stand to Sit: Modified independent  Bed to Chair: Modified independent  Car Transfer: Independent (pt able to step into car without difficulty)       AMBULATION 1  Ambulation  Surface: Level tile  Device: No Device  Other Apparatus: Wheelchair follow  Assistance: Independent, Supervision  Quality of Gait: NO ISSUES  Gait Deviations: None  Distance: 1000+ '  Comments: amb with frequent direction changes,speed changes,  turns, sidestepping, head turns, head nods and door management, all without difficulty or complaint  More Ambulation?: Yes  AMBULATION 2  Ambulation 2  Surface - 2: level tile  Device 2: Rollator  Assistance 2: Modified Independent  Quality of Gait 2: APPROPRIATE USE AND AMBULATION ABILITY; NO DEVIATIONS NOTED  Gait Deviations: Slow Patricia, Decreased step length, Decreased step height  Distance: 1000~ FT  Comments: AMBULATED WITH MULTIPLE L&R TURNS, NO BALANCE ISSUES NOTED  STAIRS  Stairs  # Steps : 35  Stairs Height:

## 2023-09-06 NOTE — DISCHARGE SUMMARY
Neurology Discharge Summary     Patient Identification:  Lety Anderson is a 63 y.o. female. :  1965  Admit Date:  2023  Discharge date : 2023   Attending Provider: Yue Germain MD     Account Number: [de-identified]                                   Admission Diagnoses:   Cauda equina compression [facility]) [G83.4]    Discharge Diagnoses:  Principal Problem:    Cauda equina compression [facility])  Resolved Problems:    * No resolved hospital problems. *      Discharge Medications:    Current Discharge Medication List             Details   losartan (COZAAR) 50 MG tablet Take 1 tablet by mouth daily  Qty: 30 tablet, Refills: 3                Details   oxyCODONE-acetaminophen (PERCOCET) 5-325 MG per tablet Take 1 tablet by mouth every 6 hours as needed for Pain for up to 7 days. Max Daily Amount: 4 tablets  Qty: 15 tablet, Refills: 0    Comments: Reduce doses taken as pain becomes manageable  Associated Diagnoses: Cauda equina compression (HCC)      sennosides-docusate sodium (SENOKOT-S) 8.6-50 MG tablet Take 1 tablet by mouth 2 times daily  Qty: 60 tablet, Refills: 0                Details   vitamin D (CHOLECALCIFEROL) 125 MCG (5000 UT) CAPS capsule Take 1 capsule by mouth daily      cyanocobalamin 1000 MCG tablet Take 1 tablet by mouth daily      ergocalciferol (ERGOCALCIFEROL) 1.25 MG (24651 UT) capsule Take 1 capsule by mouth once a week      adalimumab (HUMIRA) 40 MG/0.8ML injection Inject 0.8 mLs into the skin every 14 days      b complex vitamins capsule Take 1 capsule by mouth daily           Current Discharge Medication List        STOP taking these medications       nicotine (NICODERM CQ) 21 MG/24HR Comments:   Reason for Stopping:         polyethylene glycol (GLYCOLAX) 17 g packet Comments:   Reason for Stopping:         albuterol sulfate HFA (VENTOLIN HFA) 108 (90 BASE) MCG/ACT inhaler Comments:   Reason for Stopping:                 Consults:   none    Hospital Course:   This 63 y.o. female  with a

## 2023-09-06 NOTE — DISCHARGE SUMMARY
Occupational Therapy Discharge Summary         Date: 2023  Patient Name: Rodrigo Crook        MRN: [id number]    : 1965  (63 y.o.)  Gender: female      Diagnosis: Cauda Equina Syndrome  Restrictions/Precautions  Restrictions/Precautions: Up Ad Whit      Discharge Date: 23      UE Functioning:  WFLs    Home Management:  Functional Mobility  Functional - Mobility Device: no device  Activity: ADLs/IADLs  Assist Level: Ind     Adaptive Equipment/DME Status:  Bathroom Equipment: Shower chair         Pain Assessment:          Remaining Problems:  Decreased high-level IADLs    STGs:  Short Term Goals  Time Frame for Short Term Goals: 1 week  Short Term Goal 1: Pt will toilet with Supervision  Short Term Goal 2: Pt will perform toilet transfer with supervision  Short Term Goal 3: Pt will bathe with supervision, DME  Short Term Goal 4: Pt will dress LB with supervision  Short Term Goal 5: Pt will perform ambulatory light homemaking task with supervision  Additional Goals?: Yes  Short Term Goal 6: Pt will perform dynamic standing activities with min balance challenges x 5 mins, SBA  Met all STGs    LTGs:  Long Term Goals  Time Frame for Long Term Goals : 2 weeks  Long Term Goal 1: Pt will toilet with mod I  Long Term Goal 2: Pt will perform toilet transfer with mod I  Long Term Goal 3: Pt will shower with mod I, DME prn  Long Term Goal 4: Pt will complete overall UB/LB dressing with mod I  Long Term Goal 5: Pt will perform ambulatory home making tasks with mod I  Additional Goals?: Yes  Long Term Goal 6: Pt will tolerate 15 min dynamic standing activities/functional mobility tasks with good safety, no LOB, mod I  Long Term Goal 7: Pt/family indep in ther activity/DME/ADL and IADL strategies and recommendations after ed  Met all LTGs    Discharge Setting and Recommendations:  Home with family and HEP.      Discharge Care Scores    Eating: CARE Score: 6  Oral Hygiene: CARE Score: 6  Toileting: CARE Score:

## 2023-09-26 ENCOUNTER — HOSPITAL ENCOUNTER (EMERGENCY)
Age: 58
Discharge: HOME | End: 2023-09-26
Payer: MEDICAID

## 2023-09-26 ENCOUNTER — APPOINTMENT (OUTPATIENT)
Dept: CT IMAGING | Age: 58
End: 2023-09-26
Payer: MEDICAID

## 2023-09-26 VITALS
DIASTOLIC BLOOD PRESSURE: 98 MMHG | WEIGHT: 145 LBS | TEMPERATURE: 98.4 F | HEART RATE: 99 BPM | RESPIRATION RATE: 18 BRPM | OXYGEN SATURATION: 100 % | BODY MASS INDEX: 21.41 KG/M2 | SYSTOLIC BLOOD PRESSURE: 152 MMHG

## 2023-09-26 DIAGNOSIS — M54.50 ACUTE LOW BACK PAIN WITHOUT SCIATICA, UNSPECIFIED BACK PAIN LATERALITY: Primary | ICD-10-CM

## 2023-09-26 LAB
ALBUMIN SERPL-MCNC: 4.2 G/DL (ref 3.5–5.2)
ALP SERPL-CCNC: 80 U/L (ref 35–104)
ALT SERPL-CCNC: 12 U/L (ref 5–33)
ANION GAP SERPL CALCULATED.3IONS-SCNC: 13 MMOL/L (ref 7–19)
AST SERPL-CCNC: 14 U/L (ref 5–32)
BASOPHILS # BLD: 0.1 K/UL (ref 0–0.2)
BASOPHILS NFR BLD: 0.7 % (ref 0–1)
BILIRUB SERPL-MCNC: 0.5 MG/DL (ref 0.2–1.2)
BUN SERPL-MCNC: 10 MG/DL (ref 6–20)
CALCIUM SERPL-MCNC: 10 MG/DL (ref 8.6–10)
CHLORIDE SERPL-SCNC: 101 MMOL/L (ref 98–111)
CO2 SERPL-SCNC: 23 MMOL/L (ref 22–29)
CREAT SERPL-MCNC: 0.8 MG/DL (ref 0.5–0.9)
EOSINOPHIL # BLD: 0.1 K/UL (ref 0–0.6)
EOSINOPHIL NFR BLD: 1.1 % (ref 0–5)
ERYTHROCYTE [DISTWIDTH] IN BLOOD BY AUTOMATED COUNT: 14.6 % (ref 11.5–14.5)
GLUCOSE SERPL-MCNC: 118 MG/DL (ref 74–109)
HCT VFR BLD AUTO: 42.7 % (ref 37–47)
HGB BLD-MCNC: 14.3 G/DL (ref 12–16)
IMM GRANULOCYTES # BLD: 0 K/UL
LYMPHOCYTES # BLD: 2.5 K/UL (ref 1.1–4.5)
LYMPHOCYTES NFR BLD: 25.2 % (ref 20–40)
MCH RBC QN AUTO: 30.8 PG (ref 27–31)
MCHC RBC AUTO-ENTMCNC: 33.5 G/DL (ref 33–37)
MCV RBC AUTO: 91.8 FL (ref 81–99)
MONOCYTES # BLD: 0.6 K/UL (ref 0–0.9)
MONOCYTES NFR BLD: 5.5 % (ref 0–10)
NEUTROPHILS # BLD: 6.8 K/UL (ref 1.5–7.5)
NEUTS SEG NFR BLD: 67.1 % (ref 50–65)
PLATELET # BLD AUTO: 225 K/UL (ref 130–400)
PMV BLD AUTO: 10.8 FL (ref 9.4–12.3)
POTASSIUM SERPL-SCNC: 3.9 MMOL/L (ref 3.5–5)
PROT SERPL-MCNC: 7.3 G/DL (ref 6.6–8.7)
RBC # BLD AUTO: 4.65 M/UL (ref 4.2–5.4)
SODIUM SERPL-SCNC: 137 MMOL/L (ref 136–145)
WBC # BLD AUTO: 10.1 K/UL (ref 4.8–10.8)

## 2023-09-26 PROCEDURE — 99284 EMERGENCY DEPT VISIT MOD MDM: CPT

## 2023-09-26 PROCEDURE — 74176 CT ABD & PELVIS W/O CONTRAST: CPT

## 2023-09-26 PROCEDURE — 85025 COMPLETE CBC W/AUTO DIFF WBC: CPT

## 2023-09-26 PROCEDURE — 36415 COLL VENOUS BLD VENIPUNCTURE: CPT

## 2023-09-26 PROCEDURE — 80053 COMPREHEN METABOLIC PANEL: CPT

## 2023-09-26 RX ORDER — AMLODIPINE BESYLATE 10 MG/1
1 TABLET ORAL DAILY
COMMUNITY
Start: 2023-08-22

## 2023-09-26 RX ORDER — CYCLOBENZAPRINE HCL 10 MG
10 TABLET ORAL 3 TIMES DAILY PRN
COMMUNITY
Start: 2023-08-09

## 2023-09-26 RX ORDER — OXYCODONE HYDROCHLORIDE AND ACETAMINOPHEN 5; 325 MG/1; MG/1
1 TABLET ORAL EVERY 6 HOURS PRN
Qty: 6 TABLET | Refills: 0 | Status: SHIPPED | OUTPATIENT
Start: 2023-09-26 | End: 2023-09-29

## 2023-09-26 RX ORDER — LISINOPRIL 20 MG/1
1 TABLET ORAL DAILY
COMMUNITY
Start: 2023-08-22

## 2023-09-26 RX ORDER — KETOCONAZOLE 20 MG/G
1 CREAM TOPICAL 2 TIMES DAILY
COMMUNITY

## 2023-09-26 ASSESSMENT — ENCOUNTER SYMPTOMS
SHORTNESS OF BREATH: 0
VOMITING: 0
ABDOMINAL PAIN: 0
DIARRHEA: 0
NAUSEA: 0
COUGH: 0
BACK PAIN: 1

## 2023-09-26 NOTE — ED PROVIDER NOTES
tenderness, left CVA tenderness or guarding. Musculoskeletal:      Lumbar back: Tenderness present. No swelling, edema, deformity, signs of trauma, lacerations, spasms or bony tenderness. Normal range of motion. Negative right straight leg raise test and negative left straight leg raise test. No scoliosis. Back:       Comments: Distal NVI   Neurological:      Mental Status: She is alert. DIAGNOSTIC RESULTS     EKG: All EKG's are interpreted by the Emergency Department Physician who either signs or Co-signs this chart in the absence of a cardiologist.        RADIOLOGY:   Non-plain film images such as CT, Ultrasound and MRI are read by the radiologist. Plain radiographic images are visualized and preliminarily interpreted by the emergency physician with the below findings:        Interpretation per the Radiologist below, if available at the time of this note:    CT ABDOMEN PELVIS WO CONTRAST Additional Contrast? None   Final Result   No acute findings in the abdomen or pelvis. No acute compression fractures are identified. All CT scans are performed using dose optimization techniques as appropriate to the performed exam and include    at least one of the following: Automated exposure control, adjustment of the mA and/or kV according to size, and the use of iterative reconstruction technique. ______________________________________    Electronically signed by: Ferny Patel M.D.    Date:     09/26/2023   Time:    18:20             ED BEDSIDE ULTRASOUND:   Performed by ED Physician - none    LABS:  Labs Reviewed   CBC WITH AUTO DIFFERENTIAL - Abnormal; Notable for the following components:       Result Value    RDW 14.6 (*)     Neutrophils % 67.1 (*)     All other components within normal limits   COMPREHENSIVE METABOLIC PANEL - Abnormal; Notable for the following components:    Glucose 118 (*)     All other components within normal limits   URINALYSIS WITH REFLEX TO CULTURE       All

## 2023-09-26 NOTE — ED NOTES
Pt refused to have vital signs taken before discharge. Pt stated she was disappointed in not getting any pain medications. Explained to pt that provider sent in prescription for pain meds. Pt signed AVS and left ED.      Kathya Vizcarra RN  09/26/23 0789

## 2023-10-30 LAB
QT INTERVAL: 390 MS
QT INTERVAL: 430 MS
QT INTERVAL: 460 MS
QTC INTERVAL: 399 MS
QTC INTERVAL: 410 MS
QTC INTERVAL: 435 MS

## 2024-01-08 ENCOUNTER — TRANSCRIBE ORDERS (OUTPATIENT)
Dept: ADMINISTRATIVE | Facility: HOSPITAL | Age: 59
End: 2024-01-08
Payer: COMMERCIAL

## 2024-01-08 DIAGNOSIS — I62.00 NONTRAUMATIC SUBDURAL HEMORRHAGE, UNSPECIFIED: Primary | ICD-10-CM

## 2024-01-25 ENCOUNTER — HOSPITAL ENCOUNTER (OUTPATIENT)
Dept: MRI IMAGING | Facility: HOSPITAL | Age: 59
Discharge: HOME | End: 2024-01-25
Payer: COMMERCIAL

## 2024-01-25 DIAGNOSIS — I62.00 NONTRAUMATIC SUBDURAL HEMORRHAGE, UNSPECIFIED: ICD-10-CM

## 2024-01-25 LAB — CREAT BLDA-MCNC: 1 MG/DL (ref 0.6–1.3)

## 2024-01-25 PROCEDURE — 72157 MRI CHEST SPINE W/O & W/DYE: CPT

## 2024-01-25 PROCEDURE — 82565 ASSAY OF CREATININE: CPT

## 2024-01-25 PROCEDURE — A9577 INJ MULTIHANCE: HCPCS | Performed by: PHYSICIAN ASSISTANT

## 2024-01-25 PROCEDURE — 72158 MRI LUMBAR SPINE W/O & W/DYE: CPT

## 2024-01-25 PROCEDURE — 0 GADOBENATE DIMEGLUMINE 529 MG/ML SOLUTION: Performed by: PHYSICIAN ASSISTANT

## 2024-01-25 RX ADMIN — GADOBENATE DIMEGLUMINE 11 ML: 529 INJECTION, SOLUTION INTRAVENOUS at 10:16

## 2024-04-30 ENCOUNTER — OFFICE VISIT (OUTPATIENT)
Dept: GASTROENTEROLOGY | Facility: CLINIC | Age: 59
End: 2024-04-30
Payer: COMMERCIAL

## 2024-04-30 VITALS
DIASTOLIC BLOOD PRESSURE: 88 MMHG | BODY MASS INDEX: 20.73 KG/M2 | OXYGEN SATURATION: 97 % | TEMPERATURE: 97.7 F | HEIGHT: 69 IN | HEART RATE: 93 BPM | WEIGHT: 140 LBS | RESPIRATION RATE: 20 BRPM | SYSTOLIC BLOOD PRESSURE: 140 MMHG

## 2024-04-30 DIAGNOSIS — R19.4 CHANGE IN BOWEL HABITS: Primary | ICD-10-CM

## 2024-04-30 PROCEDURE — 1160F RVW MEDS BY RX/DR IN RCRD: CPT | Performed by: NURSE PRACTITIONER

## 2024-04-30 PROCEDURE — 3079F DIAST BP 80-89 MM HG: CPT | Performed by: NURSE PRACTITIONER

## 2024-04-30 PROCEDURE — 3077F SYST BP >= 140 MM HG: CPT | Performed by: NURSE PRACTITIONER

## 2024-04-30 PROCEDURE — 99214 OFFICE O/P EST MOD 30 MIN: CPT | Performed by: NURSE PRACTITIONER

## 2024-04-30 PROCEDURE — 1159F MED LIST DOCD IN RCRD: CPT | Performed by: NURSE PRACTITIONER

## 2024-04-30 RX ORDER — TRIAMCINOLONE ACETONIDE 1 MG/G
CREAM TOPICAL
COMMUNITY
Start: 2024-02-12

## 2024-04-30 RX ORDER — POLYETHYLENE GLYCOL 3350 17 G/17G
17 POWDER ORAL 2 TIMES DAILY
COMMUNITY
Start: 2024-03-28

## 2024-04-30 RX ORDER — LOSARTAN POTASSIUM 50 MG/1
1 TABLET ORAL DAILY
COMMUNITY
Start: 2024-03-11

## 2024-04-30 NOTE — PATIENT INSTRUCTIONS
I have recommended utilizing Miralax 1-2 doses daily until able to have a bowel movement   Ok to use Exlax in addition to Miralax to help facilitate bowel movement  Once bowel movement is achieved stop taking Exlax but continue taking Miralax on daily basis.  Dose of Miralax may  need to be adjusted to facilitate bowel movement   If your stools become too loose or too frequent reduce the amount of Miralax taken daily.    Miralax is safe and effective for long term relief of chronic constipation.    Drink 6-8 glasses of water daily.  Regular exercise encouraged.  High fiber diet recommended

## 2024-04-30 NOTE — PROGRESS NOTES
"Chief Complaint   Patient presents with    Constipation     Started last August, patient takes Miralax BID and stool softener TID        PCP: Chip Hillman DO  REFER: Chip Hillman DO    Subjective     HPI    Sol Fontenot referred to office for evaluation of constipation.  Bowel change sudden Sept 2023 (started after cyst at end of spine bust).  Current bowel habit described as \"a little bit\" once per day, watery.  No signs GI blood loss.   Miralax and stool softener not providing relief. She has \"tightness\" to left lower side that radiates into lower abdomen.   Prior to change in bowel habit stool described as normal, bowel habit described as normal and without difficulty.     COLONOSCOPY (04/21/2021 12:15)   Tissue Pathology Exam (04/21/2021 12:26) -tubular adenoma - 5 years     COLONOSCOPY (04/20/2021 12:28)   Past Medical History:   Diagnosis Date    Arthritis     Asthma     COPD (chronic obstructive pulmonary disease)     High cholesterol     High cholesterol     Kidney stone     Psoriasis        Past Surgical History:   Procedure Laterality Date    ANGIOGRAM - CONVERTED      x2    COLONOSCOPY N/A 04/20/2021    Procedure: COLONOSCOPY WITH ANESTHESIA;  Surgeon: Ti Lea DO;  Location: [facility] ENDOSCOPY;  Service: Gastroenterology;  Laterality: N/A;  pre op: family hx polyps  post op:poor prep  PCP: Chip Hillman DO    COLONOSCOPY N/A 04/21/2021    Procedure: COLONOSCOPY WITH ANESTHESIA;  Surgeon: Ti Lea DO;  Location: [facility] ENDOSCOPY;  Service: Gastroenterology;  Laterality: N/A;  Pre: Family hx Colon Polyps  Post: Colon Polyp  Dr. Chip Hillman  CO2 Inflation Used    HYSTERECTOMY  1996    Complete    OOPHORECTOMY         Outpatient Medications Marked as Taking for the 4/30/24 encounter (Office Visit) with Stephen Kowalski APRN   Medication Sig Dispense Refill    Humira Pen 40 MG/0.8ML Pen-injector Kit Inject 40 mg under the skin into the appropriate area as " "directed Every 14 (Fourteen) Days.      ketoconazole (NIZORAL) 2 % cream Apply 1 Application topically to the appropriate area as directed 2 (Two) Times a Day.      losartan (COZAAR) 50 MG tablet Take 1 tablet by mouth Daily.      PEG 3350 17 GM/SCOOP powder Take 17 g by mouth 2 (Two) Times a Day.      sennosides-docusate (PERICOLACE) 8.6-50 MG per tablet Take 2 tablets by mouth Every Night. Obtain OTC      triamcinolone (KENALOG) 0.1 % cream Apply TO affected AREA sparingly twice A DAY AS directed      vitamin B-12 (CYANOCOBALAMIN) 1000 MCG tablet Take 1 tablet by mouth Daily.      vitamin D (ERGOCALCIFEROL) 1.25 MG (71998 UT) capsule capsule Take 1 capsule by mouth Every 7 (Seven) Days.      vitamin D3 125 MCG (5000 UT) capsule capsule Take 1 capsule by mouth Daily.         Allergies   Allergen Reactions    Gabapentin Hallucinations    Codeine Hives    Keflex [Cephalexin] Hives    Ciprofloxacin Rash       Social History     Socioeconomic History    Marital status:    Tobacco Use    Smoking status: Every Day     Current packs/day: 1.00     Average packs/day: 1 pack/day for 30.0 years (30.0 ttl pk-yrs)     Types: Cigarettes    Smokeless tobacco: Never   Vaping Use    Vaping status: Never Used   Substance and Sexual Activity    Alcohol use: No    Drug use: No    Sexual activity: Yes     Partners: Male     Birth control/protection: Surgical       Review of Systems   Constitutional:  Negative for fever and unexpected weight change.   HENT:  Negative for trouble swallowing.    Respiratory:  Negative for shortness of breath.    Cardiovascular:  Negative for chest pain.   Gastrointestinal:  Positive for constipation. Negative for abdominal pain and anal bleeding.       Objective     Vitals:    04/30/24 0912   BP: 140/88   Pulse: 93   Resp: 20   Temp: 97.7 °F (36.5 °C)   TempSrc: Temporal   SpO2: 97%   Weight: 63.5 kg (140 lb)   Height: 175.3 cm (69\")     Body mass index is 20.67 kg/m².    Physical " Exam  Constitutional:       Appearance: Normal appearance. She is well-developed.   Eyes:      General: No scleral icterus.  Cardiovascular:      Heart sounds: Normal heart sounds. No murmur heard.  Pulmonary:      Effort: Pulmonary effort is normal.   Abdominal:      General: Bowel sounds are normal. There is no distension.      Palpations: Abdomen is soft.      Tenderness: There is no abdominal tenderness. There is no guarding.   Skin:     General: Skin is warm and dry.      Coloration: Skin is not jaundiced.   Neurological:      Mental Status: She is alert.   Psychiatric:         Behavior: Behavior is cooperative.         Imaging Results (Most Recent)       None            Body mass index is 20.67 kg/m².    Assessment & Plan     Diagnoses and all orders for this visit:    1. Change in bowel habits (Primary)  -     Case Request; Standing  -     Implement Anesthesia Orders Day of Procedure; Standing  -     Obtain Informed Consent; Standing  -     Case Request         COLONOSCOPY WITH ANESTHESIA (N/A)    Miralax and ex lax, stop ex lax once bowels start to move    Miralax prep     Recommend daily use of Miralax, adjust as needed  Encouraged addition of dietary fiber, increasing daily water consumption, as well daily physical activity     Patient is to continue all blood pressure and cardiac medications prior to procedure and has been advised to take medications morning of procedure   Pt verbalized understanding     Advised pt to stop use of NSAIDs, Fish Oil, and MV 5 days prior to procedure, per Dr Lea protocol.  Tylenol based products are ok to take.  Pt verbalized understanding.      All risks, benefits, alternatives, and indications of colonoscopy procedure have been discussed with the patient. Risks to include perforation of the colon requiring possible surgery or colostomy, risk of bleeding from biopsies or removal of colon tissue, possibility of missing a colon polyp or cancer, or adverse drug reaction.   Benefits to include the diagnosis and management of disease of the colon and rectum. Alternatives to include barium enema, radiographic evaluation, lab testing or no intervention. Pt verbalizes understanding and agrees to proceed with procedure.       Stephen Kowalski, APRN  05/01/24          Patient Instructions   I have recommended utilizing Miralax 1-2 doses daily until able to have a bowel movement   Ok to use Exlax in addition to Miralax to help facilitate bowel movement  Once bowel movement is achieved stop taking Exlax but continue taking Miralax on daily basis.  Dose of Miralax may  need to be adjusted to facilitate bowel movement   If your stools become too loose or too frequent reduce the amount of Miralax taken daily.    Miralax is safe and effective for long term relief of chronic constipation.    Drink 6-8 glasses of water daily.  Regular exercise encouraged.  High fiber diet recommended

## 2024-05-06 ENCOUNTER — TELEPHONE (OUTPATIENT)
Dept: GASTROENTEROLOGY | Facility: CLINIC | Age: 59
End: 2024-05-06

## 2024-05-06 NOTE — TELEPHONE ENCOUNTER
Caller: Sol Fontenot    Relationship: Self    Best call back number: [phone]     What was the call regarding: PT IS CALLING TO CONFIRM SCOPE FOR TOMORROW. PLEASE ADVISE

## 2024-05-07 ENCOUNTER — HOSPITAL ENCOUNTER (OUTPATIENT)
Facility: HOSPITAL | Age: 59
Setting detail: HOSPITAL OUTPATIENT SURGERY
Discharge: HOME | End: 2024-05-07
Attending: INTERNAL MEDICINE | Admitting: INTERNAL MEDICINE
Payer: COMMERCIAL

## 2024-05-07 ENCOUNTER — ANESTHESIA (OUTPATIENT)
Dept: GASTROENTEROLOGY | Facility: HOSPITAL | Age: 59
End: 2024-05-07
Payer: COMMERCIAL

## 2024-05-07 ENCOUNTER — ANESTHESIA EVENT (OUTPATIENT)
Dept: GASTROENTEROLOGY | Facility: HOSPITAL | Age: 59
End: 2024-05-07
Payer: COMMERCIAL

## 2024-05-07 VITALS
TEMPERATURE: 97.1 F | RESPIRATION RATE: 20 BRPM | OXYGEN SATURATION: 100 % | HEART RATE: 60 BPM | HEIGHT: 69 IN | BODY MASS INDEX: 20.14 KG/M2 | SYSTOLIC BLOOD PRESSURE: 114 MMHG | WEIGHT: 136 LBS | DIASTOLIC BLOOD PRESSURE: 78 MMHG

## 2024-05-07 DIAGNOSIS — R19.4 CHANGE IN BOWEL HABITS: ICD-10-CM

## 2024-05-07 PROCEDURE — 25010000002 PROPOFOL 10 MG/ML EMULSION: Performed by: NURSE ANESTHETIST, CERTIFIED REGISTERED

## 2024-05-07 PROCEDURE — 25810000003 SODIUM CHLORIDE 0.9 % SOLUTION: Performed by: ANESTHESIOLOGY

## 2024-05-07 PROCEDURE — 45378 DIAGNOSTIC COLONOSCOPY: CPT | Performed by: INTERNAL MEDICINE

## 2024-05-07 RX ORDER — SODIUM CHLORIDE 9 MG/ML
500 INJECTION, SOLUTION INTRAVENOUS CONTINUOUS PRN
Status: DISCONTINUED | OUTPATIENT
Start: 2024-05-07 | End: 2024-05-07 | Stop reason: HOSPADM

## 2024-05-07 RX ORDER — SODIUM CHLORIDE 9 MG/ML
100 INJECTION, SOLUTION INTRAVENOUS CONTINUOUS
Status: CANCELLED | OUTPATIENT
Start: 2024-05-07

## 2024-05-07 RX ORDER — SODIUM CHLORIDE 9 MG/ML
40 INJECTION, SOLUTION INTRAVENOUS AS NEEDED
Status: CANCELLED | OUTPATIENT
Start: 2024-05-07

## 2024-05-07 RX ORDER — SODIUM CHLORIDE 0.9 % (FLUSH) 0.9 %
10 SYRINGE (ML) INJECTION AS NEEDED
Status: CANCELLED | OUTPATIENT
Start: 2024-05-07

## 2024-05-07 RX ORDER — LIDOCAINE HYDROCHLORIDE 20 MG/ML
INJECTION, SOLUTION EPIDURAL; INFILTRATION; INTRACAUDAL; PERINEURAL AS NEEDED
Status: DISCONTINUED | OUTPATIENT
Start: 2024-05-07 | End: 2024-05-07 | Stop reason: SURG

## 2024-05-07 RX ORDER — SODIUM CHLORIDE 0.9 % (FLUSH) 0.9 %
10 SYRINGE (ML) INJECTION EVERY 12 HOURS SCHEDULED
Status: CANCELLED | OUTPATIENT
Start: 2024-05-07

## 2024-05-07 RX ORDER — PROPOFOL 10 MG/ML
VIAL (ML) INTRAVENOUS AS NEEDED
Status: DISCONTINUED | OUTPATIENT
Start: 2024-05-07 | End: 2024-05-07 | Stop reason: SURG

## 2024-05-07 RX ADMIN — PROPOFOL 180 MG: 10 INJECTION, EMULSION INTRAVENOUS at 10:09

## 2024-05-07 RX ADMIN — LIDOCAINE HYDROCHLORIDE 60 MG: 20 INJECTION, SOLUTION EPIDURAL; INFILTRATION; INTRACAUDAL; PERINEURAL at 10:09

## 2024-05-07 RX ADMIN — SODIUM CHLORIDE 500 ML: 9 INJECTION, SOLUTION INTRAVENOUS at 09:22

## 2024-05-09 ENCOUNTER — TELEPHONE (OUTPATIENT)
Dept: GASTROENTEROLOGY | Facility: CLINIC | Age: 59
End: 2024-05-09
Payer: COMMERCIAL

## 2024-08-08 ENCOUNTER — TRANSCRIBE ORDERS (OUTPATIENT)
Dept: ADMINISTRATIVE | Facility: HOSPITAL | Age: 59
End: 2024-08-08
Payer: COMMERCIAL

## 2024-08-08 DIAGNOSIS — R10.9 ABDOMINAL PAIN, UNSPECIFIED ABDOMINAL LOCATION: Primary | ICD-10-CM

## 2024-08-21 ENCOUNTER — HOSPITAL ENCOUNTER (OUTPATIENT)
Dept: ULTRASOUND IMAGING | Facility: HOSPITAL | Age: 59
Discharge: HOME | End: 2024-08-21
Payer: COMMERCIAL

## 2024-08-26 ENCOUNTER — HOSPITAL ENCOUNTER (OUTPATIENT)
Dept: ULTRASOUND IMAGING | Facility: HOSPITAL | Age: 59
Discharge: HOME | End: 2024-08-26
Admitting: INTERNAL MEDICINE
Payer: COMMERCIAL

## 2024-08-26 DIAGNOSIS — R10.9 ABDOMINAL PAIN, UNSPECIFIED ABDOMINAL LOCATION: ICD-10-CM

## 2024-08-26 PROCEDURE — 76700 US EXAM ABDOM COMPLETE: CPT

## 2024-09-09 NOTE — PROGRESS NOTES
[facility] History and Physical  Sol Fontenot  MRN: [id number]  Age: 59 y.o. female   YOB: 1965        Primary   Problem      Cholelithiasis    SUBJECTIVE  History  of Presenting Illness   Patient is a 59 y.o. female with pertinent past medical history listed below, with longstanding left side abdominal numbness and aching pain that started at the time of her spine surgery, recently underwent an abdominal ultrasound which revealed cholelithiasis without evidence of cholecystitis, presenting for evaluation of cholelithiasis.  She reports that she has no right upper quadrant pain, has no association of her dull pain with food, and has no issues with diarrhea, or intolerance to fatty foods.  She has not lost significant weight, has no food aversion, and aside from this left side, occasionally right lower side pain, has no other abdominal issues to report.  She is an active smoker.      She denies lightheadedness, dizziness, fainting, passing out, headache, nausea, vomiting, chest pain, palpitations, shortness of breath, coughing, wheezing, heart burn, reflux, skin lesions, unintended weight loss/gain, sensitivity to heat/cold, changes in sensation, changes in vision/hearing/smell/taste, myalgias, arthralgias, and has no other acute complaints or concerning symptoms to report at this time.    Past Medical History     Past Medical History:  Past Medical History:   Diagnosis Date    Arthritis     Asthma     COPD (chronic obstructive pulmonary disease)     High cholesterol     High cholesterol     Hypertension     Kidney stone     Psoriasis        PCP: Chip Hillman DO    Past Surgical History:  Past Surgical History:   Procedure Laterality Date    ANGIOGRAM - CONVERTED      x2    COLONOSCOPY N/A 04/20/2021    Procedure: COLONOSCOPY WITH ANESTHESIA;  Surgeon: Ti Lea DO;  Location: [facility] ENDOSCOPY;  Service: Gastroenterology;  Laterality: N/A;  pre op: family  "hx polyps  post op:poor prep  PCP: Keenan Hardwick DO    COLONOSCOPY N/A 04/21/2021    Procedure: COLONOSCOPY WITH ANESTHESIA;  Surgeon: Ti Lea DO;  Location:  [facility] ENDOSCOPY;  Service: Gastroenterology;  Laterality: N/A;  Pre: Family hx Colon Polyps  Post: Colon Polyp  Dr. Keenan Hardwick  CO2 Inflation Used    COLONOSCOPY N/A 05/07/2024    Procedure: COLONOSCOPY WITH ANESTHESIA;  Surgeon: Ti Lea DO;  Location:  [facility] ENDOSCOPY;  Service: Gastroenterology;  Laterality: N/A;  pre: change in bowel habits  post: diverticulosis   keenan hardwick    HYSTERECTOMY  1996    Complete    OOPHORECTOMY      SPINE SURGERY         Family History:  Family History   Problem Relation Age of Onset    Heart disease Mother     Osteoporosis Mother     Alcohol abuse Father     Cancer Father     Osteoporosis Father     Osteoporosis Sister     Breast cancer Sister     Colon polyps Sister     Cancer Brother     Heart disease Brother     Osteoporosis Brother     Colon polyps Brother     Esophageal cancer Neg Hx        Social History:  Social History     Tobacco Use    Smoking status: Every Day     Current packs/day: 0.50     Average packs/day: 0.7 packs/day for 60.7 years (45.3 ttl pk-yrs)     Types: Cigarettes     Start date: 1994    Smokeless tobacco: Never   Substance Use Topics    Alcohol use: No       Allergies:  Allergies   Allergen Reactions    Gabapentin Hallucinations    Codeine Hives    Keflex [Cephalexin] Hives    Ciprofloxacin Rash       Medications:  (Not in a hospital admission)        Review of Systems   Per HPI.      Physical Examination     Vitals:    09/10/24 1005   BP: 150/87   BP Location: Left arm   Patient Position: Sitting   Cuff Size: Adult   Pulse: 77   SpO2: 98%   Weight: 64 kg (141 lb 3.2 oz)   Height: 175.3 cm (69\")       Estimated body mass index is 20.85 kg/m² as calculated from the following:    Height as of this encounter: 175.3 cm (69\").    Weight as of this encounter: 64 kg (141 lb 3.2 " oz).  PREVIOUS WEIGHTS:   Wt Readings from Last 5 Encounters:   09/10/24 64 kg (141 lb 3.2 oz)   05/07/24 61.7 kg (136 lb)   04/30/24 63.5 kg (140 lb)   08/24/23 61.2 kg (135 lb)   08/20/23 58.9 kg (129 lb 12.8 oz)       Physical Examination:  Gen: awake, alert, sitting up in chair in no acute distress  HEENT: NCAT, EOMI, anicteric sclerae  CV: RRR, normotensive  Resp: nonlabored on room air, sats appropriate  Abd: soft, nontender, nondistended without palpable mass  MSK: moves all four, no c/c/e  Neuro: no focal deficits, cranial nerves grossly intact  Psy:  appropriate, cooperative      Data Review     Labs:  CBC  Lab Results   Component Value Date    WBC 10.1 09/26/2023    HGB 14.3 09/26/2023    HCT 42.7 09/26/2023     09/26/2023      BMP  Lab Results   Component Value Date     (L) 08/24/2023    K 3.5 08/24/2023    CL 93 (L) 08/24/2023    CO2 24.0 08/24/2023    BUN 22 (H) 08/24/2023    CREATININE 1.00 01/25/2024    GLUCOSE 110 (H) 08/24/2023    CALCIUM 9.7 08/24/2023    MG 1.9 08/20/2023    PHOS 3.0 08/17/2023      LFTs  Lab Results   Component Value Date    PROTEINTOT 6.9 08/24/2023    ALBUMIN 3.7 08/24/2023    BILITOT 0.8 08/24/2023    ALT 47 (H) 08/24/2023    AST 23 08/24/2023    ALKPHOS 75 08/24/2023      Coag  Lab Results   Component Value Date    PROTIME 13.4 08/24/2023    PTT 26.4 08/24/2023    INR 1.01 08/24/2023      Cardiac markers  Lab Results   Component Value Date    TROPONINT 12 (H) 08/09/2023      Urine:  UA  Lab Results   Component Value Date    COLORU Yellow 08/16/2023    CLARITYU Cloudy (A) 08/16/2023    SPECGRAVUR 1.016 08/16/2023    PHUR 7.5 08/16/2023    PROTEINUA 30 mg/dL (1+) (A) 08/16/2023    GLUCOSEU 250 mg/dL (1+) (A) 08/16/2023    KETONESU Negative 08/16/2023    BILIRUBINUR Negative 08/16/2023    BLOODU Moderate (2+) (A) 08/16/2023    NITRITEU Negative 08/16/2023    LEUKOCYTESUR Negative 08/16/2023    UROBILINOGEN 1.0 E.U./dL 08/16/2023    WBCUA 0-2 (A) 08/16/2023    RBCUA  31-50 (A) 08/16/2023    BACTERIA None Seen 08/16/2023      UDS  Lab Results   Component Value Date    LABOPIASCN Positive (A) 08/09/2023    AMPHETSCREEN Negative 08/09/2023    BARBITSCNUR Negative 08/09/2023    LABBENZSCN Negative 08/09/2023    COCAINEUR Negative 08/09/2023    LABMETHSCN Negative 08/09/2023    PCPUR Negative 08/09/2023    THCURSCR Positive (A) 08/09/2023    METAMPSCNUR Negative 08/09/2023          Problem List     Patient Active Problem List   Diagnosis    Cigarette smoker    Simple chronic bronchitis    Family history of colonic polyps    Hypertensive urgency    Back pain    Tobacco use    Abnormal CT of the abdomen - ? intramural hematoma from aorta to R iliac    Constipation    Intramural hematoma of abdominal aorta    Change in bowel habits    Calculus of gallbladder without cholecystitis without obstruction            Assessment/Plan   Sol Fontenot is a 59 y.o. female with asymptomatic cholelithiasis.  I counseled the patient extensively regarding the nature of the gallbladder, normal physiology, and indications for cholecystectomy.  I discussed with her that cholelithiasis was not, in and of itself, and indication for cholecystectomy, and given her general lack of gallbladder related symptoms, I did not think she was a candidate for surgery at this time.  I did discuss with her if she felt that her gallbladder was starting to give her any of the symptoms that we discussed, we could perform further testing, but I do not think that testing is warranted at this time.  She does report that she will follow-up with her spine surgeons in [place] in the very near future, and I agree with this.  No need for her to follow-up in my office unless she develops further symptoms.    Smoking cessation counseling: Patient is an active smoker, is not interested in cessation at this time, and cessation referral was offered.  BMI counseling: BMI is within normal parameters. No other follow-up for BMI  required.  Med rec complete: Yes  VTE: Ambulatory and outpatient, VTE prophylaxis not required at this time    Time Spent: I spent 30 minutes caring for Sol Fontenot on this date of service. This time includes time, independent of any procedures, spent by me in the following activities: preparing for the clinic visit, reviewing tests, obtaining and/or reviewing a separately obtained history, performing a medically appropriate examination and/or evaluation, counseling and educating the patient/family/caregiver, ordering medications, tests, or procedures, and documenting information in the medical record.     Pieter Evans MD   9/10/2024 12:18 CDT

## 2024-09-10 ENCOUNTER — OFFICE VISIT (OUTPATIENT)
Dept: SURGERY | Facility: CLINIC | Age: 59
End: 2024-09-10
Payer: COMMERCIAL

## 2024-09-10 VITALS
HEIGHT: 69 IN | BODY MASS INDEX: 20.91 KG/M2 | DIASTOLIC BLOOD PRESSURE: 87 MMHG | OXYGEN SATURATION: 98 % | HEART RATE: 77 BPM | SYSTOLIC BLOOD PRESSURE: 150 MMHG | WEIGHT: 141.2 LBS

## 2024-09-10 DIAGNOSIS — K80.20 CALCULUS OF GALLBLADDER WITHOUT CHOLECYSTITIS WITHOUT OBSTRUCTION: Primary | ICD-10-CM

## 2024-09-10 PROCEDURE — 1160F RVW MEDS BY RX/DR IN RCRD: CPT | Performed by: STUDENT IN AN ORGANIZED HEALTH CARE EDUCATION/TRAINING PROGRAM

## 2024-09-10 PROCEDURE — 3077F SYST BP >= 140 MM HG: CPT | Performed by: STUDENT IN AN ORGANIZED HEALTH CARE EDUCATION/TRAINING PROGRAM

## 2024-09-10 PROCEDURE — 1159F MED LIST DOCD IN RCRD: CPT | Performed by: STUDENT IN AN ORGANIZED HEALTH CARE EDUCATION/TRAINING PROGRAM

## 2024-09-10 PROCEDURE — 3079F DIAST BP 80-89 MM HG: CPT | Performed by: STUDENT IN AN ORGANIZED HEALTH CARE EDUCATION/TRAINING PROGRAM

## 2024-09-10 PROCEDURE — 99203 OFFICE O/P NEW LOW 30 MIN: CPT | Performed by: STUDENT IN AN ORGANIZED HEALTH CARE EDUCATION/TRAINING PROGRAM

## 2024-09-10 NOTE — LETTER
September 10, 2024       No Recipients    Patient: Sol Fontenot   YOB: 1965   Date of Visit: 9/10/2024     Dear Chip Hillman, DO:       I have seen Sol Fontenot in my office today in consultation for her recent ultrasound revealing cholelithiasis.  Per my interview and examination, Sol has no symptoms to indicate that she has active gallbladder disease, colic, or dyskinesia.  I discussed gallbladder disease, as well as the indications for cholecystectomy, in great detail with her, and I told her that I would not remove her gallbladder for cholelithiasis alone.  She knows to follow-up with me, should she develop right upper quadrant symptoms, or evidence of biliary colic/dyskinesia.    Should you have any questions or concerns about her care, please feel free to reach out.        Sincerely,        Pieter Evans MD        CC:   No Recipients    Pieter Evans MD  09/10/24 1218  Sign when Signing Visit    [facility] History and Physical  Sol Fontenot  MRN: [id number]  Age: 59 y.o. female   YOB: 1965        Primary   Problem      Cholelithiasis    SUBJECTIVE  History  of Presenting Illness   Patient is a 59 y.o. female with pertinent past medical history listed below, with longstanding left side abdominal numbness and aching pain that started at the time of her spine surgery, recently underwent an abdominal ultrasound which revealed cholelithiasis without evidence of cholecystitis, presenting for evaluation of cholelithiasis.  She reports that she has no right upper quadrant pain, has no association of her dull pain with food, and has no issues with diarrhea, or intolerance to fatty foods.  She has not lost significant weight, has no food aversion, and aside from this left side, occasionally right lower side pain, has no other abdominal issues to report.  She is an active smoker.      She denies lightheadedness, dizziness, fainting, passing  out, headache, nausea, vomiting, chest pain, palpitations, shortness of breath, coughing, wheezing, heart burn, reflux, skin lesions, unintended weight loss/gain, sensitivity to heat/cold, changes in sensation, changes in vision/hearing/smell/taste, myalgias, arthralgias, and has no other acute complaints or concerning symptoms to report at this time.    Past Medical History     Past Medical History:  Past Medical History:   Diagnosis Date   • Arthritis    • Asthma    • COPD (chronic obstructive pulmonary disease)    • High cholesterol    • High cholesterol    • Hypertension    • Kidney stone    • Psoriasis        PCP: Keenan Hardwick DO    Past Surgical History:  Past Surgical History:   Procedure Laterality Date   • ANGIOGRAM - CONVERTED      x2   • COLONOSCOPY N/A 04/20/2021    Procedure: COLONOSCOPY WITH ANESTHESIA;  Surgeon: Ti Lea DO;  Location: [facility] ENDOSCOPY;  Service: Gastroenterology;  Laterality: N/A;  pre op: family hx polyps  post op:poor prep  PCP: Keenan Hardwick DO   • COLONOSCOPY N/A 04/21/2021    Procedure: COLONOSCOPY WITH ANESTHESIA;  Surgeon: Ti Lea DO;  Location: [facility] ENDOSCOPY;  Service: Gastroenterology;  Laterality: N/A;  Pre: Family hx Colon Polyps  Post: Colon Polyp  Dr. Keenan Hardwick  CO2 Inflation Used   • COLONOSCOPY N/A 05/07/2024    Procedure: COLONOSCOPY WITH ANESTHESIA;  Surgeon: Ti Lea DO;  Location: [facility] ENDOSCOPY;  Service: Gastroenterology;  Laterality: N/A;  pre: change in bowel habits  post: diverticulosis   keenan hardwick   • HYSTERECTOMY  1996    Complete   • OOPHORECTOMY     • SPINE SURGERY         Family History:  Family History   Problem Relation Age of Onset   • Heart disease Mother    • Osteoporosis Mother    • Alcohol abuse Father    • Cancer Father    • Osteoporosis Father    • Osteoporosis Sister    • Breast cancer Sister    • Colon polyps Sister    • Cancer Brother    • Heart disease Brother    • Osteoporosis Brother   "  • Colon polyps Brother    • Esophageal cancer Neg Hx        Social History:  Social History     Tobacco Use   • Smoking status: Every Day     Current packs/day: 0.50     Average packs/day: 0.7 packs/day for 60.7 years (45.3 ttl pk-yrs)     Types: Cigarettes     Start date: 1994   • Smokeless tobacco: Never   Substance Use Topics   • Alcohol use: No       Allergies:  Allergies   Allergen Reactions   • Gabapentin Hallucinations   • Codeine Hives   • Keflex [Cephalexin] Hives   • Ciprofloxacin Rash       Medications:  (Not in a hospital admission)        Review of Systems   Per HPI.      Physical Examination     Vitals:    09/10/24 1005   BP: 150/87   BP Location: Left arm   Patient Position: Sitting   Cuff Size: Adult   Pulse: 77   SpO2: 98%   Weight: 64 kg (141 lb 3.2 oz)   Height: 175.3 cm (69\")       Estimated body mass index is 20.85 kg/m² as calculated from the following:    Height as of this encounter: 175.3 cm (69\").    Weight as of this encounter: 64 kg (141 lb 3.2 oz).  PREVIOUS WEIGHTS:   Wt Readings from Last 5 Encounters:   09/10/24 64 kg (141 lb 3.2 oz)   05/07/24 61.7 kg (136 lb)   04/30/24 63.5 kg (140 lb)   08/24/23 61.2 kg (135 lb)   08/20/23 58.9 kg (129 lb 12.8 oz)       Physical Examination:  Gen: awake, alert, sitting up in chair in no acute distress  HEENT: NCAT, EOMI, anicteric sclerae  CV: RRR, normotensive  Resp: nonlabored on room air, sats appropriate  Abd: soft, nontender, nondistended without palpable mass  MSK: moves all four, no c/c/e  Neuro: no focal deficits, cranial nerves grossly intact  Psy:  appropriate, cooperative      Data Review     Labs:  CBC  Lab Results   Component Value Date    WBC 10.1 09/26/2023    HGB 14.3 09/26/2023    HCT 42.7 09/26/2023     09/26/2023      BMP  Lab Results   Component Value Date     (L) 08/24/2023    K 3.5 08/24/2023    CL 93 (L) 08/24/2023    CO2 24.0 08/24/2023    BUN 22 (H) 08/24/2023    CREATININE 1.00 01/25/2024    GLUCOSE 110 (H) " 08/24/2023    CALCIUM 9.7 08/24/2023    MG 1.9 08/20/2023    PHOS 3.0 08/17/2023      LFTs  Lab Results   Component Value Date    PROTEINTOT 6.9 08/24/2023    ALBUMIN 3.7 08/24/2023    BILITOT 0.8 08/24/2023    ALT 47 (H) 08/24/2023    AST 23 08/24/2023    ALKPHOS 75 08/24/2023      Coag  Lab Results   Component Value Date    PROTIME 13.4 08/24/2023    PTT 26.4 08/24/2023    INR 1.01 08/24/2023      Cardiac markers  Lab Results   Component Value Date    TROPONINT 12 (H) 08/09/2023      Urine:  UA  Lab Results   Component Value Date    COLORU Yellow 08/16/2023    CLARITYU Cloudy (A) 08/16/2023    SPECGRAVUR 1.016 08/16/2023    PHUR 7.5 08/16/2023    PROTEINUA 30 mg/dL (1+) (A) 08/16/2023    GLUCOSEU 250 mg/dL (1+) (A) 08/16/2023    KETONESU Negative 08/16/2023    BILIRUBINUR Negative 08/16/2023    BLOODU Moderate (2+) (A) 08/16/2023    NITRITEU Negative 08/16/2023    LEUKOCYTESUR Negative 08/16/2023    UROBILINOGEN 1.0 E.U./dL 08/16/2023    WBCUA 0-2 (A) 08/16/2023    RBCUA 31-50 (A) 08/16/2023    BACTERIA None Seen 08/16/2023      UDS  Lab Results   Component Value Date    LABOPIASCN Positive (A) 08/09/2023    AMPHETSCREEN Negative 08/09/2023    BARBITSCNUR Negative 08/09/2023    LABBENZSCN Negative 08/09/2023    COCAINEUR Negative 08/09/2023    LABMETHSCN Negative 08/09/2023    PCPUR Negative 08/09/2023    THCURSCR Positive (A) 08/09/2023    METAMPSCNUR Negative 08/09/2023          Problem List     Patient Active Problem List   Diagnosis   • Cigarette smoker   • Simple chronic bronchitis   • Family history of colonic polyps   • Hypertensive urgency   • Back pain   • Tobacco use   • Abnormal CT of the abdomen - ? intramural hematoma from aorta to R iliac   • Constipation   • Intramural hematoma of abdominal aorta   • Change in bowel habits   • Calculus of gallbladder without cholecystitis without obstruction            Assessment/Plan   Sol Fontenot is a 59 y.o. female with asymptomatic cholelithiasis.  I  counseled the patient extensively regarding the nature of the gallbladder, normal physiology, and indications for cholecystectomy.  I discussed with her that cholelithiasis was not, in and of itself, and indication for cholecystectomy, and given her general lack of gallbladder related symptoms, I did not think she was a candidate for surgery at this time.  I did discuss with her if she felt that her gallbladder was starting to give her any of the symptoms that we discussed, we could perform further testing, but I do not think that testing is warranted at this time.  She does report that she will follow-up with her spine surgeons in [place] in the very near future, and I agree with this.  No need for her to follow-up in my office unless she develops further symptoms.    Smoking cessation counseling: Patient is an active smoker, is not interested in cessation at this time, and cessation referral was offered.  BMI counseling: BMI is within normal parameters. No other follow-up for BMI required.  Med rec complete: Yes  VTE: Ambulatory and outpatient, VTE prophylaxis not required at this time    Time Spent: I spent 30 minutes caring for Sol Fontenot on this date of service. This time includes time, independent of any procedures, spent by me in the following activities: preparing for the clinic visit, reviewing tests, obtaining and/or reviewing a separately obtained history, performing a medically appropriate examination and/or evaluation, counseling and educating the patient/family/caregiver, ordering medications, tests, or procedures, and documenting information in the medical record.     Pieter Evans MD   9/10/2024 12:18 CDT

## 2025-01-06 ENCOUNTER — TRANSCRIBE ORDERS (OUTPATIENT)
Dept: ADMINISTRATIVE | Facility: HOSPITAL | Age: 60
End: 2025-01-06
Payer: COMMERCIAL

## 2025-01-06 DIAGNOSIS — I62.1 NONTRAUMATIC EXTRADURAL HEMORRHAGE: Primary | ICD-10-CM

## 2025-01-28 ENCOUNTER — HOSPITAL ENCOUNTER (OUTPATIENT)
Dept: MRI IMAGING | Facility: HOSPITAL | Age: 60
Discharge: HOME | End: 2025-01-28
Payer: COMMERCIAL

## 2025-01-28 DIAGNOSIS — I62.1 NONTRAUMATIC EXTRADURAL HEMORRHAGE: ICD-10-CM

## 2025-01-28 PROCEDURE — 25510000001 GADOPICLENOL 0.5 MMOL/ML SOLUTION: Performed by: PHYSICIAN ASSISTANT

## 2025-01-28 PROCEDURE — 72158 MRI LUMBAR SPINE W/O & W/DYE: CPT

## 2025-01-28 PROCEDURE — A9579 GAD-BASE MR CONTRAST NOS,1ML: HCPCS | Performed by: PHYSICIAN ASSISTANT

## 2025-01-28 PROCEDURE — 72157 MRI CHEST SPINE W/O & W/DYE: CPT

## 2025-01-28 RX ADMIN — GADOPICLENOL 6.5 ML: 485.1 INJECTION INTRAVENOUS at 10:30

## 2025-03-25 ENCOUNTER — TRANSCRIBE ORDERS (OUTPATIENT)
Dept: ADMINISTRATIVE | Facility: HOSPITAL | Age: 60
End: 2025-03-25
Payer: COMMERCIAL

## 2025-03-25 DIAGNOSIS — Z78.0 ASYMPTOMATIC MENOPAUSAL STATE: ICD-10-CM

## 2025-03-25 DIAGNOSIS — Z12.31 ENCOUNTER FOR SCREENING MAMMOGRAM FOR MALIGNANT NEOPLASM OF BREAST: Primary | ICD-10-CM

## 2025-04-01 LAB
NCCN CRITERIA FLAG: NORMAL
TYRER CUZICK SCORE: 2.9

## 2025-04-03 ENCOUNTER — HOSPITAL ENCOUNTER (OUTPATIENT)
Dept: MAMMOGRAPHY | Facility: HOSPITAL | Age: 60
Discharge: HOME | End: 2025-04-03
Payer: COMMERCIAL

## 2025-04-03 ENCOUNTER — HOSPITAL ENCOUNTER (OUTPATIENT)
Dept: BONE DENSITY | Facility: HOSPITAL | Age: 60
Discharge: HOME | End: 2025-04-03
Payer: COMMERCIAL

## 2025-04-03 DIAGNOSIS — Z12.31 ENCOUNTER FOR SCREENING MAMMOGRAM FOR MALIGNANT NEOPLASM OF BREAST: ICD-10-CM

## 2025-04-03 DIAGNOSIS — Z78.0 ASYMPTOMATIC MENOPAUSAL STATE: ICD-10-CM

## 2025-04-03 PROCEDURE — 77067 SCR MAMMO BI INCL CAD: CPT

## 2025-04-03 PROCEDURE — 77063 BREAST TOMOSYNTHESIS BI: CPT

## 2025-04-03 PROCEDURE — 77080 DXA BONE DENSITY AXIAL: CPT

## 2025-04-09 ENCOUNTER — HOSPITAL ENCOUNTER (OUTPATIENT)
Dept: ULTRASOUND IMAGING | Facility: HOSPITAL | Age: 60
Discharge: HOME | End: 2025-04-09
Payer: COMMERCIAL

## 2025-04-09 ENCOUNTER — HOSPITAL ENCOUNTER (OUTPATIENT)
Dept: MAMMOGRAPHY | Facility: HOSPITAL | Age: 60
Discharge: HOME | End: 2025-04-09
Payer: COMMERCIAL

## 2025-04-09 DIAGNOSIS — R92.8 ABNORMAL MAMMOGRAM: ICD-10-CM

## 2025-04-09 PROCEDURE — G0279 TOMOSYNTHESIS, MAMMO: HCPCS

## 2025-04-09 PROCEDURE — 77065 DX MAMMO INCL CAD UNI: CPT

## 2025-04-09 PROCEDURE — 76642 ULTRASOUND BREAST LIMITED: CPT

## (undated) DEVICE — THE CHANNEL CLEANING BRUSH IS A NYLON FLEXI BRUSH ATTACHED TO A FLEXIBLE PLASTIC SHEATH DESIGNED TO SAFELY REMOVE DEBRIS FROM FLEXIBLE ENDOSCOPES.

## (undated) DEVICE — Device: Brand: DEFENDO AIR/WATER/SUCTION AND BIOPSY VALVE

## (undated) DEVICE — SENSR O2 OXIMAX FNGR A/ 18IN NONSTR

## (undated) DEVICE — TBG SMPL FLTR LINE NASL 02/C02 A/ BX/100

## (undated) DEVICE — YANKAUER,BULB TIP WITH VENT: Brand: ARGYLE

## (undated) DEVICE — MASK,OXYGEN,MED CONC,ADLT,7' TUB, UC: Brand: PENDING

## (undated) DEVICE — SNAR POLYP CAPTIVATOR MICROHEX 13 240CM